# Patient Record
Sex: FEMALE | Race: BLACK OR AFRICAN AMERICAN | NOT HISPANIC OR LATINO | Employment: OTHER | URBAN - METROPOLITAN AREA
[De-identification: names, ages, dates, MRNs, and addresses within clinical notes are randomized per-mention and may not be internally consistent; named-entity substitution may affect disease eponyms.]

---

## 2023-04-05 ENCOUNTER — PATIENT OUTREACH (OUTPATIENT)
Dept: CARE COORDINATION | Facility: CLINIC | Age: 63
End: 2023-04-05
Payer: COMMERCIAL

## 2023-04-05 DIAGNOSIS — K46.0 INCARCERATED HERNIA: ICD-10-CM

## 2023-04-05 RX ORDER — OXYCODONE HYDROCHLORIDE AND ACETAMINOPHEN 325; 5 MG/5ML; MG/5ML
5 SOLUTION ORAL EVERY 8 HOURS PRN
COMMUNITY
End: 2023-04-06 | Stop reason: SDUPTHER

## 2023-04-05 NOTE — PROGRESS NOTES
Discharge Facility:[facility]  Discharge Diagnosis:incarcerated hernia  Admission Date:03/31/23  Discharge Date:04/04/23    PCP appt:04/06/23  Engagement  Call Start Time: 0845 (4/5/2023  9:03 AM)    Medications  Medications reviewed with patient/caregiver?: Yes (4/5/2023  9:03 AM)  Is the patient having any side effects they believe may be caused by any medication additions or changes?: No (4/5/2023  9:03 AM)  Does the patient have all medications ordered at discharge?: Yes (4/5/2023  9:03 AM)  Is the patient taking all medications as directed (includes completed medication regime)?: Yes (4/5/2023  9:03 AM)  Care Management Interventions: Provided patient education (4/5/2023  9:03 AM)    Appointments  Does the patient have a primary care provider?: Yes (4/5/2023  9:03 AM)  Care Management Interventions: Advised patient to make appointment (4/5/2023  8:46 AM)  Care Management Interventions: Advised patient to keep appointment (4/5/2023  9:03 AM)    Self Management  Has home health visited the patient within 72 hours of discharge?: No (4/5/2023  9:03 AM)  Has all Durable Medical Equipment (DME) been delivered?: No (4/5/2023  9:03 AM)    Patient Teaching  Does the patient have access to their discharge instructions?: Yes (4/5/2023  9:03 AM)  Care Management Interventions: Reviewed instructions with patient (4/5/2023  9:03 AM)  What is the patient's perception of their health status since discharge?: Improving (4/5/2023  9:03 AM)  Is the patient/caregiver able to teach back the hierarchy of who to call/visit for symptoms/problems? PCP, Specialist, Home Health nurse, Urgent Care, ED, 911: Yes (4/5/2023  9:03 AM)    Wrap Up  Call End Time: 0850 (4/5/2023  9:03 AM)

## 2023-04-06 ENCOUNTER — OFFICE VISIT (OUTPATIENT)
Dept: PRIMARY CARE | Facility: CLINIC | Age: 63
End: 2023-04-06
Payer: COMMERCIAL

## 2023-04-06 ENCOUNTER — TELEPHONE (OUTPATIENT)
Dept: PRIMARY CARE | Facility: CLINIC | Age: 63
End: 2023-04-06

## 2023-04-06 VITALS
RESPIRATION RATE: 17 BRPM | WEIGHT: 170.6 LBS | TEMPERATURE: 97.1 F | HEIGHT: 61 IN | DIASTOLIC BLOOD PRESSURE: 73 MMHG | SYSTOLIC BLOOD PRESSURE: 126 MMHG | OXYGEN SATURATION: 97 % | BODY MASS INDEX: 32.21 KG/M2 | HEART RATE: 75 BPM

## 2023-04-06 DIAGNOSIS — R30.0 DYSURIA: ICD-10-CM

## 2023-04-06 DIAGNOSIS — K42.0 INCARCERATED UMBILICAL HERNIA: ICD-10-CM

## 2023-04-06 DIAGNOSIS — Z09 HOSPITAL DISCHARGE FOLLOW-UP: Primary | ICD-10-CM

## 2023-04-06 PROBLEM — M71.22 BAKER'S CYST, LEFT: Status: RESOLVED | Noted: 2023-04-06 | Resolved: 2023-04-06

## 2023-04-06 PROBLEM — N18.30 STAGE 3 CHRONIC KIDNEY DISEASE (MULTI): Status: ACTIVE | Noted: 2022-11-21

## 2023-04-06 PROBLEM — H52.10 MYOPIA WITH ASTIGMATISM AND PRESBYOPIA: Status: RESOLVED | Noted: 2023-04-06 | Resolved: 2023-04-06

## 2023-04-06 PROBLEM — L52 ERYTHEMA NODOSUM: Status: ACTIVE | Noted: 2023-04-06

## 2023-04-06 PROBLEM — D68.61 ANTIPHOSPHOLIPID ANTIBODY SYNDROME (MULTI): Status: ACTIVE | Noted: 2023-04-06

## 2023-04-06 PROBLEM — G25.81 RESTLESS LEGS SYNDROME: Status: ACTIVE | Noted: 2023-04-06

## 2023-04-06 PROBLEM — L21.9 SEBORRHEIC DERMATITIS OF SCALP: Status: RESOLVED | Noted: 2023-04-06 | Resolved: 2023-04-06

## 2023-04-06 PROBLEM — G56.21 ULNAR NEUROPATHY AT ELBOW OF RIGHT UPPER EXTREMITY: Status: RESOLVED | Noted: 2023-04-06 | Resolved: 2023-04-06

## 2023-04-06 PROBLEM — I87.009 POST-THROMBOTIC SYNDROME: Status: ACTIVE | Noted: 2023-04-06

## 2023-04-06 PROBLEM — G57.01 PIRIFORMIS SYNDROME OF RIGHT SIDE: Status: RESOLVED | Noted: 2023-04-06 | Resolved: 2023-04-06

## 2023-04-06 PROBLEM — G31.84 AMNESTIC MCI (MILD COGNITIVE IMPAIRMENT WITH MEMORY LOSS): Status: ACTIVE | Noted: 2023-04-06

## 2023-04-06 PROBLEM — M25.562 BILATERAL KNEE PAIN: Status: ACTIVE | Noted: 2023-04-06

## 2023-04-06 PROBLEM — M18.12 PRIMARY OSTEOARTHRITIS OF FIRST CARPOMETACARPAL JOINT OF LEFT HAND: Status: RESOLVED | Noted: 2023-04-06 | Resolved: 2023-04-06

## 2023-04-06 PROBLEM — E66.9 OBESITY: Status: ACTIVE | Noted: 2023-04-06

## 2023-04-06 PROBLEM — D31.02 NEVUS OF LEFT CONJUNCTIVA: Status: RESOLVED | Noted: 2023-04-06 | Resolved: 2023-04-06

## 2023-04-06 PROBLEM — M25.561 BILATERAL KNEE PAIN: Status: ACTIVE | Noted: 2023-04-06

## 2023-04-06 PROBLEM — M43.10 SPONDYLOLISTHESIS, ACQUIRED: Status: RESOLVED | Noted: 2023-04-06 | Resolved: 2023-04-06

## 2023-04-06 PROBLEM — N30.80 CYSTITIS CYSTICA: Status: ACTIVE | Noted: 2023-04-06

## 2023-04-06 PROBLEM — M54.16 LUMBAR RADICULOPATHY: Status: ACTIVE | Noted: 2023-04-06

## 2023-04-06 PROBLEM — I87.2 PERIPHERAL VENOUS INSUFFICIENCY: Status: ACTIVE | Noted: 2021-04-07

## 2023-04-06 PROBLEM — J30.2 SEASONAL ALLERGIC RHINITIS: Status: ACTIVE | Noted: 2023-04-06

## 2023-04-06 PROBLEM — G43.009 MIGRAINE WITHOUT AURA AND WITHOUT STATUS MIGRAINOSUS, NOT INTRACTABLE: Status: ACTIVE | Noted: 2023-04-06

## 2023-04-06 PROBLEM — M19.90 INFLAMMATORY ARTHRITIS: Status: ACTIVE | Noted: 2023-04-06

## 2023-04-06 PROBLEM — Z86.718 HISTORY OF DVT (DEEP VEIN THROMBOSIS): Status: ACTIVE | Noted: 2023-04-06

## 2023-04-06 PROBLEM — E11.9 TYPE 2 DIABETES MELLITUS (MULTI): Status: RESOLVED | Noted: 2022-11-21 | Resolved: 2023-04-06

## 2023-04-06 PROBLEM — H18.519 FUCHS' ENDOTHELIAL DYSTROPHY: Status: ACTIVE | Noted: 2023-04-06

## 2023-04-06 PROBLEM — F43.23 ADJUSTMENT DISORDER WITH MIXED ANXIETY AND DEPRESSED MOOD: Status: ACTIVE | Noted: 2023-04-06

## 2023-04-06 PROBLEM — I73.00 RAYNAUD'S PHENOMENON WITHOUT GANGRENE: Status: ACTIVE | Noted: 2023-04-06

## 2023-04-06 PROBLEM — K11.7 XEROSTOMIA: Status: ACTIVE | Noted: 2023-04-06

## 2023-04-06 PROBLEM — I50.9 CHF (CONGESTIVE HEART FAILURE) (MULTI): Status: ACTIVE | Noted: 2023-04-06

## 2023-04-06 PROBLEM — R94.2 DIFFUSION CAPACITY OF LUNG (DL), DECREASED: Status: ACTIVE | Noted: 2023-04-06

## 2023-04-06 PROBLEM — I88.9 CERVICAL LYMPHADENITIS: Status: RESOLVED | Noted: 2023-04-06 | Resolved: 2023-04-06

## 2023-04-06 PROBLEM — H25.13 CATARACT, NUCLEAR SCLEROTIC, BOTH EYES: Status: RESOLVED | Noted: 2023-04-06 | Resolved: 2023-04-06

## 2023-04-06 PROBLEM — L85.3 DRY SKIN DERMATITIS: Status: ACTIVE | Noted: 2023-04-06

## 2023-04-06 PROBLEM — M15.9 OSTEOARTHRITIS, MULTIPLE SITES: Status: ACTIVE | Noted: 2023-04-06

## 2023-04-06 PROBLEM — G56.00 CARPAL TUNNEL SYNDROME: Status: RESOLVED | Noted: 2023-04-06 | Resolved: 2023-04-06

## 2023-04-06 PROBLEM — K86.2 PANCREAS CYST (HHS-HCC): Status: ACTIVE | Noted: 2023-04-06

## 2023-04-06 PROBLEM — M47.817 LUMBOSACRAL SPONDYLOSIS: Status: RESOLVED | Noted: 2023-04-06 | Resolved: 2023-04-06

## 2023-04-06 PROBLEM — G89.29 CHRONIC PAIN OF LEFT ANKLE: Status: RESOLVED | Noted: 2023-04-06 | Resolved: 2023-04-06

## 2023-04-06 PROBLEM — G80.9 CEREBRAL PALSY (MULTI): Status: ACTIVE | Noted: 2022-11-21

## 2023-04-06 PROBLEM — J45.909 ASTHMA (HHS-HCC): Status: ACTIVE | Noted: 2023-04-06

## 2023-04-06 PROBLEM — M54.50 CHRONIC MIDLINE LOW BACK PAIN WITHOUT SCIATICA: Status: ACTIVE | Noted: 2023-04-06

## 2023-04-06 PROBLEM — E78.5 HLD (HYPERLIPIDEMIA): Status: ACTIVE | Noted: 2023-04-06

## 2023-04-06 PROBLEM — N32.81 OVERACTIVE BLADDER: Status: ACTIVE | Noted: 2023-04-06

## 2023-04-06 PROBLEM — N18.30 CHRONIC KIDNEY DISEASE (CKD), STAGE III (MODERATE) (MULTI): Status: ACTIVE | Noted: 2023-04-06

## 2023-04-06 PROBLEM — H52.4 MYOPIA WITH ASTIGMATISM AND PRESBYOPIA: Status: RESOLVED | Noted: 2023-04-06 | Resolved: 2023-04-06

## 2023-04-06 PROBLEM — R73.03 PREDIABETES: Status: ACTIVE | Noted: 2023-04-06

## 2023-04-06 PROBLEM — I10 HYPERTENSION: Status: ACTIVE | Noted: 2023-04-06

## 2023-04-06 PROBLEM — D86.9 SARCOIDOSIS: Status: ACTIVE | Noted: 2023-04-06

## 2023-04-06 PROBLEM — M25.572 CHRONIC PAIN OF LEFT ANKLE: Status: RESOLVED | Noted: 2023-04-06 | Resolved: 2023-04-06

## 2023-04-06 PROBLEM — R31.29 MICROSCOPIC HEMATURIA: Status: ACTIVE | Noted: 2023-04-06

## 2023-04-06 PROBLEM — N95.1 HOT FLASHES, MENOPAUSAL: Status: RESOLVED | Noted: 2023-04-06 | Resolved: 2023-04-06

## 2023-04-06 PROBLEM — H52.209 MYOPIA WITH ASTIGMATISM AND PRESBYOPIA: Status: RESOLVED | Noted: 2023-04-06 | Resolved: 2023-04-06

## 2023-04-06 PROBLEM — E55.9 VITAMIN D DEFICIENCY: Status: ACTIVE | Noted: 2023-04-06

## 2023-04-06 PROBLEM — M77.11 LATERAL EPICONDYLITIS OF RIGHT ELBOW: Status: RESOLVED | Noted: 2023-04-06 | Resolved: 2023-04-06

## 2023-04-06 PROBLEM — G89.29 CHRONIC MIDLINE LOW BACK PAIN WITHOUT SCIATICA: Status: ACTIVE | Noted: 2023-04-06

## 2023-04-06 PROBLEM — G62.9 SMALL FIBER NEUROPATHY: Status: ACTIVE | Noted: 2023-04-06

## 2023-04-06 PROBLEM — D12.6 TUBULAR ADENOMA OF COLON: Status: ACTIVE | Noted: 2023-04-06

## 2023-04-06 PROBLEM — G47.30 SLEEP APNEA: Status: ACTIVE | Noted: 2023-04-06

## 2023-04-06 LAB
POC APPEARANCE, URINE: CLEAR
POC BILIRUBIN, URINE: NEGATIVE
POC BLOOD, URINE: NEGATIVE
POC COLOR, URINE: YELLOW
POC GLUCOSE, URINE: NEGATIVE MG/DL
POC KETONES, URINE: NEGATIVE MG/DL
POC LEUKOCYTES, URINE: ABNORMAL
POC NITRITE,URINE: NEGATIVE
POC PH, URINE: 5 PH
POC PROTEIN, URINE: NEGATIVE MG/DL
POC SPECIFIC GRAVITY, URINE: 1.01
POC UROBILINOGEN, URINE: 0.2 EU/DL

## 2023-04-06 PROCEDURE — 99213 OFFICE O/P EST LOW 20 MIN: CPT | Performed by: FAMILY MEDICINE

## 2023-04-06 PROCEDURE — 81003 URINALYSIS AUTO W/O SCOPE: CPT | Performed by: FAMILY MEDICINE

## 2023-04-06 PROCEDURE — 3074F SYST BP LT 130 MM HG: CPT | Performed by: FAMILY MEDICINE

## 2023-04-06 PROCEDURE — 3078F DIAST BP <80 MM HG: CPT | Performed by: FAMILY MEDICINE

## 2023-04-06 PROCEDURE — 87086 URINE CULTURE/COLONY COUNT: CPT

## 2023-04-06 RX ORDER — WARFARIN SODIUM 5 MG/1
TABLET ORAL
COMMUNITY
Start: 2020-08-21 | End: 2024-06-04 | Stop reason: WASHOUT

## 2023-04-06 RX ORDER — ALBUTEROL SULFATE 90 UG/1
2 AEROSOL, METERED RESPIRATORY (INHALATION) EVERY 6 HOURS PRN
COMMUNITY
End: 2023-08-31 | Stop reason: SDUPTHER

## 2023-04-06 RX ORDER — FAMOTIDINE 20 MG/1
20 TABLET, FILM COATED ORAL 2 TIMES DAILY
COMMUNITY
End: 2024-04-04 | Stop reason: SDUPTHER

## 2023-04-06 RX ORDER — CYCLOBENZAPRINE HCL 5 MG
1 TABLET ORAL NIGHTLY PRN
COMMUNITY
Start: 2013-03-12 | End: 2023-05-11 | Stop reason: SDUPTHER

## 2023-04-06 RX ORDER — SILDENAFIL CITRATE 20 MG/1
1 TABLET ORAL 2 TIMES DAILY
COMMUNITY
Start: 2022-04-19

## 2023-04-06 RX ORDER — PANTOPRAZOLE SODIUM 40 MG/1
40 TABLET, DELAYED RELEASE ORAL DAILY
COMMUNITY

## 2023-04-06 RX ORDER — CARBIDOPA AND LEVODOPA 25; 100 MG/1; MG/1
1.5 TABLET ORAL DAILY
COMMUNITY
Start: 2022-02-17 | End: 2023-10-06

## 2023-04-06 RX ORDER — VALACYCLOVIR HYDROCHLORIDE 500 MG/1
500 TABLET, FILM COATED ORAL 2 TIMES DAILY
COMMUNITY
End: 2023-06-28 | Stop reason: SDUPTHER

## 2023-04-06 RX ORDER — TRAMADOL HYDROCHLORIDE 50 MG/1
1 TABLET ORAL 2 TIMES DAILY
COMMUNITY
Start: 2023-03-21 | End: 2024-05-16 | Stop reason: ALTCHOICE

## 2023-04-06 RX ORDER — AMLODIPINE BESYLATE 10 MG/1
10 TABLET ORAL DAILY
COMMUNITY
End: 2023-06-28 | Stop reason: SDUPTHER

## 2023-04-06 RX ORDER — ALBUTEROL SULFATE 0.83 MG/ML
2.5 SOLUTION RESPIRATORY (INHALATION) EVERY 6 HOURS PRN
COMMUNITY

## 2023-04-06 RX ORDER — COLLOIDAL OATMEAL 1 %
CREAM (GRAM) TOPICAL 4 TIMES DAILY
COMMUNITY
Start: 2022-02-02 | End: 2024-06-04 | Stop reason: WASHOUT

## 2023-04-06 RX ORDER — NALOXONE HYDROCHLORIDE 4 MG/.1ML
4 SPRAY NASAL AS NEEDED
COMMUNITY
End: 2023-06-28 | Stop reason: ALTCHOICE

## 2023-04-06 RX ORDER — OXYCODONE AND ACETAMINOPHEN 5; 325 MG/1; MG/1
TABLET ORAL
COMMUNITY
Start: 2023-04-04 | End: 2023-06-28 | Stop reason: ALTCHOICE

## 2023-04-06 RX ORDER — FUROSEMIDE 20 MG/1
TABLET ORAL
COMMUNITY
End: 2023-05-11 | Stop reason: SDUPTHER

## 2023-04-06 RX ORDER — ERGOCALCIFEROL 1.25 MG/1
50000 CAPSULE ORAL
COMMUNITY
End: 2023-08-06 | Stop reason: DRUGHIGH

## 2023-04-06 RX ORDER — FLUTICASONE PROPIONATE 50 MCG
SPRAY, SUSPENSION (ML) NASAL
COMMUNITY

## 2023-04-06 RX ORDER — ATORVASTATIN CALCIUM 40 MG/1
40 TABLET, FILM COATED ORAL DAILY
COMMUNITY
End: 2023-06-28 | Stop reason: DRUGHIGH

## 2023-04-06 RX ORDER — POLYETHYLENE GLYCOL 3350 17 G/17G
17 POWDER, FOR SOLUTION ORAL DAILY PRN
COMMUNITY
Start: 2022-05-10 | End: 2024-02-01 | Stop reason: ALTCHOICE

## 2023-04-06 RX ORDER — NITROGLYCERIN 0.4 MG/1
0.4 TABLET SUBLINGUAL EVERY 5 MIN PRN
COMMUNITY
End: 2023-12-26 | Stop reason: SDUPTHER

## 2023-04-06 RX ORDER — TOPIRAMATE 25 MG/1
100 TABLET ORAL NIGHTLY
COMMUNITY
End: 2023-12-01

## 2023-04-06 ASSESSMENT — ENCOUNTER SYMPTOMS
CHILLS: 0
DIARRHEA: 0
FATIGUE: 0
NAUSEA: 0
DIAPHORESIS: 0
VOMITING: 0
DEPRESSION: 0
HEMATURIA: 0
LOSS OF SENSATION IN FEET: 1
FEVER: 0
DIZZINESS: 0
APPETITE CHANGE: 0
COUGH: 0
LIGHT-HEADEDNESS: 0
WHEEZING: 0
UNEXPECTED WEIGHT CHANGE: 0
OCCASIONAL FEELINGS OF UNSTEADINESS: 1
FREQUENCY: 1
DYSURIA: 1

## 2023-04-06 ASSESSMENT — PATIENT HEALTH QUESTIONNAIRE - PHQ9
2. FEELING DOWN, DEPRESSED OR HOPELESS: NOT AT ALL
1. LITTLE INTEREST OR PLEASURE IN DOING THINGS: NOT AT ALL
SUM OF ALL RESPONSES TO PHQ9 QUESTIONS 1 AND 2: 0

## 2023-04-06 ASSESSMENT — COLUMBIA-SUICIDE SEVERITY RATING SCALE - C-SSRS
1. IN THE PAST MONTH, HAVE YOU WISHED YOU WERE DEAD OR WISHED YOU COULD GO TO SLEEP AND NOT WAKE UP?: NO
2. HAVE YOU ACTUALLY HAD ANY THOUGHTS OF KILLING YOURSELF?: NO
6. HAVE YOU EVER DONE ANYTHING, STARTED TO DO ANYTHING, OR PREPARED TO DO ANYTHING TO END YOUR LIFE?: NO

## 2023-04-06 NOTE — ASSESSMENT & PLAN NOTE
Had surgery for incarcerated umbilical hernia on 4/1/23. Home with daughter. Has follow up with surgeon next week. Feeling well except burning urethra. UA ordered.

## 2023-04-06 NOTE — TELEPHONE ENCOUNTER
Patient called in and stated that she was seen today and was told to follow up with her Surgeon. The patient was told to follow up with you on an antibiotic after results from her urine culture.

## 2023-04-07 ENCOUNTER — PATIENT OUTREACH (OUTPATIENT)
Dept: CARE COORDINATION | Facility: CLINIC | Age: 63
End: 2023-04-07
Payer: COMMERCIAL

## 2023-04-07 LAB — URINE CULTURE: NORMAL

## 2023-04-07 NOTE — PROGRESS NOTES
Call regarding apt with PCP on 04/07/23after hospitalization  At time of outreach call the patient feels as if their condition has (improved since last visit.  Patient verbalizes understanding of new orders including labs, therapy, HHC, and DME.   Patient verbalizes understanding of medications including changes made during PCP  Patient verbalizes understanding of referrals needed to specialist.  Patient verbalized understanding plan of care   Any further questions or concerns at this time for PCP? No  Does patient appear to have CCM needs and will need referral to nurse after call back? No

## 2023-04-10 ENCOUNTER — TELEPHONE (OUTPATIENT)
Dept: PRIMARY CARE | Facility: CLINIC | Age: 63
End: 2023-04-10
Payer: COMMERCIAL

## 2023-04-11 NOTE — TELEPHONE ENCOUNTER
Called patient and notified her that her urine culture was negative. Patient understood with no questions or concerns.

## 2023-05-09 ENCOUNTER — PATIENT OUTREACH (OUTPATIENT)
Dept: CARE COORDINATION | Facility: CLINIC | Age: 63
End: 2023-05-09
Payer: COMMERCIAL

## 2023-05-09 NOTE — PROGRESS NOTES
Discharge Facility:[facility]  Discharge Diagnosis:Cellulitis facial  Admission Date:05/06/23  Discharge Date: 05/08/23    PCP Appointment Date:05/17/23  Specialist Appointment Date:   Hospital Encounter and Summary: Linked   See discharge assessment below for further details  Engagement  Call Start Time: 1008 (5/9/2023 10:08 AM)    Medications  Medications reviewed with patient/caregiver?: Yes (only new meds) (5/9/2023 10:08 AM)  Is the patient having any side effects they believe may be caused by any medication additions or changes?: No (5/9/2023 10:08 AM)  Does the patient have all medications ordered at discharge?: Yes (5/9/2023 10:08 AM)  Is the patient taking all medications as directed (includes completed medication regime)?: Yes (5/9/2023 10:08 AM)    Appointments  Does the patient have a primary care provider?: Yes (5/9/2023 10:08 AM)    Self Management  Has home health visited the patient within 72 hours of discharge?: Not applicable (5/9/2023 10:08 AM)  Has all Durable Medical Equipment (DME) been delivered?: No (5/9/2023 10:08 AM)    Patient Teaching  Does the patient have access to their discharge instructions?: Yes (5/9/2023 10:08 AM)  Care Management Interventions: Reviewed instructions with patient (5/9/2023 10:08 AM)  What is the patient's perception of their health status since discharge?: Improving (5/9/2023 10:08 AM)  Is the patient/caregiver able to teach back the hierarchy of who to call/visit for symptoms/problems? PCP, Specialist, Home Health nurse, Urgent Care, ED, 911: Yes (5/9/2023 10:08 AM)    Wrap Up  Call End Time: 1012 (5/9/2023 10:08 AM)

## 2023-05-11 ENCOUNTER — TELEPHONE (OUTPATIENT)
Dept: PRIMARY CARE | Facility: CLINIC | Age: 63
End: 2023-05-11
Payer: COMMERCIAL

## 2023-05-11 DIAGNOSIS — I50.9 CONGESTIVE HEART FAILURE, UNSPECIFIED HF CHRONICITY, UNSPECIFIED HEART FAILURE TYPE (MULTI): ICD-10-CM

## 2023-05-11 DIAGNOSIS — G25.81 RESTLESS LEGS SYNDROME: ICD-10-CM

## 2023-05-11 RX ORDER — FUROSEMIDE 20 MG/1
TABLET ORAL
Qty: 30 TABLET | Refills: 0 | Status: SHIPPED | OUTPATIENT
Start: 2023-05-11 | End: 2023-06-28 | Stop reason: SDUPTHER

## 2023-05-11 RX ORDER — CYCLOBENZAPRINE HCL 5 MG
5 TABLET ORAL NIGHTLY PRN
Qty: 30 TABLET | Refills: 0 | Status: SHIPPED | OUTPATIENT
Start: 2023-05-11 | End: 2024-02-01 | Stop reason: ALTCHOICE

## 2023-05-11 NOTE — TELEPHONE ENCOUNTER
Rx Refill Request Telephone Encounter    Name:  Marni Lugo  :  [id number]  Medication Name:  lasix  20mg  Route : oral  Frequency : daily  Quantity : 90    Specific Pharmacy location:  [facility]  Date of last appointment:    Date of next appointment:  May 17  Best number to reach patient:  [phone]      Rx Refill Request Telephone Encounter    Name:  Marni Lugo  :  [id number]  Medication Name:  cyclobenzaprine  5mg  oral  daily at bedtime  30    Specific Pharmacy location:    Date of last appointment:    Date of next appointment:    Best number to reach patient:

## 2023-05-12 ENCOUNTER — PATIENT OUTREACH (OUTPATIENT)
Dept: CARE COORDINATION | Facility: CLINIC | Age: 63
End: 2023-05-12
Payer: COMMERCIAL

## 2023-05-17 ENCOUNTER — OFFICE VISIT (OUTPATIENT)
Dept: PRIMARY CARE | Facility: CLINIC | Age: 63
End: 2023-05-17
Payer: COMMERCIAL

## 2023-05-17 VITALS
TEMPERATURE: 97.3 F | DIASTOLIC BLOOD PRESSURE: 72 MMHG | HEIGHT: 61 IN | SYSTOLIC BLOOD PRESSURE: 116 MMHG | WEIGHT: 184.2 LBS | RESPIRATION RATE: 16 BRPM | OXYGEN SATURATION: 97 % | BODY MASS INDEX: 34.78 KG/M2 | HEART RATE: 70 BPM

## 2023-05-17 DIAGNOSIS — D86.9 SARCOIDOSIS: ICD-10-CM

## 2023-05-17 DIAGNOSIS — L03.211 FACIAL CELLULITIS: Primary | ICD-10-CM

## 2023-05-17 DIAGNOSIS — Z09 HOSPITAL DISCHARGE FOLLOW-UP: ICD-10-CM

## 2023-05-17 DIAGNOSIS — K42.0 INCARCERATED UMBILICAL HERNIA: ICD-10-CM

## 2023-05-17 PROBLEM — F43.23 ADJUSTMENT DISORDER WITH MIXED ANXIETY AND DEPRESSED MOOD: Status: RESOLVED | Noted: 2023-04-06 | Resolved: 2023-05-17

## 2023-05-17 PROCEDURE — 3078F DIAST BP <80 MM HG: CPT | Performed by: FAMILY MEDICINE

## 2023-05-17 PROCEDURE — 3074F SYST BP LT 130 MM HG: CPT | Performed by: FAMILY MEDICINE

## 2023-05-17 PROCEDURE — 99214 OFFICE O/P EST MOD 30 MIN: CPT | Performed by: FAMILY MEDICINE

## 2023-05-17 RX ORDER — AMOXICILLIN 500 MG/1
500 TABLET, FILM COATED ORAL 3 TIMES DAILY
COMMUNITY
Start: 2023-05-08 | End: 2023-06-28 | Stop reason: ALTCHOICE

## 2023-05-17 ASSESSMENT — ENCOUNTER SYMPTOMS
OCCASIONAL FEELINGS OF UNSTEADINESS: 1
NUMBNESS: 0
LIGHT-HEADEDNESS: 0
SPEECH DIFFICULTY: 0
APPETITE CHANGE: 0
FEVER: 0
HEADACHES: 0
FATIGUE: 0
DEPRESSION: 0
TROUBLE SWALLOWING: 0
LOSS OF SENSATION IN FEET: 0
FACIAL ASYMMETRY: 0

## 2023-05-17 ASSESSMENT — COLUMBIA-SUICIDE SEVERITY RATING SCALE - C-SSRS
1. IN THE PAST MONTH, HAVE YOU WISHED YOU WERE DEAD OR WISHED YOU COULD GO TO SLEEP AND NOT WAKE UP?: NO
6. HAVE YOU EVER DONE ANYTHING, STARTED TO DO ANYTHING, OR PREPARED TO DO ANYTHING TO END YOUR LIFE?: NO
2. HAVE YOU ACTUALLY HAD ANY THOUGHTS OF KILLING YOURSELF?: NO

## 2023-05-17 ASSESSMENT — VISUAL ACUITY
OS_CC: 20/20
OD_CC: 20/20

## 2023-05-17 ASSESSMENT — PATIENT HEALTH QUESTIONNAIRE - PHQ9
SUM OF ALL RESPONSES TO PHQ9 QUESTIONS 1 AND 2: 0
1. LITTLE INTEREST OR PLEASURE IN DOING THINGS: NOT AT ALL
2. FEELING DOWN, DEPRESSED OR HOPELESS: NOT AT ALL

## 2023-05-17 NOTE — PATIENT INSTRUCTIONS
Changed chronic conditaion follow up to one month from now. Have labs done prior.     If you get any recurrence of xymptoms that sent you to hospital, go to ER>     Finish tests ordered by Dr. Jimenez.

## 2023-05-17 NOTE — ASSESSMENT & PLAN NOTE
PFTs and CXR ordered by pulmonary. She will go ahead and schedule. Will follow up with me in 4-6 weeks and will address as her pulmonologist is leaving town. Reviewed Dr. Jimenez's note and orders.

## 2023-05-17 NOTE — ASSESSMENT & PLAN NOTE
Hospitalized with facila cellulits from [facility] A strep infection. 5/6 to 5/8. Was sent home with Augmentin and will finish today. No side effects. She is feeling well now. No sore throat or facial pain.

## 2023-05-17 NOTE — ASSESSMENT & PLAN NOTE
Surgery Dr. Tawanda Prajapati 5/2/23. She is doing well and has no issues with wound or abdominal pain.

## 2023-05-17 NOTE — PROGRESS NOTES
Subjective   Patient ID: Marni Lugo is a 62 y.o. female who presents for Follow-up (FOLLOW UP FROM HOSPITAL VISIT).    Hospitalized 5/6-5/8 due to cellulitis face. Blood cutlures were negative. She was positive for Grp A atrep. On Augmentin through 5/15/23. Had ENT referral and imaging. Right ear was swollen and painful. She is doing much better. No pain left. Has 2 antibiotic pills left. She is tolerating them fine.     Saw GI Gi 4/25/2023 about pancreas cyst and has MRI pancreas ordered. Has not scheduled it yet.     Was sent for sleep study. They sent her to Doctor in [place] who is leaving. He ordered lot of tests. She never got her sleep study because hospitalized.  That was the pulmonary doctor and saw him for sarcoid. She has rescheduled sleep study. He ordered PFTs and chest xray.     Eye surgery was postponed due to illness. Is following up.              Current Outpatient Medications:     albuterol 2.5 mg /3 mL (0.083 %) nebulizer solution, Take 3 mL (2.5 mg) by nebulization every 6 hours if needed., Disp: , Rfl:     albuterol 90 mcg/actuation inhaler, Inhale 2 puffs every 6 hours if needed., Disp: , Rfl:     amLODIPine (Norvasc) 10 mg tablet, Take 1 tablet (10 mg) by mouth once daily., Disp: , Rfl:     amoxicillin (Amoxil) 500 mg tablet, Take 1 tablet (500 mg) by mouth 3 times a day., Disp: , Rfl:     atorvastatin (Lipitor) 40 mg tablet, Take 1 tablet (40 mg) by mouth once daily., Disp: , Rfl:     carbidopa-levodopa (Sinemet)  mg tablet, Take 1.5 tablets by mouth once daily., Disp: , Rfl:     colloidal oatmeaL (Eucerin Eczema Relief) 1 % cream, 4 times a day., Disp: , Rfl:     cyclobenzaprine (Flexeril) 5 mg tablet, Take 1 tablet (5 mg) by mouth as needed at bedtime for muscle spasms., Disp: 30 tablet, Rfl: 0    ergocalciferol (Vitamin D-2) 1.25 MG (77353 UT) capsule, Take 1 capsule (50,000 Units) by mouth 1 (one) time per week., Disp: , Rfl:     famotidine (Pepcid) 20 mg tablet, Take 1 tablet  (20 mg) by mouth 2 times a day., Disp: , Rfl:     fluticasone (Flonase) 50 mcg/actuation nasal spray, SHAKE LIQUID AND USE 1 TO 2 SPRAYS IN EACH NOSTRIL DAILY AS NEEDED FOR SYMPTOMS OR ALLERGY, Disp: , Rfl:     furosemide (Lasix) 20 mg tablet, TAKE 1 TABLET BY MOUTH EVERY DAY AS NEEDED FOR LEG SWELLING, Disp: 30 tablet, Rfl: 0    Jantoven 5 mg tablet, Take by mouth., Disp: , Rfl:     naloxone (Narcan) 4 mg/0.1 mL nasal spray, Administer 1 spray (4 mg) into affected nostril(s) if needed for respiratory depression., Disp: , Rfl:     nitroglycerin (Nitrostat) 0.4 mg SL tablet, Place 1 tablet (0.4 mg) under the tongue every 5 minutes if needed., Disp: , Rfl:     oxyCODONE-acetaminophen (Percocet) 5-325 mg tablet, , Disp: , Rfl:     pantoprazole (ProtoNix) 40 mg EC tablet, Take 1 tablet (40 mg) by mouth once daily., Disp: , Rfl:     polyethylene glycol (Glycolax) 17 gram packet, Take 17 g by mouth once daily as needed (constipation)., Disp: , Rfl:     sildenafil (Revatio) 20 mg tablet, Take 1 tablet (20 mg) by mouth 2 times a day., Disp: , Rfl:     sodium chloride (Ayr) 0.65 % nasal drops, Administer 1 drop into each nostril 4 times a day as needed., Disp: , Rfl:     topiramate (Topamax) 25 mg tablet, Take 4 tablets (100 mg) by mouth once daily at bedtime., Disp: , Rfl:     traMADol (Ultram) 50 mg tablet, Take 1 tablet (50 mg) by mouth 2 times a day., Disp: , Rfl:     valACYclovir (Valtrex) 500 mg tablet, Take 1 tablet (500 mg) by mouth 2 times a day., Disp: , Rfl:     Patient Active Problem List   Diagnosis    Amnestic MCI (mild cognitive impairment with memory loss)    Antiphospholipid antibody syndrome (CMS/HCC)    Asthma    Sleep apnea    Bilateral knee pain    CHF (congestive heart failure) (CMS/HCC)    Stage 3 chronic kidney disease (CMS/HCC)    Chronic midline low back pain without sciatica    Cystitis cystica    Diffusion capacity of lung (dl), decreased    Dry skin dermatitis    Erythema nodosum    Barryhs'  "endothelial dystrophy    History of DVT (deep vein thrombosis)    HLD (hyperlipidemia)    Hypertension    Inflammatory arthritis    Lumbar radiculopathy    Microscopic hematuria    Migraine without aura and without status migrainosus, not intractable    Obesity    Osteoarthritis, multiple sites    Overactive bladder    Pancreas cyst    Post-thrombotic syndrome    Prediabetes    Raynaud's phenomenon without gangrene    Restless legs syndrome    Sarcoidosis    Seasonal allergic rhinitis    Small fiber neuropathy    Tubular adenoma of colon    Vitamin D deficiency    Xerostomia    Incarcerated umbilical hernia    Peripheral venous insufficiency    Facial cellulitis         Review of Systems   Constitutional:  Negative for appetite change, fatigue and fever.   HENT:  Negative for trouble swallowing.    Neurological:  Negative for facial asymmetry, speech difficulty, light-headedness, numbness and headaches.       Objective   /72 (BP Location: Left arm, Patient Position: Sitting, BP Cuff Size: Large adult)   Pulse 70   Temp 36.3 °C (97.3 °F)   Resp 16   Ht 1.549 m (5' 1\")   Wt 83.6 kg (184 lb 3.2 oz)   SpO2 97%   BMI 34.80 kg/m²     Physical Exam  Vitals reviewed.   Constitutional:       Appearance: Normal appearance.   Pulmonary:      Effort: Pulmonary effort is normal.   Musculoskeletal:      Cervical back: Normal range of motion and neck supple.   Neurological:      Mental Status: She is alert.   Psychiatric:         Mood and Affect: Mood normal.         Behavior: Behavior normal.         Assessment/Plan   Problem List Items Addressed This Visit          Digestive    Incarcerated umbilical hernia     Surgery Dr. Tawanda Prajapati 5/2/23. She is doing well and has no issues with wound or abdominal pain.             Infectious/Inflammatory    Sarcoidosis     PFTs and CXR ordered by pulmonary. She will go ahead and schedule. Will follow up with me in 4-6 weeks and will address as her pulmonologist is leaving " [place]. Reviewed Dr. Jimenez's note and orders.          Facial cellulitis - Primary     Hospitalized with facila cellulits from [facility] A strep infection. 5/6 to 5/8. Was sent home with Augmentin and will finish today. No side effects. She is feeling well now. No sore throat or facial pain.               Assessment, plans, tests, and follow up discussed with patient and patient verbalized understanding. Marni was given an opportunity to ask questions and  any concerns were addressed including but not limited to follow up, labs and need to finish pulonary workup. .

## 2023-05-19 ENCOUNTER — PATIENT OUTREACH (OUTPATIENT)
Dept: CARE COORDINATION | Facility: CLINIC | Age: 63
End: 2023-05-19
Payer: COMMERCIAL

## 2023-05-19 NOTE — PROGRESS NOTES
Unable to reach patient for call back after patient's follow up appointment with PCP.   FALGUNIM with call back number for patient to call if needed   If no voicemail available call attempts x 2 were made to contact the patient to assist with any questions or concerns patient may have.

## 2023-06-07 LAB
ALANINE AMINOTRANSFERASE (SGPT) (U/L) IN SER/PLAS: 20 U/L (ref 7–45)
ALBUMIN (G/DL) IN SER/PLAS: 4 G/DL (ref 3.4–5)
ALKALINE PHOSPHATASE (U/L) IN SER/PLAS: 133 U/L (ref 33–136)
ANION GAP IN SER/PLAS: 10 MMOL/L (ref 10–20)
ASPARTATE AMINOTRANSFERASE (SGOT) (U/L) IN SER/PLAS: 19 U/L (ref 9–39)
BILIRUBIN TOTAL (MG/DL) IN SER/PLAS: 0.6 MG/DL (ref 0–1.2)
CALCIDIOL (25 OH VITAMIN D3) (NG/ML) IN SER/PLAS: 27 NG/ML
CALCIUM (MG/DL) IN SER/PLAS: 9.2 MG/DL (ref 8.6–10.3)
CARBON DIOXIDE, TOTAL (MMOL/L) IN SER/PLAS: 25 MMOL/L (ref 21–32)
CHLORIDE (MMOL/L) IN SER/PLAS: 112 MMOL/L (ref 98–107)
CHOLESTEROL (MG/DL) IN SER/PLAS: 223 MG/DL (ref 0–199)
CHOLESTEROL IN HDL (MG/DL) IN SER/PLAS: 46.6 MG/DL
CHOLESTEROL/HDL RATIO: 4.8
CREATININE (MG/DL) IN SER/PLAS: 0.91 MG/DL (ref 0.5–1.05)
ESTIMATED AVERAGE GLUCOSE FOR HBA1C: 140 MG/DL
GFR FEMALE: 72 ML/MIN/1.73M2
GLUCOSE (MG/DL) IN SER/PLAS: 127 MG/DL (ref 74–99)
HEMOGLOBIN A1C/HEMOGLOBIN TOTAL IN BLOOD: 6.5 %
LDL: 138 MG/DL (ref 0–99)
POTASSIUM (MMOL/L) IN SER/PLAS: 3.8 MMOL/L (ref 3.5–5.3)
PROTEIN TOTAL: 7.2 G/DL (ref 6.4–8.2)
SODIUM (MMOL/L) IN SER/PLAS: 143 MMOL/L (ref 136–145)
TRIGLYCERIDE (MG/DL) IN SER/PLAS: 194 MG/DL (ref 0–149)
UREA NITROGEN (MG/DL) IN SER/PLAS: 11 MG/DL (ref 6–23)
VLDL: 39 MG/DL (ref 0–40)

## 2023-06-28 ENCOUNTER — OFFICE VISIT (OUTPATIENT)
Dept: PRIMARY CARE | Facility: CLINIC | Age: 63
End: 2023-06-28
Payer: COMMERCIAL

## 2023-06-28 VITALS
BODY MASS INDEX: 35.61 KG/M2 | OXYGEN SATURATION: 94 % | DIASTOLIC BLOOD PRESSURE: 81 MMHG | HEIGHT: 61 IN | SYSTOLIC BLOOD PRESSURE: 120 MMHG | TEMPERATURE: 97 F | HEART RATE: 63 BPM | RESPIRATION RATE: 17 BRPM | WEIGHT: 188.6 LBS

## 2023-06-28 DIAGNOSIS — B00.1 RECURRENT COLD SORES: ICD-10-CM

## 2023-06-28 DIAGNOSIS — E11.9 TYPE 2 DIABETES MELLITUS WITHOUT COMPLICATION, WITHOUT LONG-TERM CURRENT USE OF INSULIN (MULTI): ICD-10-CM

## 2023-06-28 DIAGNOSIS — E66.09 CLASS 1 OBESITY DUE TO EXCESS CALORIES WITH SERIOUS COMORBIDITY AND BODY MASS INDEX (BMI) OF 34.0 TO 34.9 IN ADULT: ICD-10-CM

## 2023-06-28 DIAGNOSIS — E55.9 VITAMIN D DEFICIENCY: ICD-10-CM

## 2023-06-28 DIAGNOSIS — R94.2 DIFFUSION CAPACITY OF LUNG (DL), DECREASED: ICD-10-CM

## 2023-06-28 DIAGNOSIS — I10 HYPERTENSION, UNSPECIFIED TYPE: Primary | ICD-10-CM

## 2023-06-28 DIAGNOSIS — G47.30 SLEEP APNEA, UNSPECIFIED TYPE: ICD-10-CM

## 2023-06-28 DIAGNOSIS — I50.9 CONGESTIVE HEART FAILURE, UNSPECIFIED HF CHRONICITY, UNSPECIFIED HEART FAILURE TYPE (MULTI): ICD-10-CM

## 2023-06-28 DIAGNOSIS — N18.31 STAGE 3A CHRONIC KIDNEY DISEASE (MULTI): ICD-10-CM

## 2023-06-28 DIAGNOSIS — J45.909 MILD ASTHMA, UNSPECIFIED WHETHER COMPLICATED, UNSPECIFIED WHETHER PERSISTENT (HHS-HCC): ICD-10-CM

## 2023-06-28 DIAGNOSIS — R07.89 OTHER CHEST PAIN: ICD-10-CM

## 2023-06-28 DIAGNOSIS — D86.9 SARCOIDOSIS: ICD-10-CM

## 2023-06-28 DIAGNOSIS — E78.2 MIXED HYPERLIPIDEMIA: ICD-10-CM

## 2023-06-28 PROBLEM — L03.211 FACIAL CELLULITIS: Status: RESOLVED | Noted: 2023-05-17 | Resolved: 2023-06-28

## 2023-06-28 PROCEDURE — 99215 OFFICE O/P EST HI 40 MIN: CPT | Performed by: FAMILY MEDICINE

## 2023-06-28 PROCEDURE — 3044F HG A1C LEVEL LT 7.0%: CPT | Performed by: FAMILY MEDICINE

## 2023-06-28 PROCEDURE — 3008F BODY MASS INDEX DOCD: CPT | Performed by: FAMILY MEDICINE

## 2023-06-28 PROCEDURE — 3079F DIAST BP 80-89 MM HG: CPT | Performed by: FAMILY MEDICINE

## 2023-06-28 PROCEDURE — 3074F SYST BP LT 130 MM HG: CPT | Performed by: FAMILY MEDICINE

## 2023-06-28 RX ORDER — ATORVASTATIN CALCIUM 10 MG/1
10 TABLET, FILM COATED ORAL DAILY
COMMUNITY
Start: 2023-05-18 | End: 2023-06-28 | Stop reason: DRUGHIGH

## 2023-06-28 RX ORDER — VALACYCLOVIR HYDROCHLORIDE 500 MG/1
500 TABLET, FILM COATED ORAL DAILY
Qty: 90 TABLET | Refills: 3 | Status: SHIPPED | OUTPATIENT
Start: 2023-06-28 | End: 2024-05-16 | Stop reason: SDUPTHER

## 2023-06-28 RX ORDER — AMLODIPINE BESYLATE 10 MG/1
10 TABLET ORAL DAILY
Qty: 90 TABLET | Refills: 3 | Status: SHIPPED | OUTPATIENT
Start: 2023-06-28 | End: 2024-05-16 | Stop reason: SDUPTHER

## 2023-06-28 RX ORDER — FUROSEMIDE 20 MG/1
TABLET ORAL
Qty: 90 TABLET | Refills: 3 | Status: SHIPPED | OUTPATIENT
Start: 2023-06-28 | End: 2024-05-16 | Stop reason: SDUPTHER

## 2023-06-28 RX ORDER — ATORVASTATIN CALCIUM 40 MG/1
40 TABLET, FILM COATED ORAL DAILY
Qty: 90 TABLET | Refills: 3 | Status: SHIPPED | OUTPATIENT
Start: 2023-06-28 | End: 2024-02-14 | Stop reason: ALTCHOICE

## 2023-06-28 ASSESSMENT — PATIENT HEALTH QUESTIONNAIRE - PHQ9
1. LITTLE INTEREST OR PLEASURE IN DOING THINGS: NOT AT ALL
2. FEELING DOWN, DEPRESSED OR HOPELESS: NOT AT ALL
SUM OF ALL RESPONSES TO PHQ9 QUESTIONS 1 AND 2: 0

## 2023-06-28 ASSESSMENT — COLUMBIA-SUICIDE SEVERITY RATING SCALE - C-SSRS
6. HAVE YOU EVER DONE ANYTHING, STARTED TO DO ANYTHING, OR PREPARED TO DO ANYTHING TO END YOUR LIFE?: NO
1. IN THE PAST MONTH, HAVE YOU WISHED YOU WERE DEAD OR WISHED YOU COULD GO TO SLEEP AND NOT WAKE UP?: NO
2. HAVE YOU ACTUALLY HAD ANY THOUGHTS OF KILLING YOURSELF?: NO

## 2023-06-28 ASSESSMENT — ENCOUNTER SYMPTOMS
OCCASIONAL FEELINGS OF UNSTEADINESS: 0
UNEXPECTED WEIGHT CHANGE: 0
COUGH: 0
SHORTNESS OF BREATH: 1
DEPRESSION: 0
PALPITATIONS: 1
LOSS OF SENSATION IN FEET: 0
CONFUSION: 0
HEADACHES: 0

## 2023-06-28 NOTE — ASSESSMENT & PLAN NOTE
This is 2nd A1C over 6.4 in last year. Does not tolerate metformin. Start Empaglaflozine 10 mg as also helpful with her heart issues.

## 2023-06-28 NOTE — ASSESSMENT & PLAN NOTE
Sleep study done by Dr. Jimenez March 2023, needs to follow up with pulmonology re results. Referred to new pulmonolgist as he is gone.

## 2023-06-28 NOTE — ASSESSMENT & PLAN NOTE
Atorvastatin was lowered to 10 mg for some reason neither the patient or I understand, suspect was issue in changeover to Epic. LDL and TG up. Resume 40 mg and recheck in 3 months.

## 2023-06-28 NOTE — ASSESSMENT & PLAN NOTE
Remotely had stress. Was given NTG prn by someone but never seen cardiology. She has persistent stable chest pain along with palpitations. Refer cardiology.

## 2023-07-20 ENCOUNTER — APPOINTMENT (OUTPATIENT)
Dept: LAB | Facility: LAB | Age: 63
End: 2023-07-20
Payer: COMMERCIAL

## 2023-07-20 LAB
ALANINE AMINOTRANSFERASE (SGPT) (U/L) IN SER/PLAS: 12 U/L (ref 7–45)
ALBUMIN (G/DL) IN SER/PLAS: 4.1 G/DL (ref 3.4–5)
ALBUMIN (MG/L) IN URINE: 8.4 MG/L
ALBUMIN/CREATININE (UG/MG) IN URINE: 10.6 UG/MG CRT (ref 0–30)
ALKALINE PHOSPHATASE (U/L) IN SER/PLAS: 133 U/L (ref 33–136)
ANION GAP IN SER/PLAS: 11 MMOL/L (ref 10–20)
ASPARTATE AMINOTRANSFERASE (SGOT) (U/L) IN SER/PLAS: 18 U/L (ref 9–39)
BILIRUBIN TOTAL (MG/DL) IN SER/PLAS: 0.8 MG/DL (ref 0–1.2)
CALCIUM (MG/DL) IN SER/PLAS: 9.3 MG/DL (ref 8.6–10.3)
CARBON DIOXIDE, TOTAL (MMOL/L) IN SER/PLAS: 25 MMOL/L (ref 21–32)
CHLORIDE (MMOL/L) IN SER/PLAS: 114 MMOL/L (ref 98–107)
CHOLESTEROL (MG/DL) IN SER/PLAS: 242 MG/DL (ref 0–199)
CHOLESTEROL IN HDL (MG/DL) IN SER/PLAS: 47.9 MG/DL
CHOLESTEROL/HDL RATIO: 5.1
CREATININE (MG/DL) IN SER/PLAS: 1.07 MG/DL (ref 0.5–1.05)
CREATININE (MG/DL) IN URINE: 79.1 MG/DL (ref 20–320)
ESTIMATED AVERAGE GLUCOSE FOR HBA1C: 143 MG/DL
GFR FEMALE: 59 ML/MIN/1.73M2
GLUCOSE (MG/DL) IN SER/PLAS: 107 MG/DL (ref 74–99)
HEMOGLOBIN A1C/HEMOGLOBIN TOTAL IN BLOOD: 6.6 %
LDL: 156 MG/DL (ref 0–99)
POTASSIUM (MMOL/L) IN SER/PLAS: 4 MMOL/L (ref 3.5–5.3)
PROTEIN TOTAL: 6.8 G/DL (ref 6.4–8.2)
SODIUM (MMOL/L) IN SER/PLAS: 146 MMOL/L (ref 136–145)
THYROXINE (T4) FREE (NG/DL) IN SER/PLAS: 0.9 NG/DL (ref 0.61–1.12)
TRIGLYCERIDE (MG/DL) IN SER/PLAS: 190 MG/DL (ref 0–149)
UREA NITROGEN (MG/DL) IN SER/PLAS: 15 MG/DL (ref 6–23)
VLDL: 38 MG/DL (ref 0–40)

## 2023-07-22 LAB — VITAMIN D 1,25-DIHYDROXY: 79.4 PG/ML (ref 19.9–79.3)

## 2023-07-25 NOTE — PROGRESS NOTES
Unable to reach patient for 90 day  follow up call.  Graduated patient  LVM with call back number for patient to call if needed   If no voicemail available call attempts x 2 were made to contact the patient to assist with any questions or concerns patient may have.

## 2023-07-27 ENCOUNTER — APPOINTMENT (OUTPATIENT)
Dept: PRIMARY CARE | Facility: CLINIC | Age: 63
End: 2023-07-27
Payer: COMMERCIAL

## 2023-08-06 ENCOUNTER — TELEPHONE (OUTPATIENT)
Dept: PRIMARY CARE | Facility: CLINIC | Age: 63
End: 2023-08-06
Payer: COMMERCIAL

## 2023-08-07 NOTE — TELEPHONE ENCOUNTER
VitaminD level is too high now at 79.4. She is on 41367 units weekly. Stop that and go back down to taking 1000 units daily.   Cholesterol still a little high but improving.

## 2023-08-31 ENCOUNTER — TELEPHONE (OUTPATIENT)
Dept: PRIMARY CARE | Facility: CLINIC | Age: 63
End: 2023-08-31
Payer: COMMERCIAL

## 2023-08-31 DIAGNOSIS — J45.909 MILD ASTHMA, UNSPECIFIED WHETHER COMPLICATED, UNSPECIFIED WHETHER PERSISTENT (HHS-HCC): ICD-10-CM

## 2023-08-31 RX ORDER — ALBUTEROL SULFATE 90 UG/1
2 AEROSOL, METERED RESPIRATORY (INHALATION) EVERY 6 HOURS PRN
Qty: 18 G | Refills: 2 | Status: SHIPPED | OUTPATIENT
Start: 2023-08-31

## 2023-08-31 NOTE — TELEPHONE ENCOUNTER
Rx Refill Request Telephone Encounter    Name:  Marni Lugo  :  [id number]  Medication Name:      albuterol 90 mcg/actuation inhaler            Specific Pharmacy location:   [id number] [place] [id number] AT [facility] [address]         Date of last appointment:  23  Date of next appointment: 23   Best number to reach patient: [phone]

## 2023-09-25 DIAGNOSIS — I26.99 PULMONARY EMBOLISM AND INFARCTION (MULTI): Primary | ICD-10-CM

## 2023-09-25 LAB
INR IN PPP BY COAGULATION ASSAY EXTERNAL: 2.6
PROTHROMBIN TIME (PT) IN PPP BY COAGULATION ASSAY EXTERNAL: NORMAL SECONDS

## 2023-09-27 ENCOUNTER — APPOINTMENT (OUTPATIENT)
Dept: PRIMARY CARE | Facility: CLINIC | Age: 63
End: 2023-09-27
Payer: COMMERCIAL

## 2023-10-02 ENCOUNTER — ANTICOAGULATION - WARFARIN VISIT (OUTPATIENT)
Dept: CARDIOLOGY | Facility: CLINIC | Age: 63
End: 2023-10-02
Payer: COMMERCIAL

## 2023-10-02 DIAGNOSIS — I26.99 PULMONARY EMBOLISM AND INFARCTION (MULTI): Primary | ICD-10-CM

## 2023-10-02 LAB
INR IN PPP BY COAGULATION ASSAY EXTERNAL: 1.2
PROTHROMBIN TIME (PT) IN PPP BY COAGULATION ASSAY EXTERNAL: NORMAL SECONDS

## 2023-10-02 NOTE — PROGRESS NOTES
Patient identification verified with 2 identifiers.    Location: [facility] Patient Self-Testing Program [phone]    Referring Physician: Adamaris Nguyen  Enrollment/ Re-enrollment date: 2024   INR Goal: 2.0-3.0  INR monitoring is per [facility] protocol.  Anticoagulation Medication: warfarin  Indication: PE    Subjective   Bleeding signs/symptoms: No    Bruising: No   Major bleeding event: No  Thrombosis signs/symptoms: No  Thromboembolic event: No  Missed doses: No  Extra doses: No  Medication changes: Yes  pt started doxycycine BID two days ago for ten days.  Dietary changes: No  Change in health: No  Change in activity: No  Alcohol: No  Other concerns: No    Upcoming Surgeries:  Does the Patient Have any upcoming surgeries that require interruption in anticoagulation therapy? no  Does the patient require bridging? no      Anticoagulation Summary  As of 10/2/2023      INR goal:  2.0-3.0   TTR:  --   INR used for dosin.20 (10/2/2023)   Weekly warfarin total:  35 mg               Assessment/Plan   Subtherapeutic     1. New dose:  one time dose of 7.5 mg Mon 10/2 only     2. Next INR:  three days      Education provided to patient during the visit:  Patient instructed to call in interim with questions, concerns and changes.

## 2023-10-02 NOTE — PROGRESS NOTES
Subjective   Patient ID: Marni Lugo is a 62 y.o. female who presents for Hypertension, Diabetes, and Sleep Study.    Here for follow up on chronic conditions, last seen for these 6 months.     Has had multiple admissions - for cellulitis pinna, incarcerated hernia. She has seen GI Dr. Mcdonnell for this as well. Had been on Keflex for drainage from navel and infection. She is done now. She is very upset with her surgeon because she did not feel listened to.. She feels better now.     Chronic systolic heart failure - coumadin appointment, does not see cardiology. Last echo 2020. She did not have heart failure that she is aware of.   DM2 - A1C has gone up to 6.5, no other above 6.4. Diet has not been good due to multiple hospital stays and stress-eating. She is caring for grandson. Metformin bothers stomach and cannot tolerate it.     Former smoker - had LDCT per Dr. Jimenez in March 2023, She had PFTS and sleep study ordered by him. He left and she has not been reschduled for pulmonary. Needs new referral.                Current Outpatient Medications:     albuterol 2.5 mg /3 mL (0.083 %) nebulizer solution, Take 3 mL (2.5 mg) by nebulization every 6 hours if needed., Disp: , Rfl:     albuterol 90 mcg/actuation inhaler, Inhale 2 puffs every 6 hours if needed., Disp: , Rfl:     carbidopa-levodopa (Sinemet)  mg tablet, Take 1.5 tablets by mouth once daily., Disp: , Rfl:     colloidal oatmeaL (Eucerin Eczema Relief) 1 % cream, 4 times a day., Disp: , Rfl:     cyclobenzaprine (Flexeril) 5 mg tablet, Take 1 tablet (5 mg) by mouth as needed at bedtime for muscle spasms., Disp: 30 tablet, Rfl: 0    ergocalciferol (Vitamin D-2) 1.25 MG (70755 UT) capsule, Take 1 capsule (50,000 Units) by mouth 1 (one) time per week., Disp: , Rfl:     famotidine (Pepcid) 20 mg tablet, Take 1 tablet (20 mg) by mouth 2 times a day., Disp: , Rfl:     fluticasone (Flonase) 50 mcg/actuation nasal spray, SHAKE LIQUID AND USE 1 TO 2 SPRAYS IN  pt returning call to OB/GYN Call Center Triage RN     Call Connected to: Call Center triage queue  and routed message to provider's clinical pool.   EACH NOSTRIL DAILY AS NEEDED FOR SYMPTOMS OR ALLERGY, Disp: , Rfl:     Jantoven 5 mg tablet, Take by mouth., Disp: , Rfl:     nitroglycerin (Nitrostat) 0.4 mg SL tablet, Place 1 tablet (0.4 mg) under the tongue every 5 minutes if needed., Disp: , Rfl:     pantoprazole (ProtoNix) 40 mg EC tablet, Take 1 tablet (40 mg) by mouth once daily., Disp: , Rfl:     polyethylene glycol (Glycolax) 17 gram packet, Take 17 g by mouth once daily as needed (constipation)., Disp: , Rfl:     sildenafil (Revatio) 20 mg tablet, Take 1 tablet (20 mg) by mouth 2 times a day., Disp: , Rfl:     sodium chloride (Ayr) 0.65 % nasal drops, Administer 1 drop into each nostril 4 times a day as needed., Disp: , Rfl:     topiramate (Topamax) 25 mg tablet, Take 4 tablets (100 mg) by mouth once daily at bedtime., Disp: , Rfl:     traMADol (Ultram) 50 mg tablet, Take 1 tablet (50 mg) by mouth 2 times a day., Disp: , Rfl:     amLODIPine (Norvasc) 10 mg tablet, Take 1 tablet (10 mg) by mouth once daily., Disp: 90 tablet, Rfl: 3    atorvastatin (Lipitor) 40 mg tablet, Take 1 tablet (40 mg) by mouth once daily., Disp: 90 tablet, Rfl: 3    empagliflozin (Jardiance) 10 mg, Take 1 tablet (10 mg) by mouth once daily., Disp: 90 tablet, Rfl: 1    furosemide (Lasix) 20 mg tablet, TAKE 1 TABLET BY MOUTH EVERY DAY AS NEEDED FOR LEG SWELLING, Disp: 90 tablet, Rfl: 3    valACYclovir (Valtrex) 500 mg tablet, Take 1 tablet (500 mg) by mouth once daily., Disp: 90 tablet, Rfl: 3    Patient Active Problem List   Diagnosis    Amnestic MCI (mild cognitive impairment with memory loss)    Antiphospholipid antibody syndrome (CMS/HCC)    Asthma    Sleep apnea    Bilateral knee pain    CHF (congestive heart failure) (CMS/[facility])    Stage 3 chronic kidney disease (CMS/HCC)    Chronic midline low back pain without sciatica    Cystitis cystica    Diffusion capacity of lung (dl), decreased    Dry skin dermatitis    Erythema nodosum    Fuchs' endothelial dystrophy    History of DVT  "(deep vein thrombosis)    HLD (hyperlipidemia)    Hypertension    Inflammatory arthritis    Lumbar radiculopathy    Microscopic hematuria    Migraine without aura and without status migrainosus, not intractable    Obesity    Osteoarthritis, multiple sites    Overactive bladder    Pancreas cyst    Post-thrombotic syndrome    Type 2 diabetes mellitus without complication, without long-term current use of insulin (CMS/HCC)    Raynaud's phenomenon without gangrene    Restless legs syndrome    Sarcoidosis    Seasonal allergic rhinitis    Small fiber neuropathy    Tubular adenoma of colon    Vitamin D deficiency    Xerostomia    Incarcerated umbilical hernia    Peripheral venous insufficiency    Other chest pain         Review of Systems   Constitutional:  Negative for unexpected weight change.   Respiratory:  Positive for shortness of breath. Negative for cough.         Shortness of breath is at her baseline. Comes with activity going up and down stairs.    Cardiovascular:  Positive for chest pain and palpitations. Negative for leg swelling.   Skin:  Negative for rash.   Neurological:  Negative for headaches.   Psychiatric/Behavioral:  Negative for confusion.        Objective   /81 (BP Location: Right arm, Patient Position: Sitting, BP Cuff Size: Large adult)   Pulse 63   Temp 36.1 °C (97 °F)   Resp 17   Ht 1.549 m (5' 1\")   Wt 85.5 kg (188 lb 9.6 oz)   SpO2 94%   BMI 35.64 kg/m²     Physical Exam  Vitals reviewed.   Constitutional:       General: She is not in acute distress.     Appearance: She is not ill-appearing.   Neck:      Vascular: No carotid bruit.   Cardiovascular:      Rate and Rhythm: Normal rate and regular rhythm.      Pulses: Normal pulses.      Heart sounds: No murmur heard.  Pulmonary:      Effort: Pulmonary effort is normal.      Breath sounds: Normal breath sounds.   Musculoskeletal:      Left lower leg: No edema.   Skin:     Findings: No rash.   Neurological:      Mental Status: She is " alert.   Psychiatric:         Mood and Affect: Mood normal.         Assessment/Plan   Problem List Items Addressed This Visit          Cardiac and Vasculature    CHF (congestive heart failure) (CMS/[facility])     Last echo 3 yrs ago, has no cardiologist. Refer for eval and follow up         Relevant Medications    furosemide (Lasix) 20 mg tablet    amLODIPine (Norvasc) 10 mg tablet    Other Relevant Orders    Referral to Cardiology    HLD (hyperlipidemia)     Atorvastatin was lowered to 10 mg for some reason neither the patient or I understand, suspect was issue in changeover to Epic. LDL and TG up. Resume 40 mg and recheck in 3 months.          Relevant Medications    atorvastatin (Lipitor) 40 mg tablet    Other Relevant Orders    Referral to Cardiology    Hypertension - Primary     BP at goal. Continue amlodipine.          Relevant Medications    amLODIPine (Norvasc) 10 mg tablet    Other Relevant Orders    Referral to Cardiology    Other chest pain     Remotely had stress. Was given NTG prn by someone but never seen cardiology. She has persistent stable chest pain along with palpitations. Refer cardiology.          Relevant Orders    Referral to Cardiology       Endocrine/Metabolic    Obesity    Type 2 diabetes mellitus without complication, without long-term current use of insulin (CMS/[facility])     This is 2nd A1C over 6.4 in last year. Does not tolerate metformin. Start Empaglaflozine 10 mg as also helpful with her heart issues.          Relevant Medications    empagliflozin (Jardiance) 10 mg    Vitamin D deficiency     Level down but has missed doses. Resume and recheck in 3 months.             Genitourinary and Reproductive    Stage 3 chronic kidney disease (CMS/[facility])     GFR normal. Repeat labs in 3 months due to med changes.             Multi-system (Lupus, Sarcoid)    Sarcoidosis     Refer back to Pulmonology         Relevant Orders    Referral to Pulmonology       Pulmonary and Pneumonias    Asthma     Dr. Jimenez did  pFTs before he left. No pulmonary visit scheduled. Referred back         Relevant Orders    Referral to Pulmonology    Diffusion capacity of lung (dl), decreased     Was seeing Dr. Jimenez before he left. Referred to pulmonary for follow up.          Relevant Orders    Referral to Pulmonology       Sleep    Sleep apnea     Sleep study done by Dr. Jimenez March 2023, needs to follow up with pulmonology re results. Referred to new pulmonolgist as he is gone.          Relevant Orders    Referral to Pulmonology     Other Visit Diagnoses       Recurrent cold sores        Relevant Medications    valACYclovir (Valtrex) 500 mg tablet          Time 69 minutes - all spent in direct patient care, reviewed Allscripts with patient, reviewed notes from specialists and testing done in interim.     Assessment, plans, tests, and follow up discussed with patient and patient verbalized understanding. Marni was given an opportunity to ask questions and  any concerns were addressed including but not limited to referrals, medications, follow up..

## 2023-10-04 ENCOUNTER — APPOINTMENT (OUTPATIENT)
Dept: PRIMARY CARE | Facility: CLINIC | Age: 63
End: 2023-10-04
Payer: COMMERCIAL

## 2023-10-04 PROBLEM — L01.00 IMPETIGO, UNSPECIFIED: Status: RESOLVED | Noted: 2021-04-02 | Resolved: 2023-10-04

## 2023-10-04 PROBLEM — L73.9 FOLLICULAR DISORDER, UNSPECIFIED: Status: RESOLVED | Noted: 2021-04-02 | Resolved: 2023-10-04

## 2023-10-04 PROBLEM — L82.0 INFLAMED SEBORRHEIC KERATOSIS: Status: RESOLVED | Noted: 2021-04-02 | Resolved: 2023-10-04

## 2023-10-04 PROBLEM — L66.2 FOLLICULITIS DECALVANS: Status: ACTIVE | Noted: 2021-04-02

## 2023-10-04 RX ORDER — VIBEGRON 75 MG/1
1 TABLET, FILM COATED ORAL DAILY
COMMUNITY
Start: 2023-09-15 | End: 2024-01-05 | Stop reason: SDUPTHER

## 2023-10-04 RX ORDER — OXYBUTYNIN CHLORIDE 10 MG/1
10 TABLET, EXTENDED RELEASE ORAL DAILY
COMMUNITY
Start: 2023-08-18 | End: 2024-02-14 | Stop reason: ALTCHOICE

## 2023-10-04 RX ORDER — DOXYCYCLINE HYCLATE 100 MG
100 TABLET ORAL 2 TIMES DAILY
COMMUNITY
Start: 2023-09-30 | End: 2024-02-01 | Stop reason: ALTCHOICE

## 2023-10-05 ENCOUNTER — ANTICOAGULATION - WARFARIN VISIT (OUTPATIENT)
Dept: CARDIOLOGY | Facility: CLINIC | Age: 63
End: 2023-10-05
Payer: COMMERCIAL

## 2023-10-05 DIAGNOSIS — I26.99 PULMONARY EMBOLISM AND INFARCTION (MULTI): Primary | ICD-10-CM

## 2023-10-06 DIAGNOSIS — G25.81 RLS (RESTLESS LEGS SYNDROME): Primary | ICD-10-CM

## 2023-10-06 RX ORDER — CARBIDOPA AND LEVODOPA 25; 100 MG/1; MG/1
1.5 TABLET ORAL DAILY
Qty: 135 TABLET | Refills: 2 | Status: SHIPPED | OUTPATIENT
Start: 2023-10-06

## 2023-10-09 ENCOUNTER — TELEPHONE (OUTPATIENT)
Dept: CARDIOLOGY | Facility: CLINIC | Age: 63
End: 2023-10-09
Payer: COMMERCIAL

## 2023-10-09 LAB
INR IN PPP BY COAGULATION ASSAY EXTERNAL: 1.9
PROTHROMBIN TIME (PT) IN PPP BY COAGULATION ASSAY EXTERNAL: NORMAL SECONDS

## 2023-10-09 NOTE — PROGRESS NOTES
Patient identification verified with 2 identifiers.    Location: [facility] Patient Self-Testing Program [phone]    Referring Physician: Dr. Adamaris Nguyen  Enrollment/ Re-enrollment date: 2024   INR Goal: 2.0-3.0  INR monitoring is per [facility] protocol.  Anticoagulation Medication: warfarin  Indication: PE    Subjective  Received faxed INR self test result and phoned patient.   Bleeding signs/symptoms: No    Bruising: No   Major bleeding event: No  Thrombosis signs/symptoms: No  Thromboembolic event: No  Missed doses: No  Extra doses: No  Medication changes: No Patient continues on Doxycycline  Dietary changes: No  Change in health: No  Change in activity: No  Alcohol: No  Other concerns: No    Upcoming Surgeries:  Does the Patient Have any upcoming surgeries that require interruption in anticoagulation therapy? no  Does the patient require bridging? no      Anticoagulation Summary  As of 10/5/2023      INR goal:  2.0-3.0   TTR:  --   INR used for dosin.90 (10/9/2023)   Weekly warfarin total:  35 mg               Assessment/Plan   Subtherapeutic     1. New dose:  Patient to take additional one time dose of 0.5 tablet today, weekly dose maintained.  Reviewed warfarin dose schedule with patient and patient read back dose instructions correctly.      2. Next INR: 1 week      Education provided to patient during the visit:  Patient instructed to call in interim with questions, concerns and changes.   Patient educated on interactions between medications and warfarin.   Patient educated on dietary consistency in vitamin k consumption.   Patient educated on affects of alcohol consumption while taking warfarin.   Patient educated on signs of bleeding/clotting.   Patient educated on compliance with dosing, follow up appointments, and prescribed plan of care.

## 2023-10-16 ENCOUNTER — ANTICOAGULATION - WARFARIN VISIT (OUTPATIENT)
Dept: CARDIOLOGY | Facility: CLINIC | Age: 63
End: 2023-10-16
Payer: COMMERCIAL

## 2023-10-16 DIAGNOSIS — I26.99 PULMONARY EMBOLISM AND INFARCTION (MULTI): ICD-10-CM

## 2023-10-16 LAB
INR IN PPP BY COAGULATION ASSAY EXTERNAL: 1.6 (ref 2–3)
PROTHROMBIN TIME (PT) IN PPP BY COAGULATION ASSAY EXTERNAL: ABNORMAL SECONDS

## 2023-10-16 NOTE — PROGRESS NOTES
Patient identification verified with 2 identifiers.    Location: [facility] Patient Self-Testing Program [phone]    Referring Physician: Dr. Adamaris Nguyen  Enrollment/ Re-enrollment date: 2024   INR Goal: 2.0-3.0  INR monitoring is per [facility] protocol.  Anticoagulation Medication: warfarin  Indication: PE    Subjective  Received INR self test result from pt via voicemail and phoned patient.   Bleeding signs/symptoms: No    Bruising: No   Major bleeding event: No  Thrombosis signs/symptoms: No  Thromboembolic event: No  Missed doses: No  Extra doses: No  Medication changes: No   Dietary changes: No  Change in health: No  Change in activity: No  Alcohol: No  Other concerns: No    Upcoming Surgeries:  Does the Patient Have any upcoming surgeries that require interruption in anticoagulation therapy? no  Does the patient require bridging? no      Anticoagulation Summary  As of 10/16/2023      INR goal:  2.0-3.0   TTR:  0.0 % (1.6 wk)   INR used for dosin.60 (10/16/2023)   Weekly warfarin total:  40 mg               Assessment/Plan   Subtherapeutic     1. New dose:  Total weekly dose increased from 37.5 mg last week to 40 mg for the coming week.     2. Next INR: 1 week      Education provided to patient during the visit:  Patient instructed to call in interim with questions, concerns and changes.   Patient educated on interactions between medications and warfarin.   Patient educated on dietary consistency in vitamin k consumption.   Patient educated on affects of alcohol consumption while taking warfarin.   Patient educated on signs of bleeding/clotting.   Patient educated on compliance with dosing, follow up appointments, and prescribed plan of care.

## 2023-10-23 ENCOUNTER — ANTICOAGULATION - WARFARIN VISIT (OUTPATIENT)
Dept: CARDIOLOGY | Facility: CLINIC | Age: 63
End: 2023-10-23
Payer: COMMERCIAL

## 2023-10-23 DIAGNOSIS — I26.99 PULMONARY EMBOLISM AND INFARCTION (MULTI): Primary | ICD-10-CM

## 2023-10-24 ENCOUNTER — ANTICOAGULATION - WARFARIN VISIT (OUTPATIENT)
Dept: CARDIOLOGY | Facility: CLINIC | Age: 63
End: 2023-10-24
Payer: COMMERCIAL

## 2023-10-24 DIAGNOSIS — I26.99 PULMONARY EMBOLISM AND INFARCTION (MULTI): Primary | ICD-10-CM

## 2023-10-24 LAB
INR IN PPP BY COAGULATION ASSAY EXTERNAL: 2.2
PROTHROMBIN TIME (PT) IN PPP BY COAGULATION ASSAY EXTERNAL: NORMAL SECONDS

## 2023-10-24 NOTE — PROGRESS NOTES
Patient identification verified with 2 identifiers.    Location: [facility] Patient Self-Testing Program [phone]    Referring Physician: Dr. Adamaris Nguyen  Enrollment/ Re-enrollment date: 24  INR Goal: 2.0-3.0  INR monitoring is per [facility] protocol.  Anticoagulation Medication: Jantoven  Indication: Pulmonary Embolism (PE)    Subjective   Bleeding signs/symptoms: No  Bruising: No   Major bleeding event: No  Thrombosis signs/symptoms: No  Thromboembolic event: No  Missed doses: No  Extra doses: No  Medication changes: No  Dietary changes: No  Change in health: No  Change in activity: No  Alcohol: No  Other concerns: No    Upcoming Surgeries:  Does the Patient Have any upcoming surgeries that require interruption in anticoagulation therapy? no  Does the patient require bridging? no      Anticoagulation Summary  As of 10/24/2023      INR goal:  2.0-3.0   TTR:  14.0 % (2.7 wk)   INR used for dosin.20 (10/24/2023)   Weekly warfarin total:  40 mg               Assessment/Plan   Therapeutic     1. New dose: no change    2. Next INR: 1 week      Education provided to patient during the visit:  Patient instructed to call in interim with questions, concerns and changes.   Patient educated on interactions between medications and warfarin.   Patient educated on dietary consistency in vitamin k consumption.   Patient educated on affects of alcohol consumption while taking warfarin.   Patient educated on signs of bleeding/clotting.   Patient educated on compliance with dosing, follow up appointments, and prescribed plan of care.

## 2023-10-31 ENCOUNTER — ANTICOAGULATION - WARFARIN VISIT (OUTPATIENT)
Dept: CARDIOLOGY | Facility: CLINIC | Age: 63
End: 2023-10-31
Payer: COMMERCIAL

## 2023-10-31 DIAGNOSIS — I26.99 PULMONARY EMBOLISM AND INFARCTION (MULTI): ICD-10-CM

## 2023-10-31 LAB
INR IN PPP BY COAGULATION ASSAY EXTERNAL: 2.3
PROTHROMBIN TIME (PT) IN PPP BY COAGULATION ASSAY EXTERNAL: NORMAL SECONDS

## 2023-10-31 NOTE — PROGRESS NOTES
Patient identification verified with 2 identifiers.    Location: [facility] Patient Self-Testing Program [phone]    Referring Physician: Dr. Adamaris Nguyen  Enrollment/ Re-enrollment date: 24  INR Goal: 2.0-3.0  INR monitoring is per [facility] protocol.  Anticoagulation Medication: Jantoven  Indication: Pulmonary Embolism (PE)    Subjective   Bleeding signs/symptoms: No  Bruising: No   Major bleeding event: No  Thrombosis signs/symptoms: No  Thromboembolic event: No  Missed doses: No  Extra doses: No  Medication changes: No  Dietary changes: No  Change in health: No  Change in activity: No  Alcohol: No  Other concerns: No    Upcoming Surgeries:  Does the Patient Have any upcoming surgeries that require interruption in anticoagulation therapy? no  Does the patient require bridging? no      Anticoagulation Summary  As of 10/31/2023      INR goal:  2.0-3.0   TTR:  37.2 % (3.7 wk)   INR used for dosin.30 (10/31/2023)   Weekly warfarin total:  40 mg               Assessment/Plan   Therapeutic     1. New dose: no change    2. Next INR: 2 weeks      Education provided to patient during the visit:  Patient instructed to call in interim with questions, concerns and changes.   Patient educated on interactions between medications and warfarin.   Patient educated on dietary consistency in vitamin k consumption.   Patient educated on affects of alcohol consumption while taking warfarin.   Patient educated on signs of bleeding/clotting.   Patient educated on compliance with dosing, follow up appointments, and prescribed plan of care.

## 2023-11-14 ENCOUNTER — ANTICOAGULATION - WARFARIN VISIT (OUTPATIENT)
Dept: CARDIOLOGY | Facility: CLINIC | Age: 63
End: 2023-11-14
Payer: COMMERCIAL

## 2023-11-14 DIAGNOSIS — I26.99 PULMONARY EMBOLISM AND INFARCTION (MULTI): ICD-10-CM

## 2023-11-15 ENCOUNTER — ANTICOAGULATION - WARFARIN VISIT (OUTPATIENT)
Dept: CARDIOLOGY | Facility: CLINIC | Age: 63
End: 2023-11-15
Payer: COMMERCIAL

## 2023-11-15 DIAGNOSIS — I26.99 PULMONARY EMBOLISM AND INFARCTION (MULTI): Primary | ICD-10-CM

## 2023-11-15 LAB
INR IN PPP BY COAGULATION ASSAY EXTERNAL: 3.6
INR IN PPP BY COAGULATION ASSAY EXTERNAL: 3.6
PROTHROMBIN TIME (PT) IN PPP BY COAGULATION ASSAY EXTERNAL: NORMAL SECONDS
PROTHROMBIN TIME (PT) IN PPP BY COAGULATION ASSAY EXTERNAL: NORMAL SECONDS

## 2023-11-15 NOTE — PROGRESS NOTES
Patient identification verified with 2 identifiers.    Location: [facility] Patient Self-Testing Program [phone]    Referring Physician: Dr. Adamaris Nguyen  Enrollment/ Re-enrollment date: 7/21/24  INR Goal: 2.0-3.0  INR monitoring is per [facility] protocol.  Anticoagulation Medication: Jantoven  Indication: Pulmonary Embolism (PE)    Subjective   Bleeding signs/symptoms: No    Bruising: No   Major bleeding event: No  Thrombosis signs/symptoms: No  Thromboembolic event: No  Missed doses: No  Extra doses: No  Medication changes: Yes  Pt started Doxycycline on 11/12/23 and will finish on 11/18/23.  Dietary changes: No  Change in health: No  Change in activity: No  Alcohol: No  Other concerns: No    Upcoming Surgeries:  Does the Patient Have any upcoming surgeries that require interruption in anticoagulation therapy? no  Does the patient require bridging? no      Anticoagulation Summary  As of 11/15/2023      INR goal:  2.0-3.0   TTR:  43.3 % (1.4 mo)   INR used for dosing:  3.60 (11/15/2023)   Weekly warfarin total:  40 mg               Assessment/Plan   Supratherapeutic     1. New dose:  Will hold today's dose and patient will retest in 5 days.     2. Next INR:  5 days      Education provided to patient during the visit:  Patient instructed to call in interim with questions, concerns and changes.

## 2023-11-15 NOTE — PROGRESS NOTES
Patient identification verified with 2 identifiers.    Location: [facility] Patient Self-Testing Program [phone]    Referring Physician: Dr. Adamaris Nguyen  Enrollment/ Re-enrollment date: 2024   INR Goal: 2.0-3.0  INR monitoring is per [facility] protocol.  Anticoagulation Medication: warfarin  Indication: PE    Subjective   Bleeding signs/symptoms: No  { ACOAG bleedin}  Bruising: No   Major bleeding event: No  Thrombosis signs/symptoms: No  Thromboembolic event: No  Missed doses: No  Extra doses: No  Medication changes: Yes  Patient started Doxycycline on 23 and will finish on 23.  Dietary changes: No  Change in health: No  Change in activity: No  Alcohol: No  Other concerns: No    Upcoming Surgeries:  Does the Patient Have any upcoming surgeries that require interruption in anticoagulation therapy? no  Does the patient require bridging? no      Anticoagulation Summary  As of 10/23/2023      INR goal:  2.0-3.0   TTR:  0.0 % (1.6 wk)   INR used for dosing:  3.60 (11/15/2023)   Weekly warfarin total:  40 mg               Assessment/Plan   Supratherapeutic     1. New dose: Will hold today's dose, maintain weekly dose and patient will retest on 23    2. Next INR:  5 days      Education provided to patient during the visit:  Patient instructed to call in interim with questions, concerns and changes.

## 2023-11-22 ENCOUNTER — ANTICOAGULATION - WARFARIN VISIT (OUTPATIENT)
Dept: CARDIOLOGY | Facility: CLINIC | Age: 63
End: 2023-11-22
Payer: COMMERCIAL

## 2023-11-22 DIAGNOSIS — I26.99 PULMONARY EMBOLISM AND INFARCTION (MULTI): Primary | ICD-10-CM

## 2023-11-22 NOTE — PROGRESS NOTES
Patient identification verified with 2 identifiers.    Location: [facility] Patient Self-Testing Program [phone]    Referring Physician: Dr. Adamaris Nguyen  Enrollment/ Re-enrollment date: 24  INR Goal: 2.0-3.0  INR monitoring is per [facility] protocol.  Anticoagulation Medication: Jantoven  Indication: Pulmonary Embolism (PE)    Subjective   Bleeding signs/symptoms: No    Bruising: No   Major bleeding event: No  Thrombosis signs/symptoms: No  Thromboembolic event: No  Missed doses: Yes  missed one dose  Extra doses: No  Medication changes: No  Dietary changes: No  Change in health: No  Change in activity: No  Alcohol: No  Other concerns: No    Upcoming Surgeries:  Does the Patient Have any upcoming surgeries that require interruption in anticoagulation therapy? no  Does the patient require bridging? no      Anticoagulation Summary  As of 2023      INR goal:  2.0-3.0   TTR:  43.3 % (1.4 mo)   INR used for dosin.50 (2023)   Weekly warfarin total:  42.5 mg               Assessment/Plan   Supratherapeutic     1. New dose: Increase TWD of warfarin  2. Next INR: 1 week    Education provided to patient during the visit:  Patient instructed to call in interim with questions, concerns and changes.    Called and spoke to patient who stated she will test her INR in the next hour.

## 2023-11-28 LAB
INR IN PPP BY COAGULATION ASSAY EXTERNAL: 1.5
PROTHROMBIN TIME (PT) IN PPP BY COAGULATION ASSAY EXTERNAL: NORMAL SECONDS

## 2023-12-01 DIAGNOSIS — G43.009 MIGRAINE WITHOUT AURA AND WITHOUT STATUS MIGRAINOSUS, NOT INTRACTABLE: Primary | ICD-10-CM

## 2023-12-01 RX ORDER — TOPIRAMATE 25 MG/1
100 TABLET ORAL DAILY
Qty: 120 TABLET | Refills: 0 | Status: SHIPPED | OUTPATIENT
Start: 2023-12-01 | End: 2024-01-04

## 2023-12-05 ENCOUNTER — ANTICOAGULATION - WARFARIN VISIT (OUTPATIENT)
Dept: CARDIOLOGY | Facility: CLINIC | Age: 63
End: 2023-12-05
Payer: COMMERCIAL

## 2023-12-05 DIAGNOSIS — I26.99 PULMONARY EMBOLISM AND INFARCTION (MULTI): Primary | ICD-10-CM

## 2023-12-06 LAB
INR IN PPP BY COAGULATION ASSAY EXTERNAL: 2.8 (ref 2–3)
PROTHROMBIN TIME (PT) IN PPP BY COAGULATION ASSAY EXTERNAL: NORMAL SECONDS

## 2023-12-06 NOTE — PROGRESS NOTES
Patient identification verified with 2 identifiers.    Location: [facility] Patient Self-Testing Program [phone]    Referring Physician: Dr. Adamaris Nguyen  Enrollment/ Re-enrollment date: 24  INR Goal: 2.0-3.0  INR monitoring is per [facility] protocol.  Anticoagulation Medication: Jantoven  Indication: Pulmonary Embolism (PE)    Subjective   Bleeding signs/symptoms: No    Bruising: No   Major bleeding event: No  Thrombosis signs/symptoms: No  Thromboembolic event: No  Missed doses: No  Extra doses: No  Medication changes: No  Dietary changes: No  Change in health: No  Change in activity: No  Alcohol: No  Other concerns: No    Upcoming Surgeries:  Does the Patient Have any upcoming surgeries that require interruption in anticoagulation therapy? no  Does the patient require bridging? no      Anticoagulation Summary  As of 2023      INR goal:  2.0-3.0   TTR:  46.5 % (2.1 mo)   INR used for dosin.80 (2023)   Weekly warfarin total:  42.5 mg               Assessment/Plan   Therapeutic     1. New dose: Total weekly dose not changed.  However pt believes she has only been taking 7.5mg doses for the past week or so.  Pt states she had consistently been taking 5mg daily until about a week ago when we increased it.  Dosing schedule was rearranged to even out weekly dosing but pt did not write it down and does not think she can remember the days.  She did state she will take the 7.5mg 3 days a week just insure if it will be those days. Discussed importance of consistency in dosing.  2. Next INR: 1 week    Education provided to patient during the visit:  Patient instructed to call in interim with questions, concerns and changes.   Patient educated on interactions between medications and warfarin.   Patient educated on dietary consistency in vitamin k consumption.   Patient educated on compliance with dosing, follow up appointments, and prescribed plan of care.

## 2023-12-13 ENCOUNTER — ANTICOAGULATION - WARFARIN VISIT (OUTPATIENT)
Dept: CARDIOLOGY | Facility: CLINIC | Age: 63
End: 2023-12-13
Payer: COMMERCIAL

## 2023-12-13 DIAGNOSIS — I26.99 PULMONARY EMBOLISM AND INFARCTION (MULTI): ICD-10-CM

## 2023-12-13 LAB
INR IN PPP BY COAGULATION ASSAY EXTERNAL: 2.9
PROTHROMBIN TIME (PT) IN PPP BY COAGULATION ASSAY EXTERNAL: NORMAL SECONDS

## 2023-12-13 NOTE — PROGRESS NOTES
Patient identification verified with 2 identifiers.    Location: [facility] Patient Self-Testing Program [phone]    Referring Physician: Dr. Adamaris Nguyen  Enrollment/ Re-enrollment date: 24  INR Goal: 2.0-3.0  INR monitoring is per [facility] protocol.  Anticoagulation Medication: Jantoven  Indication: Pulmonary Embolism (PE)    Subjective   Bleeding signs/symptoms: No    Bruising: No   Major bleeding event: No  Thrombosis signs/symptoms: No  Thromboembolic event: No  Missed doses: No  Extra doses: No  Medication changes: No  Dietary changes: No  Change in health: No  Change in activity: No  Alcohol: No  Other concerns: No    Upcoming Surgeries:  Does the Patient Have any upcoming surgeries that require interruption in anticoagulation therapy? no  Does the patient require bridging? no      Anticoagulation Summary  As of 2023      INR goal:  2.0-3.0   TTR:  52.0 % (2.3 mo)   INR used for dosin.90 (2023)   Weekly warfarin total:  42.5 mg               Assessment/Plan   Therapeutic       2. Next INR: 2 weeks    Education provided to patient during the visit:  Patient instructed to call in interim with questions, concerns and changes.   Patient educated on interactions between medications and warfarin.   Patient educated on dietary consistency in vitamin k consumption.   Patient educated on compliance with dosing, follow up appointments, and prescribed plan of care.

## 2023-12-26 DIAGNOSIS — R07.9 CHEST PAIN, UNSPECIFIED TYPE: Primary | ICD-10-CM

## 2023-12-26 RX ORDER — NITROGLYCERIN 0.4 MG/1
0.4 TABLET SUBLINGUAL EVERY 5 MIN PRN
Qty: 25 TABLET | Refills: 2 | Status: SHIPPED | OUTPATIENT
Start: 2023-12-26

## 2023-12-27 ENCOUNTER — ANTICOAGULATION - WARFARIN VISIT (OUTPATIENT)
Dept: CARDIOLOGY | Facility: CLINIC | Age: 63
End: 2023-12-27
Payer: COMMERCIAL

## 2023-12-27 DIAGNOSIS — I26.99 PULMONARY EMBOLISM AND INFARCTION (MULTI): ICD-10-CM

## 2023-12-28 ENCOUNTER — TELEPHONE (OUTPATIENT)
Dept: PRIMARY CARE | Facility: CLINIC | Age: 63
End: 2023-12-28
Payer: COMMERCIAL

## 2023-12-28 DIAGNOSIS — E11.9 TYPE 2 DIABETES MELLITUS WITHOUT COMPLICATION, WITHOUT LONG-TERM CURRENT USE OF INSULIN (MULTI): ICD-10-CM

## 2023-12-28 LAB
INR IN PPP BY COAGULATION ASSAY EXTERNAL: 1.6
PROTHROMBIN TIME (PT) IN PPP BY COAGULATION ASSAY EXTERNAL: NORMAL SECONDS

## 2023-12-28 NOTE — PROGRESS NOTES
Patient identification verified with 2 identifiers.    Location: [facility] Patient Self-Testing Program [phone]    Referring Physician: Adamaris Nguyen MD   Enrollment/ Re-enrollment date: 2024   INR Goal: 2.0-3.0  INR monitoring is per [facility] protocol.  Anticoagulation Medication: warfarin  Indication: Pulmonary Embolism (PE)    Subjective   Bleeding signs/symptoms: No    Bruising: No   Major bleeding event: No  Thrombosis signs/symptoms: No  Thromboembolic event: No  Missed doses: Yes  missed last night's 5 mg dose  Extra doses: No  Medication changes: No  Dietary changes: No  Change in health: No  Change in activity: No  Alcohol: No  Other concerns: No    Upcoming Surgeries:  Does the Patient Have any upcoming surgeries that require interruption in anticoagulation therapy? no  Does the patient require bridging? no      Anticoagulation Summary  As of 2023      INR goal:  2.0-3.0   TTR:  55.0 % (2.8 mo)   INR used for dosin.60 (2023)   Weekly warfarin total:  42.5 mg               Assessment/Plan   Subtherapeutic     1. New dose:  7.5 mg tonight only and then resume dose      2. Next INR: 1 week      Education provided to patient during the visit:  Patient instructed to call in interim with questions, concerns and changes.

## 2023-12-28 NOTE — TELEPHONE ENCOUNTER
Rx Refill Request Telephone Encounter    Name:  Marni Lugo  :  [id number]  Medication Name:      empagliflozin (Jardiance) 10 mg         Specific Pharmacy location:  [facility] #[id number] [place] [id number] AT [facility] [address]   Date of last appointment: 23   Date of next appointment:    Best number to reach patient: [phone]

## 2023-12-29 ENCOUNTER — TELEPHONE (OUTPATIENT)
Dept: PRIMARY CARE | Facility: CLINIC | Age: 63
End: 2023-12-29

## 2023-12-29 NOTE — TELEPHONE ENCOUNTER
Patient calling again regarding this refill.  Waiting on approval to pend due to 2 appointments cancelled by patient. One appointment cancelled by provider.    {Please Advise    This request was a telephone message.

## 2023-12-31 DIAGNOSIS — G43.009 MIGRAINE WITHOUT AURA AND WITHOUT STATUS MIGRAINOSUS, NOT INTRACTABLE: ICD-10-CM

## 2024-01-02 ENCOUNTER — TELEPHONE (OUTPATIENT)
Dept: NEUROLOGY | Facility: CLINIC | Age: 64
End: 2024-01-02
Payer: COMMERCIAL

## 2024-01-03 ENCOUNTER — ANTICOAGULATION - WARFARIN VISIT (OUTPATIENT)
Dept: CARDIOLOGY | Facility: CLINIC | Age: 64
End: 2024-01-03
Payer: COMMERCIAL

## 2024-01-03 DIAGNOSIS — I26.99 PULMONARY EMBOLISM AND INFARCTION (MULTI): ICD-10-CM

## 2024-01-03 LAB
INR IN PPP BY COAGULATION ASSAY EXTERNAL: 1
PROTHROMBIN TIME (PT) IN PPP BY COAGULATION ASSAY EXTERNAL: NORMAL SECONDS

## 2024-01-03 NOTE — PROGRESS NOTES
Patient identification verified with 2 identifiers.    Location: [facility] Patient Self-Testing Program [phone]    Referring Physician: DR. LARISSA BEARD  Enrollment/ Re-enrollment date: 2024   INR Goal: 2.0-3.0  INR monitoring is per [facility] protocol.  Anticoagulation Medication: warfarin  Indication: Pulmonary Embolism (PE)    Subjective   Bleeding signs/symptoms: No    Bruising: No   Major bleeding event: No  Thrombosis signs/symptoms: No  Thromboembolic event: No  Missed doses: Yes  PT REPORTS MISSING WARFARIN 23 THROUGH 23. SHE WAS NOT HOME AND DIDN'T HAVE HER WARFARIN WITH HER  Extra doses: No  Medication changes: No  Dietary changes: No  Change in health: No  Change in activity: No  Alcohol: No  Other concerns: No    Upcoming Surgeries:  Does the Patient Have any upcoming surgeries that require interruption in anticoagulation therapy? no  Does the patient require bridging? no      Anticoagulation Summary  As of 1/3/2024      INR goal:  2.0-3.0   TTR:  51.4 % (3 mo)   INR used for dosin.00 (1/3/2024)   Weekly warfarin total:  42.5 mg               Assessment/Plan   Subtherapeutic     1. New dose: PER PT HX SHE HAS MISSED WARFARIN DOSES IN THE PAST. PT WILL TAKE 7.5MG TODAY AND TOMORROW 24    2. Next INR:  2024      Education provided to patient during the visit:  Patient instructed to call in interim with questions, concerns and changes.   Patient educated on interactions between medications and warfarin.   Patient educated on dietary consistency in vitamin k consumption.   Patient educated on affects of alcohol consumption while taking warfarin.   Patient educated on signs of bleeding/clotting.   Patient educated on compliance with dosing, follow up appointments, and prescribed plan of care.

## 2024-01-04 ENCOUNTER — TELEMEDICINE (OUTPATIENT)
Dept: NEUROLOGY | Facility: CLINIC | Age: 64
End: 2024-01-04
Payer: COMMERCIAL

## 2024-01-04 ENCOUNTER — TELEPHONE (OUTPATIENT)
Dept: NEUROLOGY | Facility: CLINIC | Age: 64
End: 2024-01-04

## 2024-01-04 DIAGNOSIS — G25.81 RESTLESS LEGS SYNDROME: ICD-10-CM

## 2024-01-04 DIAGNOSIS — G43.009 MIGRAINE WITHOUT AURA AND WITHOUT STATUS MIGRAINOSUS, NOT INTRACTABLE: Primary | ICD-10-CM

## 2024-01-04 DIAGNOSIS — G56.03 BILATERAL CARPAL TUNNEL SYNDROME: ICD-10-CM

## 2024-01-04 PROCEDURE — 99213 OFFICE O/P EST LOW 20 MIN: CPT | Performed by: PSYCHIATRY & NEUROLOGY

## 2024-01-04 RX ORDER — TOPIRAMATE 25 MG/1
100 TABLET ORAL DAILY
Qty: 360 TABLET | Refills: 3 | Status: SHIPPED | OUTPATIENT
Start: 2024-01-04 | End: 2025-01-03

## 2024-01-04 NOTE — TELEPHONE ENCOUNTER
----- Message from Gunner Salgado MD sent at 1/4/2024  3:41 PM EST -----  8-month follow-up, should be in office

## 2024-01-04 NOTE — PROGRESS NOTES
Subjective     Marni Lugo is a 63 y.o. year old female seen in follow-up for migraine without aura, RLS, carpal tunnel syndrome.    HPI    This follow-up visit was conducted as an audio and visual telehealth clinical encounter. The patient was informed about the telehealth clinical encounter including benefits to avoiding travel, limitations of the assessment, and billing for the service.  In-office care may be recommended if needed.  Telehealth sessions are not being recorded and personal health information is protected.  All questions were answered and verbal consent from the patient (or guardian) was obtained to proceed.    63-year-old right-handed -American woman with past medical history significant for diabetes, chronic kidney disease, sarcoidosis, recurrent DVT on chronic warfarin anticoagulation and protein S deficiency, bilateral carpal tunnel release.     I have followed her since 2003, primarily for episodic migraine headaches. She has also had complaints consistent with small fiber neuropathy, restless legs syndrome and chronic low back pain. EMG of both upper extremities in February 2018 showed mild bilateral residual median neuropathy at the wrist and mild right ulnar neuropathy across the elbow with suspected dual localization to cubital tunnel and groove.     I evaluated her most recently on 9/6/2022 in the office.  As detailed in my ambulatory EMR note from that date she reported good control of her headaches with topiramate 100 mg nightly.  We discussed her bilateral median neuropathy at the wrist and I recommended she begin night splinting.  She indicated improvement in RLS symptoms with Sinemet 1.5 tablets of 25/100 mg immediate release strength nightly.    She is evaluated today by A/V visit.    She continues on topiramate 100 mg nightly which she is tolerating well, and reports that her headaches are under good control.  She notes a significant breakthrough headache only about once a  month.    Restless legs symptoms are also relatively well-controlled, on Sinemet same dose as above.    She has been having a lot of left knee pain recently.  This flared up about a week ago.  She is trying to be more physically active playing with her grandkids.  She occasionally notes that she drags the foot when walking but cannot say which side.  She denies falls and is not using assistive devices on a regular basis.    She denies vertigo.    She has baseline visual complaints OD with history of a retinal tear, but has not yet decided to go for ophthalmologic surgery.  She mainly complains of glare with night driving.    She is not wearing night splints but is somewhat more bothered by her hands lately, indicating some swelling in the fingers at times and some occasional discomfort.    Interval medical history is notable for hospitalization from 5/6-5/8/2023 for cellulitis involving the right pinna.  She was treated with antibiotics and it resolved.    Review of Systems    As per the history of present illness    Patient Active Problem List   Diagnosis    Amnestic MCI (mild cognitive impairment with memory loss)    Antiphospholipid antibody syndrome (CMS/HCC)    Asthma    Sleep apnea    Bilateral knee pain    CHF (congestive heart failure) (CMS/HCC)    Stage 3 chronic kidney disease (CMS/HCC)    Chronic midline low back pain without sciatica    Cystitis cystica    Diffusion capacity of lung (dl), decreased    Dry skin dermatitis    Erythema nodosum    Fuchs' endothelial dystrophy    History of DVT (deep vein thrombosis)    HLD (hyperlipidemia)    Hypertension    Inflammatory arthritis    Lumbar radiculopathy    Microscopic hematuria    Migraine without aura and without status migrainosus, not intractable    Obesity    Osteoarthritis, multiple sites    Overactive bladder    Pancreas cyst    Post-thrombotic syndrome    Type 2 diabetes mellitus without complication, without long-term current use of insulin  (CMS/HCC)    Raynaud's phenomenon without gangrene    Restless legs syndrome    Sarcoidosis    Seasonal allergic rhinitis    Small fiber neuropathy    Tubular adenoma of colon    Vitamin D deficiency    Xerostomia    Incarcerated umbilical hernia    Peripheral venous insufficiency    Other chest pain    Pulmonary embolism and infarction (CMS/HCC)    Folliculitis decalvans     Past Medical History:   Diagnosis Date    Abnormal finding of blood chemistry, unspecified 04/14/2014    Abnormal blood chemistry    Acute bronchospasm 04/18/2016    Bronchospasm    Acute candidiasis of vulva and vagina 07/21/2013    Vaginitis due to Candida albicans    Acute embolism and thrombosis of unspecified vein 02/18/2014    Venous thrombosis    Acute vaginitis 02/11/2016    Bacterial vaginosis    Adjustment disorder with mixed anxiety and depressed mood 04/06/2023    Age-related nuclear cataract, bilateral 10/18/2022    Nuclear sclerosis of both eyes    Anesthesia of skin 03/16/2018    Numbness and tingling in both hands    Bitten or stung by nonvenomous insect and other nonvenomous arthropods, initial encounter 09/01/2016    Bug bite, initial encounter    Carpal tunnel syndrome 04/06/2023    Cervical lymphadenitis 04/06/2023    Chronic pain of left ankle 04/06/2023    Diverticulitis of large intestine without perforation or abscess without bleeding 07/21/2013    Diverticulitis of colon    Dry eye syndrome of unspecified lacrimal gland 10/18/2022    Dry eye syndrome    Encounter for follow-up examination after completed treatment for conditions other than malignant neoplasm 11/18/2015    Hospital discharge follow-up    Encounter for immunization 02/20/2016    Immunization due    Encounter for other preprocedural examination 10/09/2015    Pre-operative clearance    Encounter for screening for infections with a predominantly sexual mode of transmission 03/16/2018    Screening for STD (sexually transmitted disease)    Encounter for  screening for infections with a predominantly sexual mode of transmission 02/28/2018    Screening for STD (sexually transmitted disease)    Encounter for screening for infections with a predominantly sexual mode of transmission 02/28/2018    Screening for STD (sexually transmitted disease)    Encounter for screening for malignant neoplasm of colon 11/23/2020    Encounter for screening colonoscopy    Excessive and frequent menstruation with irregular cycle 07/21/2013    Metrorrhagia    Facial cellulitis 05/17/2023    Follicular disorder, unspecified 04/02/2021    Hemorrhage of anus and rectum 05/09/2014    Rectal hemorrhage    Hot flashes, menopausal 04/06/2023    Hyperlipidemia, unspecified 07/21/2013    Hyperlipidemia    Impetigo, unspecified 04/02/2021    Incarcerated umbilical hernia 04/06/2023    Surgery 4/1/23. Patient doing well. Dr. Tawanda Munoz.     Inflamed seborrheic keratosis 04/02/2021    Lateral epicondylitis, unspecified elbow 03/16/2018    Tennis elbow    Lateral epicondylitis, unspecified elbow 02/28/2018    Tennis elbow    Lateral epicondylitis, unspecified elbow 02/28/2018    Tennis elbow    Left sided colitis without complications (CMS/[facility]) 07/21/2013    Ulcerative colitis, left sided    Lumbosacral spondylosis 04/06/2023    Myopia, bilateral 10/18/2022    Myopia of both eyes with astigmatism and presbyopia    Nevus of left conjunctiva 04/06/2023    Obstructive sleep apnea (adult) (pediatric) 07/21/2013    Obstructive sleep apnea    Other chest pain 05/08/2019    Chest tightness    Other conditions influencing health status 12/28/2020    Colonoscopy planned    Other conditions influencing health status 05/08/2019    History of cough    Other enthesopathies, not elsewhere classified 05/06/2014    Tendinitis of right shoulder    Other hypersomnia 11/08/2017    Excessive daytime sleepiness    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    Protein S deficiency    Other specified abnormal  findings of blood chemistry 10/07/2016    Elevated serum creatinine    Other symptoms and signs involving the genitourinary system 11/23/2020    Abnormal urogenital discharge    Other symptoms and signs involving the nervous system 04/13/2017    Suspected sleep apnea    Personal history of diseases of the blood and blood-forming organs and certain disorders involving the immune mechanism 10/18/2022    History of sarcoidosis    Personal history of diseases of the skin and subcutaneous tissue     History of dermatitis    Personal history of other diseases of the circulatory system 07/10/2020    History of congestive heart failure    Personal history of other diseases of the female genital tract 10/26/2020    History of abnormal uterine bleeding    Personal history of other diseases of the musculoskeletal system and connective tissue 01/16/2018    History of lateral epicondylitis    Personal history of other diseases of the respiratory system 07/21/2013    History of acute bronchitis    Personal history of other diseases of the respiratory system 04/14/2014    Personal history of sinusitis    Personal history of other diseases of urinary system 02/02/2022    History of hematuria    Personal history of other diseases of urinary system 11/01/2021    History of hematuria    Personal history of other infectious and parasitic diseases 04/14/2014    History of trichomoniasis    Personal history of other infectious and parasitic diseases     History of herpes simplex infection    Personal history of other specified conditions 07/21/2020    History of chest pain    Personal history of other specified conditions 01/18/2017    History of dysuria    Personal history of other specified conditions 11/23/2020    History of diarrhea    Personal history of other specified conditions 07/14/2022    History of itching    Personal history of other specified conditions 10/14/2020    History of dyspnea    Personal history of other specified  conditions 06/18/2016    History of excessive sweating    Personal history of other specified conditions 07/26/2016    History of diarrhea    Personal history of other specified conditions 06/18/2016    History of nausea    Personal history of peptic ulcer disease     History of peptic ulcer    Personal history of pulmonary embolism 08/21/2020    History of pulmonary embolism    Personal history of urinary (tract) infections 11/05/2015    History of urinary tract infection    Piriformis syndrome of right side 04/06/2023    Postnasal drip 04/14/2014    Post-nasal drainage    Primary osteoarthritis of first carpometacarpal joint of left hand 04/06/2023    Rash and other nonspecific skin eruption 07/14/2022    Rash    Right upper quadrant abdominal tenderness 05/22/2017    Abdominal tenderness, right upper quadrant    Spondylolisthesis, acquired 04/06/2023    Type 2 diabetes mellitus (CMS/[facility]) 11/21/2022    Formatting of this note might be different from the original. Comment on above: Added by Problem List Migration; 2013-7-21; Moved to [facility] Nov 23 2013 4:10PM;    Type 2 diabetes mellitus without complication, without long-term current use of insulin (CMS/[facility]) 04/06/2023    Ulcerative blepharitis unspecified eye, unspecified eyelid 09/21/2018    Ulcerative blepharitis    Ulnar neuropathy at elbow of right upper extremity 04/06/2023    Unspecified exophthalmos 07/21/2013    Exophthalmos    Unspecified exophthalmos 10/18/2022    Ocular proptosis    Urinary tract infection, site not specified 04/14/2014    Acute UTI    Vascular disorder of intestine, unspecified (CMS/HCC) 07/21/2013    Ischemic colitis     Past Surgical History:   Procedure Laterality Date    CT ABDOMEN PELVIS ANGIOGRAM W AND/OR WO IV CONTRAST  11/12/2015    CT ABDOMEN PELVIS ANGIOGRAM W AND/OR WO IV CONTRAST 11/12/2015 [facility] CL[facility]ICAL [facility]GAC[facility]    CT ABDOMEN PELVIS ANGIOGRAM W AND/OR WO IV CONTRAST  5/8/2022    CT ABDOMEN PELVIS ANGIOGRAM W AND/OR WO  [facility]    CT ABDOMEN PELVIS ANGIOGRAM W AND/OR WO IV CONTRAST  2023    CT ABDOMEN PELVIS ANGIOGRAM W AND/OR WO IV CONTRAST U CT    HYSTEROSCOPY  2014    Hysteroscopy With Endometrial Ablation    OTHER SURGICAL HISTORY  2014    Left Hemicolectomy    TONSILLECTOMY  2014    Tonsillectomy    TOTAL VAGINAL HYSTERECTOMY  2014    Vaginal Hysterectomy    TUBAL LIGATION  2014    Tubal Ligation     Social History     Tobacco Use    Smoking status: Former     Packs/day: 0.25     Years: 4.00     Additional pack years: 0.00     Total pack years: 1.00     Types: Cigarettes     Start date:      Quit date:      Years since quittin.0    Smokeless tobacco: Not on file   Substance Use Topics    Alcohol use: Yes     Comment: wine every now and then     family history includes Cirrhosis in her sister; Heart disease in her mother; Kidney failure in her father.    Current Outpatient Medications:     albuterol 2.5 mg /3 mL (0.083 %) nebulizer solution, Take 3 mL (2.5 mg) by nebulization every 6 hours if needed., Disp: , Rfl:     albuterol 90 mcg/actuation inhaler, Inhale 2 puffs every 6 hours if needed for shortness of breath or wheezing., Disp: 18 g, Rfl: 2    amLODIPine (Norvasc) 10 mg tablet, Take 1 tablet (10 mg) by mouth once daily., Disp: 90 tablet, Rfl: 3    atorvastatin (Lipitor) 40 mg tablet, Take 1 tablet (40 mg) by mouth once daily., Disp: 90 tablet, Rfl: 3    carbidopa-levodopa (Sinemet)  mg tablet, TAKE 1 AND 1/2 TABLETS BY MOUTH DAILY, Disp: 135 tablet, Rfl: 2    colloidal oatmeaL (Eucerin Eczema Relief) 1 % cream, 4 times a day., Disp: , Rfl:     cyclobenzaprine (Flexeril) 5 mg tablet, Take 1 tablet (5 mg) by mouth as needed at bedtime for muscle spasms., Disp: 30 tablet, Rfl: 0    doxycycline (Vibra-Tabs) 100 mg tablet, Take 1 tablet (100 mg) by mouth 2 times a day., Disp: , Rfl:     empagliflozin (Jardiance) 10 mg, Take 1 tablet (10  mg) by mouth once daily., Disp: 30 tablet, Rfl: 0    famotidine (Pepcid) 20 mg tablet, Take 1 tablet (20 mg) by mouth 2 times a day., Disp: , Rfl:     fluticasone (Flonase) 50 mcg/actuation nasal spray, SHAKE LIQUID AND USE 1 TO 2 SPRAYS IN EACH NOSTRIL DAILY AS NEEDED FOR SYMPTOMS OR ALLERGY, Disp: , Rfl:     furosemide (Lasix) 20 mg tablet, TAKE 1 TABLET BY MOUTH EVERY DAY AS NEEDED FOR LEG SWELLING, Disp: 90 tablet, Rfl: 3    Gemtesa 75 mg tablet, Take 1 tablet (75 mg) by mouth once daily., Disp: , Rfl:     Jantoven 5 mg tablet, Take by mouth., Disp: , Rfl:     nitroglycerin (Nitrostat) 0.4 mg SL tablet, Place 1 tablet (0.4 mg) under the tongue every 5 minutes if needed for chest pain., Disp: 25 tablet, Rfl: 2    oxybutynin XL (Ditropan-XL) 10 mg 24 hr tablet, Take 1 tablet (10 mg) by mouth once daily., Disp: , Rfl:     pantoprazole (ProtoNix) 40 mg EC tablet, Take 1 tablet (40 mg) by mouth once daily., Disp: , Rfl:     polyethylene glycol (Glycolax) 17 gram packet, Take 17 g by mouth once daily as needed (constipation)., Disp: , Rfl:     sildenafil (Revatio) 20 mg tablet, Take 1 tablet (20 mg) by mouth 2 times a day., Disp: , Rfl:     sodium chloride (Ayr) 0.65 % nasal drops, Administer 1 drop into each nostril 4 times a day as needed., Disp: , Rfl:     topiramate (Topamax) 25 mg tablet, Take 4 tablets (100 mg) by mouth once daily., Disp: 360 tablet, Rfl: 3    traMADol (Ultram) 50 mg tablet, Take 1 tablet (50 mg) by mouth 2 times a day., Disp: , Rfl:     valACYclovir (Valtrex) 500 mg tablet, Take 1 tablet (500 mg) by mouth once daily., Disp: 90 tablet, Rfl: 3  Allergies   Allergen Reactions    Adhesive Unknown    Latex Rash, Hives, Itching and Swelling       Objective   Neurological Exam  Physical Exam    Mental status: Clear sensorium without fluctuation.  Appropriate in conversation.  Fluent unremarkable speech without paraphasic errors.    Cranial nerves: Extraocular movements were intact and conjugate  without nystagmus.  No ptosis.  Symmetrically intact facial motor function.  Grossly intact hearing.  No dysarthria or dysphonia.  Midline tongue protrusion.      Assessment/Plan     She is evaluated after a long interval and reports ongoing good headache control on topiramate 100 mg nightly.  She tolerates topiramate well at the present dose and I sent a refill order to her pharmacy.    RLS is well-controlled on Sinemet 1.5 tablets nightly and I recommended continuing the present dose.    I again encouraged her to begin night splinting the wrists for carpal tunnel syndrome.  She indicates an intention to do so.    I advised her to follow-up in the office in 8 months.

## 2024-01-05 DIAGNOSIS — N32.81 OVERACTIVE BLADDER: Primary | ICD-10-CM

## 2024-01-08 ENCOUNTER — ANTICOAGULATION - WARFARIN VISIT (OUTPATIENT)
Dept: CARDIOLOGY | Facility: CLINIC | Age: 64
End: 2024-01-08
Payer: COMMERCIAL

## 2024-01-08 DIAGNOSIS — I26.99 PULMONARY EMBOLISM AND INFARCTION (MULTI): ICD-10-CM

## 2024-01-08 RX ORDER — VIBEGRON 75 MG/1
1 TABLET, FILM COATED ORAL DAILY
Qty: 30 TABLET | Refills: 11 | Status: SHIPPED | OUTPATIENT
Start: 2024-01-08

## 2024-01-08 NOTE — PROGRESS NOTES
Patient identification verified with 2 identifiers.    Location: [facility] Patient Self-Testing Program [phone]    Referring Physician: LARISSA BEARD  Enrollment/ Re-enrollment date: 2024   INR Goal: 2.0-3.0  INR monitoring is per [facility] protocol.  Anticoagulation Medication: warfarin  Indication: Pulmonary Embolism (PE)    Subjective   Bleeding signs/symptoms: No    Bruising: No   Major bleeding event: No  Thrombosis signs/symptoms: No  Thromboembolic event: No  Missed doses: No  Extra doses: No  Medication changes: No  Dietary changes: No  Change in health: No  Change in activity: No  Alcohol: No  Other concerns: No    Upcoming Surgeries:  Does the Patient Have any upcoming surgeries that require interruption in anticoagulation therapy? no  Does the patient require bridging? no      Anticoagulation Summary  As of 2024      INR goal:  2.0-3.0   TTR:  49.8 % (3.2 mo)   INR used for dosin.30 (2024)   Weekly warfarin total:  42.5 mg               Assessment/Plan   Therapeutic     1. New dose: no change    2. Next INR: 1 week      Education provided to patient during the visit:  Patient instructed to call in interim with questions, concerns and changes.   Patient educated on interactions between medications and warfarin.   Patient educated on dietary consistency in vitamin k consumption.   Patient educated on affects of alcohol consumption while taking warfarin.   Patient educated on signs of bleeding/clotting.   Patient educated on compliance with dosing, follow up appointments, and prescribed plan of care.

## 2024-01-15 ENCOUNTER — ANTICOAGULATION - WARFARIN VISIT (OUTPATIENT)
Dept: CARDIOLOGY | Facility: CLINIC | Age: 64
End: 2024-01-15
Payer: COMMERCIAL

## 2024-01-15 DIAGNOSIS — I26.99 PULMONARY EMBOLISM AND INFARCTION (MULTI): Primary | ICD-10-CM

## 2024-01-15 LAB
INR IN PPP BY COAGULATION ASSAY EXTERNAL: 4.4 (ref 2–3)
PROTHROMBIN TIME (PT) IN PPP BY COAGULATION ASSAY EXTERNAL: ABNORMAL SECONDS

## 2024-01-15 NOTE — PROGRESS NOTES
Patient identification verified with 2 identifiers.    Location: [facility] Patient Self-Testing Program [phone]    Referring Physician: LARISSA BEARD  Enrollment/ Re-enrollment date: 2024   INR Goal: 2.0-3.0  INR monitoring is per [facility] protocol.  Anticoagulation Medication: warfarin  Indication: Pulmonary Embolism (PE)    Subjective   Bleeding signs/symptoms: No    Bruising: No   Major bleeding event: No  Thrombosis signs/symptoms: No  Thromboembolic event: No  Missed doses: No  Extra doses: No  Medication changes: Yes  Dietary changes: No  Change in health: Yes  Change in activity: No  Alcohol: No  Other concerns: No    Upcoming Surgeries:  Does the Patient Have any upcoming surgeries that require interruption in anticoagulation therapy? no  Does the patient require bridging? no      Anticoagulation Summary  As of 1/15/2024      INR goal:  2.0-3.0   TTR:  48.7 % (3.4 mo)   INR used for dosin.40 (1/15/2024)   Weekly warfarin total:  42.5 mg               Assessment/Plan   Supratherapeutic     1. New dose:  hold coumadin x 2 days 1/15 & .  Resume normal dosing schedule on .  Dosing schedule confirmed with pt.  Dr. Beard notified of plan via secure chat.  2. Next INR: 1 week      Education provided to patient during the visit:  Patient instructed to call in interim with questions, concerns and changes.   Patient educated on interactions between medications and warfarin.   Patient educated on dietary consistency in vitamin k consumption.   Patient educated on affects of alcohol consumption while taking warfarin.   Patient educated on signs of bleeding/clotting.   Patient educated on compliance with dosing, follow up appointments, and prescribed plan of care.

## 2024-01-22 ENCOUNTER — ANTICOAGULATION - WARFARIN VISIT (OUTPATIENT)
Dept: CARDIOLOGY | Facility: CLINIC | Age: 64
End: 2024-01-22
Payer: COMMERCIAL

## 2024-01-22 DIAGNOSIS — I26.99 PULMONARY EMBOLISM AND INFARCTION (MULTI): ICD-10-CM

## 2024-01-22 LAB
INR IN PPP BY COAGULATION ASSAY EXTERNAL: 2.4
PROTHROMBIN TIME (PT) IN PPP BY COAGULATION ASSAY EXTERNAL: NORMAL SECONDS

## 2024-01-22 NOTE — PROGRESS NOTES
Patient identification verified with 2 identifiers.    Location: [facility] Patient Self-Testing Program [phone]    Referring Physician: DR. LARISSA BEARD  Enrollment/ Re-enrollment date: 2024   INR Goal: 2.0-3.0  INR monitoring is per [facility] protocol.  Anticoagulation Medication: warfarin  Indication: Pulmonary Embolism (PE)    Subjective   Bleeding signs/symptoms: No    Bruising: No   Major bleeding event: No  Thrombosis signs/symptoms: No  Thromboembolic event: No  Missed doses: No  Extra doses: No  Medication changes: No  Dietary changes: No  Change in health: No  Change in activity: No  Alcohol: No  Other concerns: No    Upcoming Surgeries:  Does the Patient Have any upcoming surgeries that require interruption in anticoagulation therapy? no  Does the patient require bridging? no      Anticoagulation Summary  As of 2024      INR goal:  2.0-3.0   TTR:  47.5 % (3.6 mo)   INR used for dosin.40 (2024)   Weekly warfarin total:  42.5 mg               Assessment/Plan   Therapeutic     1. New dose: no change  CALLED AND SPOKE TO PT. CONFIRMED CURRENT DOSING PLAN. PT VERBALIZED BACK CORRECTLY  2. Next INR: 1 week      Education provided to patient during the visit:  Patient instructed to call in interim with questions, concerns and changes.   Patient educated on interactions between medications and warfarin.   Patient educated on dietary consistency in vitamin k consumption.   Patient educated on affects of alcohol consumption while taking warfarin.   Patient educated on signs of bleeding/clotting.   Patient educated on compliance with dosing, follow up appointments, and prescribed plan of care.

## 2024-01-29 ENCOUNTER — ANTICOAGULATION - WARFARIN VISIT (OUTPATIENT)
Dept: CARDIOLOGY | Facility: CLINIC | Age: 64
End: 2024-01-29
Payer: COMMERCIAL

## 2024-01-29 DIAGNOSIS — I26.99 PULMONARY EMBOLISM AND INFARCTION (MULTI): ICD-10-CM

## 2024-01-29 LAB
INR IN PPP BY COAGULATION ASSAY EXTERNAL: 5.3
PROTHROMBIN TIME (PT) IN PPP BY COAGULATION ASSAY EXTERNAL: NORMAL SECONDS

## 2024-01-29 NOTE — PROGRESS NOTES
Patient identification verified with 2 identifiers.    Location: [facility] Patient Self-Testing Program [phone]    Referring Physician: DR. LARISSA BEARD  Enrollment/ Re-enrollment date: 2024   INR Goal: 2.0-3.0  INR monitoring is per [facility] protocol.  Anticoagulation Medication: warfarin  Indication: Pulmonary Embolism (PE)    Subjective   Bleeding signs/symptoms: No    Bruising: No   Major bleeding event: No  Thrombosis signs/symptoms: No  Thromboembolic event: No  Missed doses: No  Extra doses: No  Medication changes: No  Dietary changes: No  Change in health: No  Change in activity: No  Alcohol: No  Other concerns: No    Upcoming Surgeries:  Does the Patient Have any upcoming surgeries that require interruption in anticoagulation therapy? no  Does the patient require bridging? no      Anticoagulation Summary  As of 2024      INR goal:  2.0-3.0   TTR:  45.9 % (3.9 mo)   INR used for dosin.30 (2024)   Weekly warfarin total:  42.5 mg               Assessment/Plan   Supratherapeutic     1. New dose:  CALLED AND SPOKE TO PT. NO IDENTIFIABLE REASON FOR ELEVATED INR TODAY. NO BLEEDING ISSUES. CONTACTED DR. LARISSA BEARD WHO AGREES WITH POC TO HOLD TODAY AND TOMORROWS DOSE AND RETEST 24. PT WILL EAT SOME VIT K TODAY AND GO TO ER IF ANY BLEEDING ISSUES SHOULD OCCUR.   PT VERBALIZED UNDERSTANDING OF INSTRUCTIONS.   2. Next INR:  2 DAYS      Education provided to patient during the visit:  Patient instructed to call in interim with questions, concerns and changes.   Patient educated on interactions between medications and warfarin.   Patient educated on dietary consistency in vitamin k consumption.   Patient educated on affects of alcohol consumption while taking warfarin.   Patient educated on signs of bleeding/clotting.   Patient educated on compliance with dosing, follow up appointments, and prescribed plan of care.

## 2024-01-31 ENCOUNTER — LAB (OUTPATIENT)
Dept: LAB | Facility: LAB | Age: 64
End: 2024-01-31
Payer: COMMERCIAL

## 2024-01-31 ENCOUNTER — APPOINTMENT (OUTPATIENT)
Dept: PRIMARY CARE | Facility: CLINIC | Age: 64
End: 2024-01-31
Payer: COMMERCIAL

## 2024-01-31 ENCOUNTER — ANTICOAGULATION - WARFARIN VISIT (OUTPATIENT)
Dept: ANTICOAGULATION | Facility: HOSPITAL | Age: 64
End: 2024-01-31

## 2024-01-31 DIAGNOSIS — E11.9 TYPE 2 DIABETES MELLITUS WITHOUT COMPLICATION, WITHOUT LONG-TERM CURRENT USE OF INSULIN (MULTI): ICD-10-CM

## 2024-01-31 DIAGNOSIS — E55.9 VITAMIN D DEFICIENCY: ICD-10-CM

## 2024-01-31 DIAGNOSIS — I10 HYPERTENSION, UNSPECIFIED TYPE: ICD-10-CM

## 2024-01-31 DIAGNOSIS — E66.09 CLASS 1 OBESITY DUE TO EXCESS CALORIES WITH SERIOUS COMORBIDITY AND BODY MASS INDEX (BMI) OF 34.0 TO 34.9 IN ADULT: ICD-10-CM

## 2024-01-31 DIAGNOSIS — N18.31 STAGE 3A CHRONIC KIDNEY DISEASE (MULTI): ICD-10-CM

## 2024-01-31 DIAGNOSIS — E78.2 MIXED HYPERLIPIDEMIA: ICD-10-CM

## 2024-01-31 DIAGNOSIS — I26.99 PULMONARY EMBOLISM AND INFARCTION (MULTI): ICD-10-CM

## 2024-01-31 DIAGNOSIS — I50.9 CONGESTIVE HEART FAILURE, UNSPECIFIED HF CHRONICITY, UNSPECIFIED HEART FAILURE TYPE (MULTI): ICD-10-CM

## 2024-01-31 LAB
ALBUMIN SERPL BCP-MCNC: 4.2 G/DL (ref 3.4–5)
ALP SERPL-CCNC: 138 U/L (ref 33–136)
ALT SERPL W P-5'-P-CCNC: 15 U/L (ref 7–45)
ANION GAP SERPL CALC-SCNC: 11 MMOL/L (ref 10–20)
AST SERPL W P-5'-P-CCNC: 18 U/L (ref 9–39)
BILIRUB SERPL-MCNC: 0.6 MG/DL (ref 0–1.2)
BUN SERPL-MCNC: 12 MG/DL (ref 6–23)
CALCIUM SERPL-MCNC: 9 MG/DL (ref 8.6–10.3)
CHLORIDE SERPL-SCNC: 113 MMOL/L (ref 98–107)
CHOLEST SERPL-MCNC: 205 MG/DL (ref 0–199)
CHOLESTEROL/HDL RATIO: 4.1
CO2 SERPL-SCNC: 23 MMOL/L (ref 21–32)
CREAT SERPL-MCNC: 1.06 MG/DL (ref 0.5–1.05)
CREAT UR-MCNC: 59.3 MG/DL (ref 20–320)
EGFRCR SERPLBLD CKD-EPI 2021: 60 ML/MIN/1.73M*2
GLUCOSE SERPL-MCNC: 116 MG/DL (ref 74–99)
HDLC SERPL-MCNC: 49.4 MG/DL
INR IN PPP BY COAGULATION ASSAY EXTERNAL: 2.7
LDLC SERPL CALC-MCNC: 118 MG/DL
MICROALBUMIN UR-MCNC: 100.7 MG/L
MICROALBUMIN/CREAT UR: 169.8 UG/MG CREAT
NON HDL CHOLESTEROL: 156 MG/DL (ref 0–149)
POTASSIUM SERPL-SCNC: 4 MMOL/L (ref 3.5–5.3)
PROT SERPL-MCNC: 6.8 G/DL (ref 6.4–8.2)
PROTHROMBIN TIME (PT) IN PPP BY COAGULATION ASSAY EXTERNAL: NORMAL SECONDS
SODIUM SERPL-SCNC: 143 MMOL/L (ref 136–145)
T4 FREE SERPL-MCNC: 0.67 NG/DL (ref 0.61–1.12)
TRIGL SERPL-MCNC: 188 MG/DL (ref 0–149)
TSH SERPL-ACNC: 1.95 MIU/L (ref 0.44–3.98)
VLDL: 38 MG/DL (ref 0–40)

## 2024-01-31 PROCEDURE — 36415 COLL VENOUS BLD VENIPUNCTURE: CPT

## 2024-01-31 PROCEDURE — 80053 COMPREHEN METABOLIC PANEL: CPT

## 2024-01-31 PROCEDURE — 82043 UR ALBUMIN QUANTITATIVE: CPT

## 2024-01-31 PROCEDURE — 80061 LIPID PANEL: CPT

## 2024-01-31 PROCEDURE — 84443 ASSAY THYROID STIM HORMONE: CPT

## 2024-01-31 PROCEDURE — 82652 VIT D 1 25-DIHYDROXY: CPT

## 2024-01-31 PROCEDURE — 82570 ASSAY OF URINE CREATININE: CPT

## 2024-01-31 PROCEDURE — 84439 ASSAY OF FREE THYROXINE: CPT

## 2024-01-31 PROCEDURE — 83036 HEMOGLOBIN GLYCOSYLATED A1C: CPT

## 2024-01-31 NOTE — PROGRESS NOTES
Patient identification verified with 2 identifiers.    Location: [facility] Patient Self-Testing Program [phone]    Referring Physician: DR. LARISSA BEARD  Enrollment/ Re-enrollment date: 2024   INR Goal: 2.0-3.0  INR monitoring is per [facility] protocol.  Anticoagulation Medication: warfarin  Indication: Pulmonary Embolism (PE)    Subjective   Bleeding signs/symptoms: No    Bruising: No   Major bleeding event: No  Thrombosis signs/symptoms: No  Thromboembolic event: No  Missed doses: No  Extra doses: No  Medication changes: No  Dietary changes: No  Change in health: No  Change in activity: No  Alcohol: No  Other concerns: No    Upcoming Surgeries:  Does the Patient Have any upcoming surgeries that require interruption in anticoagulation therapy? no  Does the patient require bridging? no      Anticoagulation Summary  As of 2024      INR goal:  2.0-3.0   TTR:  45.2 % (3.9 mo)   INR used for dosin.70 (2024)   Weekly warfarin total:  42.5 mg               Assessment/Plan    Therapeutic    1. New dose: maintain   2. Next INR:  1 week      Education provided to patient during the visit:  Patient instructed to call in interim with questions, concerns and changes.   Patient educated on interactions between medications and warfarin.   Patient educated on dietary consistency in vitamin k consumption.   Patient educated on affects of alcohol consumption while taking warfarin.   Patient educated on signs of bleeding/clotting.   Patient educated on compliance with dosing, follow up appointments, and prescribed plan of care.

## 2024-02-01 ENCOUNTER — APPOINTMENT (OUTPATIENT)
Dept: PRIMARY CARE | Facility: CLINIC | Age: 64
End: 2024-02-01
Payer: COMMERCIAL

## 2024-02-01 PROBLEM — M24.572 CONTRACTURE, LEFT ANKLE: Status: ACTIVE | Noted: 2024-02-01

## 2024-02-01 PROBLEM — K42.0 INCARCERATED UMBILICAL HERNIA: Status: RESOLVED | Noted: 2023-04-06 | Resolved: 2024-02-01

## 2024-02-01 PROBLEM — R26.2 DISABILITY OF WALKING: Status: ACTIVE | Noted: 2024-02-01

## 2024-02-01 LAB
EST. AVERAGE GLUCOSE BLD GHB EST-MCNC: 154 MG/DL
HBA1C MFR BLD: 7 %

## 2024-02-01 NOTE — PROGRESS NOTES
Subjective   Patient ID: Marni Lugo is a 63 y.o. female who presents for Medicare Wellness.    HPI     Patient Care Team:  Gunjan Salvador MD as PCP - General (Family Medicine)  Gunjan Salvador MD as PCP - [employer] PCP  Tawanda Munoz MD as Surgeon (General Surgery)  Sakshi Jimenez MD as Surgeon (Pulmonary Disease)  David Hwang MD as Consulting Physician (Rheumatology)  Gunner Salgado MD as Consulting Physician (Neurology)  Mariam Smith MD as Consulting Physician (Nephrology)  Richardson Escobedo MD as Surgeon ([facility])  Adamaris Nguyen MD as Consulting Physician (Cardiology)  Rikki Mcdonnell MD as Surgeon (Gastroenterology)  Rick Snider MD as Surgeon (Urology)  Mell Pacheco MD PhD as Anesthesiologist (Pain Medicine)  CEDRIC Lazar-CNP as Nurse Practitioner (Obstetrics and Gynecology)    Patient Active Problem List   Diagnosis    Amnestic MCI (mild cognitive impairment with memory loss)    Antiphospholipid antibody syndrome (CMS/HCC)    Asthma    Sleep apnea    Bilateral knee pain    CHF (congestive heart failure) (CMS/HCC)    Stage 3 chronic kidney disease (CMS/HCC)    Chronic midline low back pain without sciatica    Cystitis cystica    Diffusion capacity of lung (dl), decreased    Dry skin dermatitis    Erythema nodosum    Fuchs' endothelial dystrophy    History of DVT (deep vein thrombosis)    HLD (hyperlipidemia)    Hypertension    Inflammatory arthritis    Lumbar radiculopathy    Microscopic hematuria    Migraine without aura and without status migrainosus, not intractable    Obesity    Osteoarthritis, multiple sites    Overactive bladder    Pancreas cyst    Post-thrombotic syndrome    Type 2 diabetes mellitus without complication, without long-term current use of insulin (CMS/HCC)    Raynaud's phenomenon without gangrene    Restless legs syndrome    Sarcoidosis    Seasonal allergic rhinitis    Small fiber neuropathy    Tubular adenoma of  colon    Vitamin D deficiency    Xerostomia    Peripheral venous insufficiency    Other chest pain    Pulmonary embolism and infarction (CMS/HCC)    Folliculitis decalvans    Contracture, left ankle    Disability of walking         Current Outpatient Medications:     albuterol 2.5 mg /3 mL (0.083 %) nebulizer solution, Take 3 mL (2.5 mg) by nebulization every 6 hours if needed., Disp: , Rfl:     albuterol 90 mcg/actuation inhaler, Inhale 2 puffs every 6 hours if needed for shortness of breath or wheezing., Disp: 18 g, Rfl: 2    amLODIPine (Norvasc) 10 mg tablet, Take 1 tablet (10 mg) by mouth once daily., Disp: 90 tablet, Rfl: 3    atorvastatin (Lipitor) 40 mg tablet, Take 1 tablet (40 mg) by mouth once daily., Disp: 90 tablet, Rfl: 3    carbidopa-levodopa (Sinemet)  mg tablet, TAKE 1 AND 1/2 TABLETS BY MOUTH DAILY, Disp: 135 tablet, Rfl: 2    colloidal oatmeaL (Eucerin Eczema Relief) 1 % cream, 4 times a day., Disp: , Rfl:     empagliflozin (Jardiance) 10 mg, Take 1 tablet (10 mg) by mouth once daily., Disp: 30 tablet, Rfl: 0    famotidine (Pepcid) 20 mg tablet, Take 1 tablet (20 mg) by mouth 2 times a day., Disp: , Rfl:     fluticasone (Flonase) 50 mcg/actuation nasal spray, SHAKE LIQUID AND USE 1 TO 2 SPRAYS IN EACH NOSTRIL DAILY AS NEEDED FOR SYMPTOMS OR ALLERGY, Disp: , Rfl:     furosemide (Lasix) 20 mg tablet, TAKE 1 TABLET BY MOUTH EVERY DAY AS NEEDED FOR LEG SWELLING, Disp: 90 tablet, Rfl: 3    Gemtesa 75 mg tablet, Take 1 tablet (75 mg) by mouth once daily., Disp: 30 tablet, Rfl: 11    Jantoven 5 mg tablet, Take by mouth., Disp: , Rfl:     nitroglycerin (Nitrostat) 0.4 mg SL tablet, Place 1 tablet (0.4 mg) under the tongue every 5 minutes if needed for chest pain., Disp: 25 tablet, Rfl: 2    oxybutynin XL (Ditropan-XL) 10 mg 24 hr tablet, Take 1 tablet (10 mg) by mouth once daily., Disp: , Rfl:     pantoprazole (ProtoNix) 40 mg EC tablet, Take 1 tablet (40 mg) by mouth once daily., Disp: , Rfl:      sildenafil (Revatio) 20 mg tablet, Take 1 tablet (20 mg) by mouth 2 times a day., Disp: , Rfl:     sodium chloride (Ayr) 0.65 % nasal drops, Administer 1 drop into each nostril 4 times a day as needed., Disp: , Rfl:     topiramate (Topamax) 25 mg tablet, Take 4 tablets (100 mg) by mouth once daily., Disp: 360 tablet, Rfl: 3    traMADol (Ultram) 50 mg tablet, Take 1 tablet (50 mg) by mouth 2 times a day., Disp: , Rfl:     valACYclovir (Valtrex) 500 mg tablet, Take 1 tablet (500 mg) by mouth once daily., Disp: 90 tablet, Rfl: 3    Past Medical, Surgical, Family, Social, and Substance Histories Reviewed.     Medicare Wellness Questionnaires and Histories in Nursing Notes Reviewed.     Review of Systems    Objective   There were no vitals taken for this visit.    Physical Exam    Assessment/Plan   Problem List Items Addressed This Visit       Amnestic MCI (mild cognitive impairment with memory loss)    Antiphospholipid antibody syndrome (CMS/HCC)    Asthma    CHF (congestive heart failure) (CMS/HCC)    HLD (hyperlipidemia)    Hypertension    Type 2 diabetes mellitus without complication, without long-term current use of insulin (CMS/HCC)    Tubular adenoma of colon    Vitamin D deficiency    Peripheral venous insufficiency    Pulmonary embolism and infarction (CMS/HCC)     Other Visit Diagnoses       Well adult exam    -  Primary    Need for vaccination        Encounter for hepatitis C screening test for low risk patient        Screening for HIV (human immunodeficiency virus)        Screen for colon cancer                  Assessment, plans, tests, and follow up discussed with patient and patient verbalized understanding. Marni was given an opportunity to ask questions and  any concerns were addressed including but not limited to ***.

## 2024-02-02 LAB — 1,25(OH)2D3 SERPL-MCNC: 64.1 PG/ML (ref 19.9–79.3)

## 2024-02-07 ENCOUNTER — ANTICOAGULATION - WARFARIN VISIT (OUTPATIENT)
Dept: CARDIOLOGY | Facility: CLINIC | Age: 64
End: 2024-02-07
Payer: COMMERCIAL

## 2024-02-07 DIAGNOSIS — I26.99 PULMONARY EMBOLISM AND INFARCTION (MULTI): Primary | ICD-10-CM

## 2024-02-07 LAB
INR IN PPP BY COAGULATION ASSAY EXTERNAL: 3.7 (ref 2–3)
PROTHROMBIN TIME (PT) IN PPP BY COAGULATION ASSAY EXTERNAL: ABNORMAL SECONDS

## 2024-02-07 NOTE — PROGRESS NOTES
Patient identification verified with 2 identifiers.    Location: [facility] Patient Self-Testing Program [phone]    Referring Physician: DR. LARISSA BEARD  Enrollment/ Re-enrollment date: 7/21/2024   INR Goal: 2.0-3.0  INR monitoring is per [facility] protocol.  Anticoagulation Medication: warfarin  Indication: Pulmonary Embolism (PE)    Subjective   Bleeding signs/symptoms: Yes Pt states she has had several nosebleeds.    Bruising: No   Major bleeding event: No  Thrombosis signs/symptoms: No  Thromboembolic event: No  Missed doses: No  Extra doses: No  Medication changes: No  Dietary changes: No  Change in health: No  Change in activity: No  Alcohol: No  Other concerns: No    Upcoming Surgeries:  Does the Patient Have any upcoming surgeries that require interruption in anticoagulation therapy? no  Does the patient require bridging? no      Anticoagulation Summary  As of 2/7/2024      INR goal:  2.0-3.0   TTR:  44.4 % (4.2 mo)   INR used for dosing:  3.70 (2/7/2024)   Weekly warfarin total:  40 mg               Assessment/Plan   Supratherapeutic     1. New dose:  Hold today's dose and decrease by approximately 10%.   Dosing schedule reviewed with pt.  Pt confirms understanding.  2. Next INR: 1 week      Education provided to patient during the visit:  Patient instructed to call in interim with questions, concerns and changes.   Patient educated on interactions between medications and warfarin.   Patient educated on dietary consistency in vitamin k consumption.   Patient educated on affects of alcohol consumption while taking warfarin.   Patient educated on signs of bleeding/clotting.   Patient educated on compliance with dosing, follow up appointments, and prescribed plan of care.

## 2024-02-14 ENCOUNTER — OFFICE VISIT (OUTPATIENT)
Dept: PRIMARY CARE | Facility: CLINIC | Age: 64
End: 2024-02-14
Payer: COMMERCIAL

## 2024-02-14 ENCOUNTER — ANTICOAGULATION - WARFARIN VISIT (OUTPATIENT)
Dept: CARDIOLOGY | Facility: CLINIC | Age: 64
End: 2024-02-14

## 2024-02-14 VITALS
OXYGEN SATURATION: 99 % | HEART RATE: 67 BPM | DIASTOLIC BLOOD PRESSURE: 68 MMHG | SYSTOLIC BLOOD PRESSURE: 118 MMHG | BODY MASS INDEX: 35.45 KG/M2 | WEIGHT: 187.6 LBS | TEMPERATURE: 96.7 F

## 2024-02-14 DIAGNOSIS — E78.2 MIXED HYPERLIPIDEMIA: ICD-10-CM

## 2024-02-14 DIAGNOSIS — I26.99 PULMONARY EMBOLISM AND INFARCTION (MULTI): Primary | ICD-10-CM

## 2024-02-14 DIAGNOSIS — Z12.31 BREAST CANCER SCREENING BY MAMMOGRAM: ICD-10-CM

## 2024-02-14 DIAGNOSIS — D68.61 ANTIPHOSPHOLIPID ANTIBODY SYNDROME (MULTI): ICD-10-CM

## 2024-02-14 DIAGNOSIS — D86.9 SARCOIDOSIS: ICD-10-CM

## 2024-02-14 DIAGNOSIS — N18.31 STAGE 3A CHRONIC KIDNEY DISEASE (MULTI): ICD-10-CM

## 2024-02-14 DIAGNOSIS — I50.22 CHRONIC SYSTOLIC CONGESTIVE HEART FAILURE (MULTI): Primary | ICD-10-CM

## 2024-02-14 DIAGNOSIS — I10 HYPERTENSION, UNSPECIFIED TYPE: ICD-10-CM

## 2024-02-14 DIAGNOSIS — Z86.711 HISTORY OF PULMONARY EMBOLUS (PE): ICD-10-CM

## 2024-02-14 DIAGNOSIS — R80.9 TYPE 2 DIABETES MELLITUS WITH MICROALBUMINURIA, WITHOUT LONG-TERM CURRENT USE OF INSULIN (MULTI): ICD-10-CM

## 2024-02-14 DIAGNOSIS — E11.29 TYPE 2 DIABETES MELLITUS WITH MICROALBUMINURIA, WITHOUT LONG-TERM CURRENT USE OF INSULIN (MULTI): ICD-10-CM

## 2024-02-14 DIAGNOSIS — G47.30 SLEEP APNEA, UNSPECIFIED TYPE: ICD-10-CM

## 2024-02-14 LAB
INR IN PPP BY COAGULATION ASSAY EXTERNAL: 3
PROTHROMBIN TIME (PT) IN PPP BY COAGULATION ASSAY EXTERNAL: NORMAL SECONDS

## 2024-02-14 PROCEDURE — 3078F DIAST BP <80 MM HG: CPT | Performed by: FAMILY MEDICINE

## 2024-02-14 PROCEDURE — 3051F HG A1C>EQUAL 7.0%<8.0%: CPT | Performed by: FAMILY MEDICINE

## 2024-02-14 PROCEDURE — 3060F POS MICROALBUMINURIA REV: CPT | Performed by: FAMILY MEDICINE

## 2024-02-14 PROCEDURE — 3049F LDL-C 100-129 MG/DL: CPT | Performed by: FAMILY MEDICINE

## 2024-02-14 PROCEDURE — 3008F BODY MASS INDEX DOCD: CPT | Performed by: FAMILY MEDICINE

## 2024-02-14 PROCEDURE — 3074F SYST BP LT 130 MM HG: CPT | Performed by: FAMILY MEDICINE

## 2024-02-14 PROCEDURE — 99214 OFFICE O/P EST MOD 30 MIN: CPT | Performed by: FAMILY MEDICINE

## 2024-02-14 RX ORDER — ROSUVASTATIN CALCIUM 40 MG/1
40 TABLET, COATED ORAL DAILY
Qty: 90 TABLET | Refills: 1 | Status: SHIPPED | OUTPATIENT
Start: 2024-02-14 | End: 2024-05-16 | Stop reason: SDUPTHER

## 2024-02-14 ASSESSMENT — ENCOUNTER SYMPTOMS
SHORTNESS OF BREATH: 0
MYALGIAS: 0
UNEXPECTED WEIGHT CHANGE: 0
DIARRHEA: 0
HEADACHES: 0
POLYDIPSIA: 0
COUGH: 0
ARTHRALGIAS: 0
BRUISES/BLEEDS EASILY: 0
POLYPHAGIA: 0
TROUBLE SWALLOWING: 0
FATIGUE: 0
APPETITE CHANGE: 0
CONSTIPATION: 0
NAUSEA: 0
PALPITATIONS: 0
DIZZINESS: 0

## 2024-02-14 NOTE — ASSESSMENT & PLAN NOTE
On Warfarin per coumadin clinic. She did not understand Eliquis and is going to talk to them about using it instead.

## 2024-02-14 NOTE — PATIENT INSTRUCTIONS
Increase Empglaflozin( Jardiance ) to 25 mg daily.     GO back and see Dr. David Hwang about your fingers and the Sildenafil.     You have protein in your urine. This can be early sign.     Change from Atorvastatin to Rosuvastatin 40 mg daily.     Recheck labs in 3 months.     Go back and see Dr. Mariam Smith about your kidneys.     Talk to Coumadin doctor about switching from Warfarin to Eliquis.     Ordered Echo which is ultrasound of your heart.     Ordered Referral to new pulmonry doctor.     Mammogram due after March 23, order in chart.

## 2024-02-14 NOTE — PROGRESS NOTES
Subjective   Patient ID: Marni Lugo is a 63 y.o. female who presents for Follow-up.    Seen in June, was to follow up in 3 months.    Atorvastatin was increase to 40 mg last visit.     DM2 - Empaglaflozin 10 mg daily added last visit. She is taking it daily. No side A1C. She is willing to increase dose. Diet not good. Exercise limited. She has highly variable intake.     CHF - was referred to cardiology. Went per patient. On Furosemide. Still has swelling legs after blood clot. Wears jocelynn hose. On Warfarin after PE. She was worried on Eliquis she would not know if blood is thin enough.     HTN - on Amlodipine 10 mg daily.  Sarcoidosis - lung doctor none since Dr. Jimenez left. She has not seen another doctor.     Dr. Riaz Ahmad re Raynauds. Started her on Sildenafil 20. She filled it but did not take it because she heard it was viagra. She was afraid it would cause her to grow extra body parts. Has not seen him since 2022. She is willing to talk to him about it.     Hair is falling out a lot. Has had it for a while. 2 months. Started after big scare when someone told her daughter she had pancreatic cancer, which she doesn't, and was very stressful.            Current Outpatient Medications:     albuterol 2.5 mg /3 mL (0.083 %) nebulizer solution, Take 3 mL (2.5 mg) by nebulization every 6 hours if needed., Disp: , Rfl:     albuterol 90 mcg/actuation inhaler, Inhale 2 puffs every 6 hours if needed for shortness of breath or wheezing., Disp: 18 g, Rfl: 2    amLODIPine (Norvasc) 10 mg tablet, Take 1 tablet (10 mg) by mouth once daily., Disp: 90 tablet, Rfl: 3    carbidopa-levodopa (Sinemet)  mg tablet, TAKE 1 AND 1/2 TABLETS BY MOUTH DAILY, Disp: 135 tablet, Rfl: 2    famotidine (Pepcid) 20 mg tablet, Take 1 tablet (20 mg) by mouth 2 times a day., Disp: , Rfl:     fluticasone (Flonase) 50 mcg/actuation nasal spray, SHAKE LIQUID AND USE 1 TO 2 SPRAYS IN EACH NOSTRIL DAILY AS NEEDED FOR SYMPTOMS OR ALLERGY, Disp: ,  Rfl:     furosemide (Lasix) 20 mg tablet, TAKE 1 TABLET BY MOUTH EVERY DAY AS NEEDED FOR LEG SWELLING, Disp: 90 tablet, Rfl: 3    Gemtesa 75 mg tablet, Take 1 tablet (75 mg) by mouth once daily., Disp: 30 tablet, Rfl: 11    Jantoven 5 mg tablet, Take by mouth., Disp: , Rfl:     nitroglycerin (Nitrostat) 0.4 mg SL tablet, Place 1 tablet (0.4 mg) under the tongue every 5 minutes if needed for chest pain., Disp: 25 tablet, Rfl: 2    pantoprazole (ProtoNix) 40 mg EC tablet, Take 1 tablet (40 mg) by mouth once daily., Disp: , Rfl:     sodium chloride (Ayr) 0.65 % nasal drops, Administer 1 drop into each nostril 4 times a day as needed., Disp: , Rfl:     topiramate (Topamax) 25 mg tablet, Take 4 tablets (100 mg) by mouth once daily., Disp: 360 tablet, Rfl: 3    traMADol (Ultram) 50 mg tablet, Take 1 tablet (50 mg) by mouth 2 times a day., Disp: , Rfl:     valACYclovir (Valtrex) 500 mg tablet, Take 1 tablet (500 mg) by mouth once daily., Disp: 90 tablet, Rfl: 3    colloidal oatmeaL (Eucerin Eczema Relief) 1 % cream, 4 times a day., Disp: , Rfl:     empagliflozin (Jardiance) 25 mg, Take 1 tablet (25 mg) by mouth once daily., Disp: 90 tablet, Rfl: 3    rosuvastatin (Crestor) 40 mg tablet, Take 1 tablet (40 mg) by mouth once daily., Disp: 90 tablet, Rfl: 1    sildenafil (Revatio) 20 mg tablet, Take 1 tablet (20 mg) by mouth 2 times a day., Disp: , Rfl:     Patient Active Problem List   Diagnosis    Amnestic MCI (mild cognitive impairment with memory loss)    Antiphospholipid antibody syndrome (CMS/HCC)    Asthma    Sleep apnea    Bilateral knee pain    CHF (congestive heart failure) (CMS/HCC)    Stage 3 chronic kidney disease (CMS/HCC)    Chronic midline low back pain without sciatica    Cystitis cystica    Diffusion capacity of lung (dl), decreased    Dry skin dermatitis    Erythema nodosum    Fuchs' endothelial dystrophy    History of DVT (deep vein thrombosis)    HLD (hyperlipidemia)    Hypertension    Inflammatory  arthritis    Lumbar radiculopathy    Microscopic hematuria    Migraine without aura and without status migrainosus, not intractable    Obesity    Osteoarthritis, multiple sites    Overactive bladder    Pancreas cyst    Post-thrombotic syndrome    Type 2 diabetes mellitus with microalbuminuria, without long-term current use of insulin (CMS/HCC)    Raynaud's phenomenon without gangrene    Restless legs syndrome    Sarcoidosis    Seasonal allergic rhinitis    Small fiber neuropathy    Tubular adenoma of colon    Vitamin D deficiency    Xerostomia    Peripheral venous insufficiency    Other chest pain    History of pulmonary embolus (PE)    Folliculitis decalvans    Contracture, left ankle    Disability of walking         Review of Systems   Constitutional:  Negative for appetite change, fatigue and unexpected weight change.   HENT:  Negative for trouble swallowing.    Eyes:  Negative for visual disturbance.   Respiratory:  Negative for cough and shortness of breath.    Cardiovascular:  Negative for chest pain, palpitations and leg swelling.   Gastrointestinal:  Negative for constipation, diarrhea and nausea.   Endocrine: Negative for cold intolerance, heat intolerance, polydipsia, polyphagia and polyuria.   Musculoskeletal:  Negative for arthralgias and myalgias.   Skin:  Negative for rash.        Toenails discolored - following up with podiatry.    Neurological:  Negative for dizziness and headaches.   Hematological:  Does not bruise/bleed easily.       Objective   /68 (BP Location: Left arm, Patient Position: Sitting, BP Cuff Size: Large adult)   Pulse 67   Temp 35.9 °C (96.7 °F) (Temporal)   Wt 85.1 kg (187 lb 9.6 oz)   SpO2 99%   BMI 35.45 kg/m²     Physical Exam    Assessment/Plan   Problem List Items Addressed This Visit       Antiphospholipid antibody syndrome (CMS/HCC)     On Anticoagulation - managed by Cardiology.          Sleep apnea     Pulmonology was managing and not seen anyone since   oTny. Referral to pulmonology.          Relevant Orders    Referral to Pulmonology    CHF (congestive heart failure) (CMS/[facility]) - Primary     Not had echo in 7 yrs. Repeat done. Feeling well. Follow up in 3 months.          Relevant Medications    empagliflozin (Jardiance) 25 mg    Other Relevant Orders    Transthoracic Echo Complete    Stage 3 chronic kidney disease (CMS/[facility])     Reviewed labs with patient. Increased Alb/creat to 168.9. Discussed role of BP control, lipids and glucose control. Changed statin. BP at goal, is on Amlodipine. A1C up to 7. Recheck 3 months. She has nephrologist but has not scheduled with her. Will do so.          Relevant Orders    Comprehensive Metabolic Panel    Albumin , Urine Random    HLD (hyperlipidemia)     LDL above goal on Atorvastatin 40 mg daily.     Change to Rosuvastatin. Recheck 3 months.          Relevant Medications    rosuvastatin (Crestor) 40 mg tablet    Other Relevant Orders    Comprehensive Metabolic Panel    Lipid Panel    Hypertension     BP in control. Tolerating Amlodipine well.         Type 2 diabetes mellitus with microalbuminuria, without long-term current use of insulin (CMS/[facility])     A1C up to 7.0. She has CKD and microalbuminuria as well as CHF so will increase Jardiance. She was started on Empaglaflozin due to CHF. Up to date on eye visit. Checks feet. Going to see podiatry about nails.          Relevant Medications    empagliflozin (Jardiance) 25 mg    Other Relevant Orders    Comprehensive Metabolic Panel    Hemoglobin A1C    Albumin , Urine Random    Sarcoidosis     Dr. Jimenez left. Needs another pulmonologist. She was referred to Pulmonology.          Relevant Orders    Referral to Pulmonology    History of pulmonary embolus (PE)     On Warfarin per coumadin clinic. She did not understand Eliquis and is going to talk to them about using it instead.          Relevant Orders    Referral to Pulmonology     Other Visit Diagnoses       Breast cancer  screening by mammogram        Relevant Orders    BI mammo bilateral screening tomosynthesis              Assessment, plans, tests, and follow up discussed with patient and patient verbalized understanding. Marni was given an opportunity to ask questions and  any concerns were addressed including but not limited to medication, labs, orders, follow up.

## 2024-02-14 NOTE — ASSESSMENT & PLAN NOTE
Reviewed labs with patient. Increased Alb/creat to 168.9. Discussed role of BP control, lipids and glucose control. Changed statin. BP at goal, is on Amlodipine. A1C up to 7. Recheck 3 months. She has nephrologist but has not scheduled with her. Will do so.

## 2024-02-14 NOTE — PROGRESS NOTES
Patient identification verified with 2 identifiers.    Location: [facility] Patient Self-Testing Program [phone]    Referring Physician: DR LARISSA BEARD  Enrollment/ Re-enrollment date: 7/21/24   INR Goal: 2.0-3.0  INR monitoring is per [facility] protocol.  Anticoagulation Medication: warfarin  Indication: Pulmonary Embolism (PE)    Subjective   Bleeding signs/symptoms: No    Bruising: No   Major bleeding event: No  Thrombosis signs/symptoms: No  Thromboembolic event: No  Missed doses: No  Extra doses: No  Medication changes: No  Dietary changes: No  Change in health: No  Change in activity: No  Alcohol: No  Other concerns: No    Upcoming Procedures:  Does the Patient Have any upcoming procedures that require interruption in anticoagulation therapy? no  Does the patient require bridging? no      Anticoagulation Summary  As of 2/14/2024      INR goal:  2.0-3.0   TTR:  42.0 % (4.4 mo)   INR used for dosing:  3.00 (2/14/2024)   Weekly warfarin total:  40 mg               Assessment/Plan   Therapeutic     1. New dose: no change    2. Next INR: 1 week      Education provided to patient during the visit:  Patient instructed to call in interim with questions, concerns and changes.

## 2024-02-14 NOTE — ASSESSMENT & PLAN NOTE
A1C up to 7.0. She has CKD and microalbuminuria as well as CHF so will increase Jardiance. She was started on Empaglaflozin due to CHF. Up to date on eye visit. Checks feet. Going to see podiatry about nails.

## 2024-02-21 ENCOUNTER — ANTICOAGULATION - WARFARIN VISIT (OUTPATIENT)
Dept: CARDIOLOGY | Facility: CLINIC | Age: 64
End: 2024-02-21
Payer: COMMERCIAL

## 2024-02-21 DIAGNOSIS — Z86.711 HISTORY OF PULMONARY EMBOLUS (PE): ICD-10-CM

## 2024-02-21 NOTE — PROGRESS NOTES
Patient identification verified with 2 identifiers.    Location: [facility] Patient Self-Testing Program [phone]    Referring Physician: DR LARISSA BEARD  Enrollment/ Re-enrollment date: 7/21/24   INR Goal: 2.0-3.0  INR monitoring is per [facility] protocol.  Anticoagulation Medication: warfarin  Indication: Pulmonary Embolism (PE)    Subjective   Bleeding signs/symptoms: No    Bruising: No   Major bleeding event: No  Thrombosis signs/symptoms: No  Thromboembolic event: No  Missed doses: No  Extra doses: No  Medication changes: Yes  Dietary changes: No  Change in health: No  Change in activity: No  Alcohol: No  Other concerns: No    Upcoming Procedures:  Does the Patient Have any upcoming procedures that require interruption in anticoagulation therapy? no  Does the patient require bridging? no      Anticoagulation Summary  As of 2/21/2024      INR goal:  2.0-3.0   TTR:  39.9 % (4.6 mo)   INR used for dosing:  3.70 (2/21/2024)   Weekly warfarin total:  35 mg               Assessment/Plan   Supratherapeutic     1. New dose:  Will hold today's dose and decrease TWD by approximately 10% per protocol.  Pt states understanding.     2. Next INR: 1 week      Education provided to patient during the visit:  Patient instructed to call in interim with questions, concerns and changes.   Patient educated on interactions between medications and warfarin.   Patient educated on dietary consistency in vitamin k consumption.   Patient educated on signs of bleeding/clotting.   Patient educated on compliance with dosing, follow up appointments, and prescribed plan of care.

## 2024-02-28 ENCOUNTER — ANTICOAGULATION - WARFARIN VISIT (OUTPATIENT)
Dept: CARDIOLOGY | Facility: CLINIC | Age: 64
End: 2024-02-28
Payer: COMMERCIAL

## 2024-02-28 DIAGNOSIS — Z86.711 HISTORY OF PULMONARY EMBOLUS (PE): Primary | ICD-10-CM

## 2024-02-28 NOTE — PROGRESS NOTES
Patient identification verified with 2 identifiers.    Location: [facility] Patient Self-Testing Program [phone]     Referring Physician: DR LARISSA BEARD  Enrollment/ Re-enrollment date: 24   INR Goal: 2.0-3.0  INR monitoring is per [facility] protocol.  Anticoagulation Medication: warfarin  Indication: Pulmonary Embolism (PE)    Subjective   Bleeding signs/symptoms: No    Bruising: No   Major bleeding event: No  Thrombosis signs/symptoms: No  Thromboembolic event: No  Missed doses: Yes  pt states in addition to holding one dose last week for INR of 3.7, she missed Monday and Tuesday's doses.  Extra doses: No  Medication changes: No  Dietary changes: No  Change in health: No  Change in activity: No  Alcohol: No  Other concerns: No    Upcoming Procedures:  Does the Patient Have any upcoming procedures that require interruption in anticoagulation therapy? no  Does the patient require bridging? no      Anticoagulation Summary  As of 2024      INR goal:  2.0-3.0   TTR:  40.3 % (4.9 mo)   INR used for dosin.60 (2024)   Weekly warfarin total:  37.5 mg               Assessment/Plan   Subtherapeutic     1. New dose: increase weekly dose    2. Next INR: 1 week      Education provided to patient during the visit:  Patient instructed to call in interim with questions, concerns and changes.   Patient educated on interactions between medications and warfarin.   Patient educated on dietary consistency in vitamin k consumption.   Patient educated on compliance with dosing, follow up appointments, and prescribed plan of care.

## 2024-03-01 ENCOUNTER — HOSPITAL ENCOUNTER (OUTPATIENT)
Dept: CARDIOLOGY | Facility: CLINIC | Age: 64
Discharge: HOME | End: 2024-03-01
Payer: COMMERCIAL

## 2024-03-01 DIAGNOSIS — I50.22 CHRONIC SYSTOLIC CONGESTIVE HEART FAILURE (MULTI): Primary | ICD-10-CM

## 2024-03-01 DIAGNOSIS — I50.22 CHRONIC SYSTOLIC CONGESTIVE HEART FAILURE (MULTI): ICD-10-CM

## 2024-03-01 PROCEDURE — 93306 TTE W/DOPPLER COMPLETE: CPT

## 2024-03-01 PROCEDURE — 93306 TTE W/DOPPLER COMPLETE: CPT | Performed by: INTERNAL MEDICINE

## 2024-03-02 LAB
AORTIC VALVE MEAN GRADIENT: 4.1 MMHG
AORTIC VALVE PEAK VELOCITY: 1.36 M/S
AV PEAK GRADIENT: 7.4 MMHG
AVA (PEAK VEL): 2.16 CM2
AVA (VTI): 2.4 CM2
EJECTION FRACTION APICAL 4 CHAMBER: 60.6
EJECTION FRACTION: 69 %
LEFT ATRIUM VOLUME AREA LENGTH INDEX BSA: 21.4 ML/M2
LEFT VENTRICLE INTERNAL DIMENSION DIASTOLE: 4.21 CM (ref 3.5–6)
LEFT VENTRICULAR OUTFLOW TRACT DIAMETER: 1.92 CM
MITRAL VALVE E/A RATIO: 0.63
MITRAL VALVE E/E' RATIO: 8.35
RIGHT VENTRICLE FREE WALL PEAK S': 13.76 CM/S
TRICUSPID ANNULAR PLANE SYSTOLIC EXCURSION: 2.3 CM

## 2024-03-06 ENCOUNTER — ANTICOAGULATION - WARFARIN VISIT (OUTPATIENT)
Dept: CARDIOLOGY | Facility: CLINIC | Age: 64
End: 2024-03-06
Payer: COMMERCIAL

## 2024-03-06 DIAGNOSIS — Z86.711 HISTORY OF PULMONARY EMBOLUS (PE): Primary | ICD-10-CM

## 2024-03-06 LAB
INR IN PPP BY COAGULATION ASSAY EXTERNAL: 2
PROTHROMBIN TIME (PT) IN PPP BY COAGULATION ASSAY EXTERNAL: NORMAL SECONDS

## 2024-03-06 NOTE — PROGRESS NOTES
Patient identification verified with 2 identifiers.    Location: [facility] Patient Self-Testing Program [phone]     Referring Physician: DR LARISSA BEARD  Enrollment/ Re-enrollment date: 24   INR Goal: 2.0-3.0  INR monitoring is per [facility] protocol.  Anticoagulation Medication: warfarin  Indication: Pulmonary Embolism (PE)    Subjective   Bleeding signs/symptoms: No    Bruising: No   Major bleeding event: No  Thrombosis signs/symptoms: No  Thromboembolic event: No  Missed doses: No  Extra doses: No  Medication changes: No  Dietary changes: No  Change in health: No  Change in activity: No  Alcohol: No  Other concerns: No    Upcoming Procedures:  Does the Patient Have any upcoming procedures that require interruption in anticoagulation therapy? no  Does the patient require bridging? no      Anticoagulation Summary  As of 3/6/2024      INR goal:  2.0-3.0   TTR:  38.4 % (5.1 mo)   INR used for dosin.00 (3/6/2024)   Weekly warfarin total:  37.5 mg             Received faxed INR self-test results and called patient. Pt identification verified with 2 pt identifiers. Current dose schedule reviewed with patient, patient verbalized understanding. Pt instructed to call in interim with questions, concerns or changes.  RUDDY Canrey RN     Assessment/Plan   Therapeutic     1. New dose: no change    2. Next INR: 1 week      Education provided to patient during the visit:  Patient instructed to call in interim with questions, concerns and changes.   Patient educated on compliance with dosing, follow up appointments, and prescribed plan of care.

## 2024-03-13 ENCOUNTER — ANTICOAGULATION - WARFARIN VISIT (OUTPATIENT)
Dept: CARDIOLOGY | Facility: CLINIC | Age: 64
End: 2024-03-13
Payer: COMMERCIAL

## 2024-03-13 DIAGNOSIS — Z86.711 HISTORY OF PULMONARY EMBOLUS (PE): Primary | ICD-10-CM

## 2024-03-13 LAB
INR IN PPP BY COAGULATION ASSAY EXTERNAL: 3.7
PROTHROMBIN TIME (PT) IN PPP BY COAGULATION ASSAY EXTERNAL: NORMAL SECONDS

## 2024-03-13 NOTE — PROGRESS NOTES
Patient identification verified with 2 identifiers.    Location: [facility] Patient Self-Testing Program [phone]    Referring Physician: DR. BEARD  Enrollment/ Re-enrollment date: 7/21/24   INR Goal: 2.0-3.0  INR monitoring is per [facility] protocol.  Anticoagulation Medication: warfarin  Indication: Pulmonary Embolism (PE)    Subjective   Bleeding signs/symptoms: No    Bruising: No   Major bleeding event: No  Thrombosis signs/symptoms: No  Thromboembolic event: No  Missed doses: No  Extra doses: No  Medication changes: No  Dietary changes: Yes  PT STATES SHE HAS NOT EATEN  HER NORMAL AMOUNT OF GREEN VEGETABLES.  SHE PREFERS NOT TO EAT AS MANY GREEN VEGETABLES.  Change in health: No  Change in activity: No  Alcohol: No  Other concerns: No PT STATES SHE IS LOSING HER COVERAGE AND MAY NOT BE ABLE TO COME TO [facility].  SHE IS SEEKING ADVICE FROM DR. BEARD REGARDING ALTERNATE MED.     Upcoming Procedures:  Does the Patient Have any upcoming procedures that require interruption in anticoagulation therapy? no  Does the patient require bridging? no      Anticoagulation Summary  As of 3/13/2024      INR goal:  2.0-3.0   TTR:  39.3 % (5.3 mo)   INR used for dosing:  3.70 (3/13/2024)   Weekly warfarin total:  35 mg               Assessment/Plan   Supratherapeutic     1. New dose: I SPOKE TO PT CONFIRMING CURRENT WARFARIN DOSING.   PT WILL HOLD TODAY'S DOSE OF WARFARIN AND DECREASE WEEKLY DOSE SCHEDULE.  PT VERBALIZED UNDERSTANDING OF THESE DOSING INSTRUCTIONS.      2. Next INR: 1 week      Education provided to patient during the visit:  Patient instructed to call in interim with questions, concerns and changes.   Patient educated on dietary consistency in vitamin k consumption.

## 2024-03-18 ENCOUNTER — OFFICE VISIT (OUTPATIENT)
Dept: PULMONOLOGY | Facility: CLINIC | Age: 64
End: 2024-03-18
Payer: COMMERCIAL

## 2024-03-18 VITALS
DIASTOLIC BLOOD PRESSURE: 64 MMHG | TEMPERATURE: 96.3 F | SYSTOLIC BLOOD PRESSURE: 118 MMHG | HEART RATE: 64 BPM | RESPIRATION RATE: 18 BRPM | OXYGEN SATURATION: 99 % | WEIGHT: 181 LBS | HEIGHT: 61 IN | BODY MASS INDEX: 34.17 KG/M2

## 2024-03-18 DIAGNOSIS — J45.909 ASTHMA, UNSPECIFIED ASTHMA SEVERITY, UNSPECIFIED WHETHER COMPLICATED, UNSPECIFIED WHETHER PERSISTENT (HHS-HCC): ICD-10-CM

## 2024-03-18 DIAGNOSIS — D86.9 SARCOIDOSIS: Primary | ICD-10-CM

## 2024-03-18 PROCEDURE — 3078F DIAST BP <80 MM HG: CPT | Performed by: STUDENT IN AN ORGANIZED HEALTH CARE EDUCATION/TRAINING PROGRAM

## 2024-03-18 PROCEDURE — 1036F TOBACCO NON-USER: CPT | Performed by: STUDENT IN AN ORGANIZED HEALTH CARE EDUCATION/TRAINING PROGRAM

## 2024-03-18 PROCEDURE — 3049F LDL-C 100-129 MG/DL: CPT | Performed by: STUDENT IN AN ORGANIZED HEALTH CARE EDUCATION/TRAINING PROGRAM

## 2024-03-18 PROCEDURE — 3051F HG A1C>EQUAL 7.0%<8.0%: CPT | Performed by: STUDENT IN AN ORGANIZED HEALTH CARE EDUCATION/TRAINING PROGRAM

## 2024-03-18 PROCEDURE — 3060F POS MICROALBUMINURIA REV: CPT | Performed by: STUDENT IN AN ORGANIZED HEALTH CARE EDUCATION/TRAINING PROGRAM

## 2024-03-18 PROCEDURE — 99214 OFFICE O/P EST MOD 30 MIN: CPT | Performed by: STUDENT IN AN ORGANIZED HEALTH CARE EDUCATION/TRAINING PROGRAM

## 2024-03-18 PROCEDURE — 3074F SYST BP LT 130 MM HG: CPT | Performed by: STUDENT IN AN ORGANIZED HEALTH CARE EDUCATION/TRAINING PROGRAM

## 2024-03-18 PROCEDURE — 3008F BODY MASS INDEX DOCD: CPT | Performed by: STUDENT IN AN ORGANIZED HEALTH CARE EDUCATION/TRAINING PROGRAM

## 2024-03-18 PROCEDURE — 99204 OFFICE O/P NEW MOD 45 MIN: CPT | Performed by: STUDENT IN AN ORGANIZED HEALTH CARE EDUCATION/TRAINING PROGRAM

## 2024-03-18 ASSESSMENT — ASTHMA QUESTIONNAIRES
QUESTION_1 LAST FOUR WEEKS HOW MUCH OF THE TIME DID YOUR ASTHMA KEEP YOU FROM GETTING AS MUCH DONE AT WORK, SCHOOL OR AT HOME: NONE OF THE TIME
ACT_TOTALSCORE: 23
QUESTION_3 LAST FOUR WEEKS HOW OFTEN DID YOUR ASTHMA SYMPTOMS (WHEEZING, COUGHING, SHORTNESS OF BREATH, CHEST TIGHTNESS OR PAIN) WAKE YOU UP AT NIGHT OR EARLIER THAN USUAL IN THE MORNING: NOT AT ALL
QUESTION_4 LAST FOUR WEEKS HOW OFTEN HAVE YOU USED YOUR RESCUE INHALER OR NEBULIZER MEDICATION (SUCH AS ALBUTEROL): NOT AT ALL
QUESTION_2 LAST FOUR WEEKS HOW OFTEN HAVE YOU HAD SHORTNESS OF BREATH: 1 OR 2 TIMES PER WEEK
QUESTION_5 LAST FOUR WEEKS HOW WOULD YOU RATE YOUR ASTHMA CONTROL: WELL CONTROLLED

## 2024-03-18 ASSESSMENT — PAIN SCALES - GENERAL: PAINLEVEL: 0-NO PAIN

## 2024-03-18 NOTE — PATIENT INSTRUCTIONS
Thank you for visiting the Pulmonary Clinic today.   Your breathing medications: albuterol as needed   Tests: yearly blood work to check liver function, yearly ekg  Referrals: yearly eye exam  Return in one year or sooner if needed   If you have questions or concerns, call [phone] (option 4)

## 2024-03-18 NOTE — PROGRESS NOTES
Department of Medicine I Division of Pulmonary, Critical Care, and Sleep Medicine   [id number] [place] [address]  Phone: [phone]  Fax: [phone]    History of Present Illness   Marni Lugo is a 63 y.o. female presenting for asthma and sarcoidosis    Referred by:  Gunjan Salvador, *, for asthma. I have independently interviewed and examined the patient in the office and reviewed available records.     Previous patient of Dr. Jimenez --last saw in March 2023     Diagnosed with sarcoidosis  via biopsy many years ago --had been on prednisone after the diagnosis ( not sure which site was biopsy), was also on methotrexate but it was stopped due to elevated liver enzymes    Does get dyspnea --not every day  Does help with her daughter with the grandkids --activity can wear her out sometimes   Does get dyspnea going up the stairs  History of blood clots on blood thinner   Denies any cough   Denies wheezing   Denies any pneumonias in the last year   Is on sildenafil for Raynaud's   Does see an ophthalmologist--is dealing with cataracts and torn retina   Pulmonary medications: albuterol-rarely needs to use   Review of Systems  Review of Systems   Constitutional:  Negative for chills, fever and unexpected weight change.   Respiratory:          As per hpi   Cardiovascular:  Negative for chest pain, palpitations and leg swelling.   Gastrointestinal:  Negative for abdominal distention and abdominal pain.   Musculoskeletal:  Negative for arthralgias and gait problem.   Skin:  Negative for rash.     All other review of systems are negative and/or non-contributory.    Past Medical History   She has a past medical history of Abnormal finding of blood chemistry, unspecified (04/14/2014), Acute bronchospasm (04/18/2016), Acute candidiasis of vulva and vagina (07/21/2013), Acute embolism and thrombosis of unspecified vein (02/18/2014), Acute vaginitis (02/11/2016), Adjustment disorder with  mixed anxiety and depressed mood (04/06/2023), Age-related nuclear cataract, bilateral (10/18/2022), Anesthesia of skin (03/16/2018), Bitten or stung by nonvenomous insect and other nonvenomous arthropods, initial encounter (09/01/2016), Carpal tunnel syndrome (04/06/2023), Cervical lymphadenitis (04/06/2023), Chronic pain of left ankle (04/06/2023), Diverticulitis of large intestine without perforation or abscess without bleeding (07/21/2013), Dry eye syndrome of unspecified lacrimal gland (10/18/2022), Encounter for follow-up examination after completed treatment for conditions other than malignant neoplasm (11/18/2015), Encounter for immunization (02/20/2016), Encounter for other preprocedural examination (10/09/2015), Encounter for screening for infections with a predominantly sexual mode of transmission (03/16/2018), Encounter for screening for infections with a predominantly sexual mode of transmission (02/28/2018), Encounter for screening for infections with a predominantly sexual mode of transmission (02/28/2018), Encounter for screening for malignant neoplasm of colon (11/23/2020), Excessive and frequent menstruation with irregular cycle (07/21/2013), Facial cellulitis (05/17/2023), Follicular disorder, unspecified (04/02/2021), Hemorrhage of anus and rectum (05/09/2014), Hot flashes, menopausal (04/06/2023), Hyperlipidemia, unspecified (07/21/2013), Impetigo, unspecified (04/02/2021), Incarcerated umbilical hernia (04/06/2023), Inflamed seborrheic keratosis (04/02/2021), Lateral epicondylitis, unspecified elbow (03/16/2018), Lateral epicondylitis, unspecified elbow (02/28/2018), Lateral epicondylitis, unspecified elbow (02/28/2018), Left sided colitis without complications (CMS/HCC) (07/21/2013), Lumbosacral spondylosis (04/06/2023), Myopia, bilateral (10/18/2022), Nevus of left conjunctiva (04/06/2023), Obstructive sleep apnea (adult) (pediatric) (07/21/2013), Other chest pain (05/08/2019), Other  conditions influencing health status (12/28/2020), Other conditions influencing health status (05/08/2019), Other enthesopathies, not elsewhere classified (05/06/2014), Other hypersomnia (11/08/2017), 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), Other specified abnormal findings of blood chemistry (10/07/2016), Other symptoms and signs involving the genitourinary system (11/23/2020), Other symptoms and signs involving the nervous system (04/13/2017), Personal history of diseases of the blood and blood-forming organs and certain disorders involving the immune mechanism (10/18/2022), Personal history of diseases of the skin and subcutaneous tissue, Personal history of other diseases of the circulatory system (07/10/2020), Personal history of other diseases of the female genital tract (10/26/2020), Personal history of other diseases of the musculoskeletal system and connective tissue (01/16/2018), Personal history of other diseases of the respiratory system (07/21/2013), Personal history of other diseases of the respiratory system (04/14/2014), Personal history of other diseases of urinary system (02/02/2022), Personal history of other diseases of urinary system (11/01/2021), Personal history of other infectious and parasitic diseases (04/14/2014), Personal history of other infectious and parasitic diseases, Personal history of other specified conditions (07/21/2020), Personal history of other specified conditions (01/18/2017), Personal history of other specified conditions (11/23/2020), Personal history of other specified conditions (07/14/2022), Personal history of other specified conditions (10/14/2020), Personal history of other specified conditions (06/18/2016), Personal history of other specified conditions (07/26/2016), Personal history of other specified conditions (06/18/2016), Personal history of peptic ulcer disease, Personal history of pulmonary embolism (08/21/2020), Personal history of urinary  (tract) infections (11/05/2015), Piriformis syndrome of right side (04/06/2023), Postnasal drip (04/14/2014), Primary osteoarthritis of first carpometacarpal joint of left hand (04/06/2023), Rash and other nonspecific skin eruption (07/14/2022), Right upper quadrant abdominal tenderness (05/22/2017), Spondylolisthesis, acquired (04/06/2023), Type 2 diabetes mellitus (CMS/HCC) (11/21/2022), Type 2 diabetes mellitus without complication, without long-term current use of insulin (CMS/[facility]) (04/06/2023), Ulcerative blepharitis unspecified eye, unspecified eyelid (09/21/2018), Ulnar neuropathy at elbow of right upper extremity (04/06/2023), Unspecified exophthalmos (07/21/2013), Unspecified exophthalmos (10/18/2022), Urinary tract infection, site not specified (04/14/2014), and Vascular disorder of intestine, unspecified (CMS/HCC) (07/21/2013).      Immunizations     Immunization History   Administered Date(s) Administered    Moderna SARS-CoV-2 Vaccination 11/16/2021, 12/14/2021    Pneumococcal conjugate vaccine, 13-valent (PREVNAR 13) 02/19/2016    Pneumococcal polysaccharide vaccine, 23-valent, age 2 years and older (PNEUMOVAX 23) 10/17/2006    Tetanus toxoid, adsorbed 04/17/1998       Medications and Allergies     Current Outpatient Medications   Medication Instructions    albuterol 90 mcg/actuation inhaler 2 puffs, inhalation, Every 6 hours PRN    albuterol 2.5 mg, nebulization, Every 6 hours PRN    amLODIPine (NORVASC) 10 mg, oral, Daily    carbidopa-levodopa (Sinemet)  mg tablet 1.5 tablets, oral, Daily, Take 1 and 1/2 (one and one-half) tablets by mouth daily.    colloidal oatmeaL (Eucerin Eczema Relief) 1 % cream 4 times daily    empagliflozin (JARDIANCE) 25 mg, oral, Daily    famotidine (PEPCID) 20 mg, oral, 2 times daily    fluticasone (Flonase) 50 mcg/actuation nasal spray SHAKE LIQUID AND USE 1 TO 2 SPRAYS IN EACH NOSTRIL DAILY AS NEEDED FOR SYMPTOMS OR ALLERGY    furosemide (Lasix) 20 mg tablet TAKE 1  "TABLET BY MOUTH EVERY DAY AS NEEDED FOR LEG SWELLING    Gemtesa 75 mg, oral, Daily    Jantoven 5 mg tablet oral    nitroglycerin (NITROSTAT) 0.4 mg, sublingual, Every 5 min PRN    pantoprazole (PROTONIX) 40 mg, oral, Daily    rosuvastatin (CRESTOR) 40 mg, oral, Daily    sildenafil (Revatio) 20 mg tablet 1 tablet, oral, 2 times daily    sodium chloride (Ayr) 0.65 % nasal drops 1 drop, Each Nostril, 4 times daily PRN    topiramate (TOPAMAX) 100 mg, oral, Daily    traMADol (Ultram) 50 mg tablet 1 tablet, oral, 2 times daily    valACYclovir (VALTREX) 500 mg, oral, Daily      Adhesive and Latex    Social History   She reports that she quit smoking about 30 years ago. Her smoking use included cigarettes. She started smoking about 34 years ago. She has a 1.00 pack-year smoking history. She has never used smokeless tobacco. She reports current alcohol use. She reports that she does not use drugs.    Smoking History: quit smoking 1994   Exposure/Job History: denies, no pets currently     Family History     Family History   Problem Relation Name Age of Onset    Heart disease Mother      Kidney failure Father      Cirrhosis Sister         Cancer-- in aunt (unsure what type of cancer)     Surgical History   She has a past surgical history that includes Total vaginal hysterectomy (09/11/2014); Hysteroscopy (05/09/2014); Other surgical history (04/07/2014); Tonsillectomy (04/07/2014); Tubal ligation (04/07/2014); CT angio abdomen pelvis w and or wo IV IV contrast (11/12/2015); CT angio abdomen pelvis w and or wo IV IV contrast (5/8/2022); and CT angio abdomen pelvis w and or wo IV IV contrast (4/1/2023).    Physical Exam   /64 (BP Location: Right arm, Patient Position: Sitting)   Pulse 64   Temp 35.7 °C (96.3 °F) (Temporal)   Resp 18   Ht 1.549 m (5' 1\")   Wt 82.1 kg (181 lb)   SpO2 99%   BMI 34.20 kg/m²      Physical Exam  Vitals reviewed.   Constitutional:       General: She is awake.   Cardiovascular:      Rate " and Rhythm: Normal rate and regular rhythm.   Pulmonary:      Effort: Pulmonary effort is normal.      Breath sounds: Normal breath sounds.   Neurological:      Mental Status: She is alert and oriented to person, place, and time.   Psychiatric:         Attention and Perception: Attention and perception normal.         Behavior: Behavior normal.          Results   Pulmonary Function Tests:  2023   FEV1/FVC: 83 no BD response Fev1: 126% pred   T% pred   DLCO 70% pred       Chest Radiograph:  XR chest 2 view 2023    Narrative  Interpreted By:  OG PACHECO MD  MRN: [id number]  Patient Name: YUMIKO ADAMES    STUDY:  Chest dated  2023.    INDICATION:  Asthma  J45.909: Asthma    COMPARISON:  Chest dated 2022.    ACCESSION NUMBER(S):  [id number]    ORDERING CLINICIAN:  JEN DAN    TECHNIQUE:  PA and lateral radiograph of the chest.    FINDINGS:  The lungs are clear.  No pneumothorax or effusion is evident. The  cardiomediastinal silhouette is  not enlarged.Degenerative change is  seen of the spine and shoulders.    Impression  No acute cardiopulmonary process is evident.      Chest CT Scan:  2022  FINDINGS:  MEDIASTINUM AND LYMPH NODES: No enlarged intrathoracic or axillary  lymph nodes.  No pneumomediastinum.     VESSELS:  Normal caliber aorta without dissection. No significant  aortic atherosclerosis.  No pulmonary embolism through the proximal  segmental level beyond which always is limited.     HEART: Normal in size.  No coronary artery calcifications. No  significant pericardial effusion.     LUNG, AIRWAYS, AND PLEURA: Mild bibasilar atelectasis. No  consolidation, pulmonary edema, pleural effusion or pneumothorax.     OSSEOUS STRUCTURES: No acute osseous abnormality.     CHEST WALL SOFT TISSUES: No discernible abnormality.     UPPER ABDOMEN/OTHER: Please see separate report.     IMPRESSION:  No acute pulmonary embolism through the proximal segmental beyond  which evaluation is limited  "due to inadequate pulmonary artery  opacification..       ECHO:  No results found for this or any previous visit from the past 365 days.       Labs:  Lab Results   Component Value Date    WBC 5.3 05/08/2023    HGB 11.5 (L) 05/08/2023    HCT 34.5 (L) 05/08/2023    MCV 84 05/08/2023     05/08/2023       Lab Results   Component Value Date    CREATININE 1.06 (H) 01/31/2024    BUN 12 01/31/2024     01/31/2024    K 4.0 01/31/2024     (H) 01/31/2024    CO2 23 01/31/2024        Lab Results   Component Value Date    MIAN NEGATIVE 05/10/2022    RF 13 08/27/2019    SEDRATE 57 (H) 05/06/2023        IgE: No results found for: \"IGE\"   Respiratory Allergy Panel:  AEC:   Eosinophils Absolute (x10E9/L)   Date Value   05/06/2023 0.06     Eosinophils % (%)   Date Value   05/06/2023 0.5       Cultures:  No results found for: \"AFBCX\"   No results found for: \"RESPCULTCYFI\"    No results found for the last 90 days.  C     Assessment and Plan       Sarcoidosis --stable from a pulmonary standpoint   Asthma --no BD response on recent PFTs     Recommendations  -yearly lfts, kidney function (done with primary)   -yearly EKG (follows with cardiology)   -yearly eye exam (follows with ophthalmology)   -no need for escalation of therapy at this time   -continue albuterol prn           Follow-up:  One year or sooner if needed     Magalie Fernández MD  03/18/2024    "

## 2024-03-19 ENCOUNTER — APPOINTMENT (OUTPATIENT)
Dept: RADIOLOGY | Facility: HOSPITAL | Age: 64
End: 2024-03-19
Payer: COMMERCIAL

## 2024-03-19 ENCOUNTER — HOSPITAL ENCOUNTER (EMERGENCY)
Facility: HOSPITAL | Age: 64
Discharge: HOME | End: 2024-03-19
Attending: STUDENT IN AN ORGANIZED HEALTH CARE EDUCATION/TRAINING PROGRAM
Payer: COMMERCIAL

## 2024-03-19 VITALS
SYSTOLIC BLOOD PRESSURE: 132 MMHG | DIASTOLIC BLOOD PRESSURE: 74 MMHG | WEIGHT: 178 LBS | HEIGHT: 61 IN | HEART RATE: 79 BPM | OXYGEN SATURATION: 100 % | RESPIRATION RATE: 17 BRPM | BODY MASS INDEX: 33.61 KG/M2 | TEMPERATURE: 98.6 F

## 2024-03-19 DIAGNOSIS — M79.661 PAIN AND SWELLING OF LOWER LEG, RIGHT: Primary | ICD-10-CM

## 2024-03-19 DIAGNOSIS — M79.661 RIGHT CALF PAIN: ICD-10-CM

## 2024-03-19 DIAGNOSIS — M79.89 PAIN AND SWELLING OF LOWER LEG, RIGHT: Primary | ICD-10-CM

## 2024-03-19 DIAGNOSIS — I15.9 SECONDARY HYPERTENSION: ICD-10-CM

## 2024-03-19 PROCEDURE — 93971 EXTREMITY STUDY: CPT | Performed by: RADIOLOGY

## 2024-03-19 PROCEDURE — 99284 EMERGENCY DEPT VISIT MOD MDM: CPT | Mod: 25

## 2024-03-19 PROCEDURE — 93971 EXTREMITY STUDY: CPT

## 2024-03-19 ASSESSMENT — PAIN DESCRIPTION - ORIENTATION: ORIENTATION: RIGHT

## 2024-03-19 ASSESSMENT — ENCOUNTER SYMPTOMS
ARTHRALGIAS: 0
CHILLS: 0
ABDOMINAL DISTENTION: 0
UNEXPECTED WEIGHT CHANGE: 0
ABDOMINAL PAIN: 0
PALPITATIONS: 0
FEVER: 0

## 2024-03-19 ASSESSMENT — PAIN DESCRIPTION - LOCATION: LOCATION: LEG

## 2024-03-19 ASSESSMENT — PAIN DESCRIPTION - PAIN TYPE: TYPE: ACUTE PAIN

## 2024-03-19 ASSESSMENT — PAIN - FUNCTIONAL ASSESSMENT: PAIN_FUNCTIONAL_ASSESSMENT: 0-10

## 2024-03-19 ASSESSMENT — PAIN SCALES - GENERAL: PAINLEVEL_OUTOF10: 8

## 2024-03-19 NOTE — ED TRIAGE NOTES
The patient was seen and examined in triage.    History of Present Illness: The patient is a 63-year-old female past medical history of deep vein thrombosis and pulmonary embolism on warfarin.  Has not missed any doses has noticed more pain and discomfort in the right calf and swelling.  She denies any fevers chills chest pain shortness of breath she has no trauma or injury to report patient has been taking all of her medication prescribed she was concerned about a deep vein thrombosis.    Brief Physical Exam:   Exam is limited by the patient sitting in a chair in triage.   Heart: Regular rate and rhythm.   Lungs: Clear to auscultation bilaterally.   Abdomen: Soft, nondistended, normoactive bowel sounds, nontender   Extremities: 2+ peripheral pulses.  Trace edema in right lower extremity.  No edema in the left lower extremity    Plan: Appropriate labs and diagnostic imaging were ordered.      For the remainder of the patient's workup and ED course, please refer to the main ED provider note. We discussed need for diagnostic testing including laboratory studies and imaging.  We also discussed that patient may be asked to wait in the waiting room while these tests are pending.  They understand that if they choose to leave without having the testing completed or resulted that we cannot rule out acute life threatening illnesses and the risks involved could lead to worsening condition, permanent disability or even death.     Disclaimer: This note was dictated by speech recognition. Minor errors in transcription may be present. Please call if questions.

## 2024-03-19 NOTE — ED TRIAGE NOTES
Pt presents to ED for RLE pain x3 days with hx of DVT and PE and taking blood thinners. Denies CP/SOB, N/V/D. Pt reports minimal abd pain.

## 2024-03-20 ENCOUNTER — APPOINTMENT (OUTPATIENT)
Dept: PULMONOLOGY | Facility: CLINIC | Age: 64
End: 2024-03-20
Payer: COMMERCIAL

## 2024-03-20 ENCOUNTER — ANTICOAGULATION - WARFARIN VISIT (OUTPATIENT)
Dept: CARDIOLOGY | Facility: CLINIC | Age: 64
End: 2024-03-20
Payer: COMMERCIAL

## 2024-03-20 DIAGNOSIS — Z86.711 HISTORY OF PULMONARY EMBOLUS (PE): Primary | ICD-10-CM

## 2024-03-20 LAB
INR IN PPP BY COAGULATION ASSAY EXTERNAL: 3.4
PROTHROMBIN TIME (PT) IN PPP BY COAGULATION ASSAY EXTERNAL: NORMAL SECONDS

## 2024-03-20 NOTE — ED PROVIDER NOTES
HPI   Chief Complaint   Patient presents with    Leg Pain       63-year-old female past medical history of deep vein thrombosis on warfarin presented to the emergency department for right calf discomfort.  Patient reports no fevers chills chest pain shortness of breath abdominal pain or any other muscle aches and pains.  Patient denies any numbness weakness tingling.  She has no knee pain to report.  She was concerned about having a deep vein thrombosis and noticed some mild swelling to the right leg as well.  Patient denies any other symptoms at this time and has been taking all her medication as prescribed.                          No data recorded                   Patient History   Past Medical History:   Diagnosis Date    Abnormal finding of blood chemistry, unspecified 04/14/2014    Abnormal blood chemistry    Acute bronchospasm 04/18/2016    Bronchospasm    Acute candidiasis of vulva and vagina 07/21/2013    Vaginitis due to Candida albicans    Acute embolism and thrombosis of unspecified vein 02/18/2014    Venous thrombosis    Acute vaginitis 02/11/2016    Bacterial vaginosis    Adjustment disorder with mixed anxiety and depressed mood 04/06/2023    Age-related nuclear cataract, bilateral 10/18/2022    Nuclear sclerosis of both eyes    Anesthesia of skin 03/16/2018    Numbness and tingling in both hands    Bitten or stung by nonvenomous insect and other nonvenomous arthropods, initial encounter 09/01/2016    Bug bite, initial encounter    Carpal tunnel syndrome 04/06/2023    Cervical lymphadenitis 04/06/2023    Chronic pain of left ankle 04/06/2023    Diverticulitis of large intestine without perforation or abscess without bleeding 07/21/2013    Diverticulitis of colon    Dry eye syndrome of unspecified lacrimal gland 10/18/2022    Dry eye syndrome    Encounter for follow-up examination after completed treatment for conditions other than malignant neoplasm 11/18/2015    Hospital discharge follow-up     Encounter for immunization 02/20/2016    Immunization due    Encounter for other preprocedural examination 10/09/2015    Pre-operative clearance    Encounter for screening for infections with a predominantly sexual mode of transmission 03/16/2018    Screening for STD (sexually transmitted disease)    Encounter for screening for infections with a predominantly sexual mode of transmission 02/28/2018    Screening for STD (sexually transmitted disease)    Encounter for screening for infections with a predominantly sexual mode of transmission 02/28/2018    Screening for STD (sexually transmitted disease)    Encounter for screening for malignant neoplasm of colon 11/23/2020    Encounter for screening colonoscopy    Excessive and frequent menstruation with irregular cycle 07/21/2013    Metrorrhagia    Facial cellulitis 05/17/2023    Follicular disorder, unspecified 04/02/2021    Hemorrhage of anus and rectum 05/09/2014    Rectal hemorrhage    Hot flashes, menopausal 04/06/2023    Hyperlipidemia, unspecified 07/21/2013    Hyperlipidemia    Impetigo, unspecified 04/02/2021    Incarcerated umbilical hernia 04/06/2023    Surgery 4/1/23. Patient doing well. Dr. Tawanda Munoz.     Inflamed seborrheic keratosis 04/02/2021    Lateral epicondylitis, unspecified elbow 03/16/2018    Tennis elbow    Lateral epicondylitis, unspecified elbow 02/28/2018    Tennis elbow    Lateral epicondylitis, unspecified elbow 02/28/2018    Tennis elbow    Left sided colitis without complications (CMS/[facility]) 07/21/2013    Ulcerative colitis, left sided    Lumbosacral spondylosis 04/06/2023    Myopia, bilateral 10/18/2022    Myopia of both eyes with astigmatism and presbyopia    Nevus of left conjunctiva 04/06/2023    Obstructive sleep apnea (adult) (pediatric) 07/21/2013    Obstructive sleep apnea    Other chest pain 05/08/2019    Chest tightness    Other conditions influencing health status 12/28/2020    Colonoscopy planned    Other conditions  influencing health status 05/08/2019    History of cough    Other enthesopathies, not elsewhere classified 05/06/2014    Tendinitis of right shoulder    Other hypersomnia 11/08/2017    Excessive daytime sleepiness    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    Protein S deficiency    Other specified abnormal findings of blood chemistry 10/07/2016    Elevated serum creatinine    Other symptoms and signs involving the genitourinary system 11/23/2020    Abnormal urogenital discharge    Other symptoms and signs involving the nervous system 04/13/2017    Suspected sleep apnea    Personal history of diseases of the blood and blood-forming organs and certain disorders involving the immune mechanism 10/18/2022    History of sarcoidosis    Personal history of diseases of the skin and subcutaneous tissue     History of dermatitis    Personal history of other diseases of the circulatory system 07/10/2020    History of congestive heart failure    Personal history of other diseases of the female genital tract 10/26/2020    History of abnormal uterine bleeding    Personal history of other diseases of the musculoskeletal system and connective tissue 01/16/2018    History of lateral epicondylitis    Personal history of other diseases of the respiratory system 07/21/2013    History of acute bronchitis    Personal history of other diseases of the respiratory system 04/14/2014    Personal history of sinusitis    Personal history of other diseases of urinary system 02/02/2022    History of hematuria    Personal history of other diseases of urinary system 11/01/2021    History of hematuria    Personal history of other infectious and parasitic diseases 04/14/2014    History of trichomoniasis    Personal history of other infectious and parasitic diseases     History of herpes simplex infection    Personal history of other specified conditions 07/21/2020    History of chest pain    Personal history of other specified conditions  01/18/2017    History of dysuria    Personal history of other specified conditions 11/23/2020    History of diarrhea    Personal history of other specified conditions 07/14/2022    History of itching    Personal history of other specified conditions 10/14/2020    History of dyspnea    Personal history of other specified conditions 06/18/2016    History of excessive sweating    Personal history of other specified conditions 07/26/2016    History of diarrhea    Personal history of other specified conditions 06/18/2016    History of nausea    Personal history of peptic ulcer disease     History of peptic ulcer    Personal history of pulmonary embolism 08/21/2020    History of pulmonary embolism    Personal history of urinary (tract) infections 11/05/2015    History of urinary tract infection    Piriformis syndrome of right side 04/06/2023    Postnasal drip 04/14/2014    Post-nasal drainage    Primary osteoarthritis of first carpometacarpal joint of left hand 04/06/2023    Rash and other nonspecific skin eruption 07/14/2022    Rash    Right upper quadrant abdominal tenderness 05/22/2017    Abdominal tenderness, right upper quadrant    Spondylolisthesis, acquired 04/06/2023    Type 2 diabetes mellitus (CMS/[facility]) 11/21/2022    Formatting of this note might be different from the original. 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    Ulcerative blepharitis    Ulnar neuropathy at elbow of right upper extremity 04/06/2023    Unspecified exophthalmos 07/21/2013    Exophthalmos    Unspecified exophthalmos 10/18/2022    Ocular proptosis    Urinary tract infection, site not specified 04/14/2014    Acute UTI    Vascular disorder of intestine, unspecified (CMS/[facility]) 07/21/2013    Ischemic  colitis     Past Surgical History:   Procedure Laterality Date    CT ABDOMEN PELVIS ANGIOGRAM W AND/OR WO IV CONTRAST  2015    CT ABDOMEN PELVIS ANGIOGRAM W AND/OR WO IV CONTRAST 2015 [facility]GAC[facility]    CT ABDOMEN PELVIS ANGIOGRAM W AND/OR WO IV CONTRAST  2022    CT ABDOMEN PELVIS ANGIOGRAM W AND/OR WO IV CONTRAST 2022 DOCTOR OFFICE LEGACY    CT ABDOMEN PELVIS ANGIOGRAM W AND/OR WO IV CONTRAST  2023    CT ABDOMEN PELVIS ANGIOGRAM W AND/OR WO IV CONTRAST U CT    HYSTEROSCOPY  2014    Hysteroscopy With Endometrial Ablation    OTHER SURGICAL HISTORY  2014    Left Hemicolectomy    TONSILLECTOMY  2014    Tonsillectomy    TOTAL VAGINAL HYSTERECTOMY  2014    Vaginal Hysterectomy    TUBAL LIGATION  2014    Tubal Ligation     Family History   Problem Relation Name Age of Onset    Heart disease Mother      Kidney failure Father      Cirrhosis Sister       Social History     Tobacco Use    Smoking status: Former     Packs/day: 0.25     Years: 4.00     Additional pack years: 0.00     Total pack years: 1.00     Types: Cigarettes     Start date:      Quit date:      Years since quittin.2    Smokeless tobacco: Never   Vaping Use    Vaping Use: Never used   Substance Use Topics    Alcohol use: Yes     Comment: wine every now and then    Drug use: Never       Physical Exam   ED Triage Vitals [24]   Temperature Heart Rate Respirations BP   37 °C (98.6 °F) 79 17 132/74      Pulse Ox Temp Source Heart Rate Source Patient Position   100 % Oral -- --      BP Location FiO2 (%)     -- --       Physical Exam  Vitals reviewed.   Constitutional:       Appearance: Normal appearance.   HENT:      Head: Normocephalic and atraumatic.      Nose: Nose normal.      Mouth/Throat:      Mouth: Mucous membranes are moist.   Eyes:      Extraocular Movements: Extraocular movements intact.      Conjunctiva/sclera: Conjunctivae normal.   Cardiovascular:      Rate and  Rhythm: Normal rate and regular rhythm.      Pulses: Normal pulses.           Radial pulses are 2+ on the right side and 2+ on the left side.        Dorsalis pedis pulses are 2+ on the right side and 2+ on the left side.        Posterior tibial pulses are 2+ on the right side and 2+ on the left side.      Heart sounds: Normal heart sounds.   Pulmonary:      Effort: Pulmonary effort is normal.      Breath sounds: Normal breath sounds.   Abdominal:      General: Abdomen is flat. Bowel sounds are normal.      Palpations: Abdomen is soft.   Musculoskeletal:         General: Normal range of motion.      Right lower le+ Edema present.      Left lower leg: No edema.   Skin:     General: Skin is warm and dry.      Capillary Refill: Capillary refill takes less than 2 seconds.   Neurological:      Mental Status: She is alert and oriented to person, place, and time. Mental status is at baseline.      Cranial Nerves: Cranial nerves 2-12 are intact. No cranial nerve deficit.      Sensory: Sensation is intact. No sensory deficit.      Motor: Motor function is intact. No weakness.   Psychiatric:         Mood and Affect: Mood normal.         ED Course & MDM   ED Course as of 24   Tue Mar 19, 2024   1953 63-year-old female history of deep vein thrombosis presents emergency department for right calf pain.  On examination vital signs are stable 2+ peripheral pulses abdomen nontender.  Patient has regular rate and rhythm no murmurs appreciated abdomen nontender differential diagnosis includes deep vein thrombosis there is some trace pitting edema in right lower extremity.  Patient has been compliant with all of her medications.  DVT will be ruled out with ultrasound of the right lower extremity.  Differential diagnosis considered however unlikely is compartment syndrome in this patient has soft compartments.  Differential considered however unlikely is osseous abnormality as patient has no point tenderness overlying the  right lower extremity in the bony prominences and she has 2+ peripheral pulses. [ZS]   2112 DVT ultrasound shows no evidence of deep vein thrombosis.  Patient will be discharged she feels better at this time. [ZS]      ED Course User Index  [ZS] Anibal Luciano MD         Diagnoses as of 03/19/24 2114   Right calf pain   Secondary hypertension       Medical Decision Making      Procedure  Procedures     Anibal Luciano MD  03/19/24 2115

## 2024-03-20 NOTE — DISCHARGE INSTRUCTIONS
You are found to have elevated blood pressure here in the emergency department I recommend that you follow-up with your primary care doctor soon as possible to have your blood pressure reevaluated and see if you require either a change in medication or be started on medication.       You require repeat ultrasound within 5 days.  Follow-up with your primary care physician to have this performed to return to the ER.  If you have worsening pain numbness weakness tingling chest pain shortness of breath abdominal pain or any new/worsening/concerning symptoms return to the emergency department urgently by calling 911.

## 2024-03-21 NOTE — PROGRESS NOTES
Patient identification verified with 2 identifiers.    Location: [facility] Patient Self-Testing Program [phone]    Referring Physician: Adamaris Nguyen MD  Enrollment/ Re-enrollment date: 7/21/2024   INR Goal: 2.0-3.0  INR monitoring is per [facility] protocol.  Anticoagulation Medication: warfarin  Indication: Pulmonary Embolism (PE)    Subjective   Bleeding signs/symptoms: No    Bruising: No   Major bleeding event: No  Thrombosis signs/symptoms: No  Thromboembolic event: No  Missed doses: No  Extra doses: No  Medication changes: No  Dietary changes: No  Change in health: No  Change in activity: No  Alcohol: No  Other concerns: No    Upcoming Procedures:  Does the Patient Have any upcoming procedures that require interruption in anticoagulation therapy? no  Does the patient require bridging? no      Anticoagulation Summary  As of 3/20/2024      INR goal:  2.0-3.0   TTR:  37.7 % (5.6 mo)   INR used for dosing:  3.40 (3/20/2024)   Weekly warfarin total:  32.5 mg               Assessment/Plan   Supratherapeutic     1. New dose:  dose reduced      2. Next INR: 1 week      Education provided to patient during the visit:  Patient instructed to call in interim with questions, concerns and changes.         PRINCIPAL DISCHARGE DIAGNOSIS  Diagnosis: Postoperative state  Assessment and Plan of Treatment: Postoperative state

## 2024-03-24 ENCOUNTER — APPOINTMENT (OUTPATIENT)
Dept: CARDIOLOGY | Facility: HOSPITAL | Age: 64
End: 2024-03-24
Payer: COMMERCIAL

## 2024-03-24 ENCOUNTER — HOSPITAL ENCOUNTER (EMERGENCY)
Facility: HOSPITAL | Age: 64
Discharge: HOME | End: 2024-03-24
Attending: EMERGENCY MEDICINE
Payer: COMMERCIAL

## 2024-03-24 ENCOUNTER — APPOINTMENT (OUTPATIENT)
Dept: RADIOLOGY | Facility: HOSPITAL | Age: 64
End: 2024-03-24
Payer: COMMERCIAL

## 2024-03-24 VITALS
SYSTOLIC BLOOD PRESSURE: 138 MMHG | HEART RATE: 77 BPM | WEIGHT: 179 LBS | DIASTOLIC BLOOD PRESSURE: 73 MMHG | BODY MASS INDEX: 33.79 KG/M2 | OXYGEN SATURATION: 99 % | HEIGHT: 61 IN | RESPIRATION RATE: 16 BRPM | TEMPERATURE: 97.9 F

## 2024-03-24 DIAGNOSIS — M79.661 RIGHT CALF PAIN: Primary | ICD-10-CM

## 2024-03-24 DIAGNOSIS — R07.9 CHEST PAIN, UNSPECIFIED TYPE: ICD-10-CM

## 2024-03-24 LAB
ALBUMIN SERPL BCP-MCNC: 4.4 G/DL (ref 3.4–5)
ALP SERPL-CCNC: 117 U/L (ref 33–136)
ALT SERPL W P-5'-P-CCNC: 14 U/L (ref 7–45)
ANION GAP SERPL CALC-SCNC: 12 MMOL/L (ref 10–20)
AST SERPL W P-5'-P-CCNC: 18 U/L (ref 9–39)
BASOPHILS # BLD AUTO: 0.02 X10*3/UL (ref 0–0.1)
BASOPHILS NFR BLD AUTO: 0.3 %
BILIRUB SERPL-MCNC: 0.6 MG/DL (ref 0–1.2)
BNP SERPL-MCNC: 33 PG/ML (ref 0–99)
BUN SERPL-MCNC: 10 MG/DL (ref 6–23)
CALCIUM SERPL-MCNC: 9.2 MG/DL (ref 8.6–10.3)
CARDIAC TROPONIN I PNL SERPL HS: 5 NG/L (ref 0–13)
CARDIAC TROPONIN I PNL SERPL HS: 6 NG/L (ref 0–13)
CHLORIDE SERPL-SCNC: 112 MMOL/L (ref 98–107)
CO2 SERPL-SCNC: 25 MMOL/L (ref 21–32)
CREAT SERPL-MCNC: 1.09 MG/DL (ref 0.5–1.05)
EGFRCR SERPLBLD CKD-EPI 2021: 58 ML/MIN/1.73M*2
EOSINOPHIL # BLD AUTO: 0.03 X10*3/UL (ref 0–0.7)
EOSINOPHIL NFR BLD AUTO: 0.5 %
ERYTHROCYTE [DISTWIDTH] IN BLOOD BY AUTOMATED COUNT: 16.1 % (ref 11.5–14.5)
GLUCOSE SERPL-MCNC: 101 MG/DL (ref 74–99)
HCT VFR BLD AUTO: 45.6 % (ref 36–46)
HGB BLD-MCNC: 15 G/DL (ref 12–16)
IMM GRANULOCYTES # BLD AUTO: 0 X10*3/UL (ref 0–0.7)
IMM GRANULOCYTES NFR BLD AUTO: 0 % (ref 0–0.9)
INR PPP: 3.4 (ref 0.9–1.1)
LYMPHOCYTES # BLD AUTO: 3.73 X10*3/UL (ref 1.2–4.8)
LYMPHOCYTES NFR BLD AUTO: 63 %
MAGNESIUM SERPL-MCNC: 2.2 MG/DL (ref 1.6–2.4)
MCH RBC QN AUTO: 28.7 PG (ref 26–34)
MCHC RBC AUTO-ENTMCNC: 32.9 G/DL (ref 32–36)
MCV RBC AUTO: 87 FL (ref 80–100)
MONOCYTES # BLD AUTO: 0.55 X10*3/UL (ref 0.1–1)
MONOCYTES NFR BLD AUTO: 9.3 %
NEUTROPHILS # BLD AUTO: 1.59 X10*3/UL (ref 1.2–7.7)
NEUTROPHILS NFR BLD AUTO: 26.9 %
NRBC BLD-RTO: 0 /100 WBCS (ref 0–0)
PLATELET # BLD AUTO: 325 X10*3/UL (ref 150–450)
POTASSIUM SERPL-SCNC: 3.7 MMOL/L (ref 3.5–5.3)
PROT SERPL-MCNC: 7.2 G/DL (ref 6.4–8.2)
PROTHROMBIN TIME: 38.8 SECONDS (ref 9.8–12.8)
RBC # BLD AUTO: 5.23 X10*6/UL (ref 4–5.2)
SODIUM SERPL-SCNC: 145 MMOL/L (ref 136–145)
WBC # BLD AUTO: 5.9 X10*3/UL (ref 4.4–11.3)

## 2024-03-24 PROCEDURE — 93005 ELECTROCARDIOGRAM TRACING: CPT | Mod: 59

## 2024-03-24 PROCEDURE — 84484 ASSAY OF TROPONIN QUANT: CPT

## 2024-03-24 PROCEDURE — 85610 PROTHROMBIN TIME: CPT

## 2024-03-24 PROCEDURE — 93005 ELECTROCARDIOGRAM TRACING: CPT

## 2024-03-24 PROCEDURE — 71046 X-RAY EXAM CHEST 2 VIEWS: CPT | Performed by: RADIOLOGY

## 2024-03-24 PROCEDURE — 36415 COLL VENOUS BLD VENIPUNCTURE: CPT

## 2024-03-24 PROCEDURE — 99285 EMERGENCY DEPT VISIT HI MDM: CPT | Mod: 25

## 2024-03-24 PROCEDURE — 73590 X-RAY EXAM OF LOWER LEG: CPT | Mod: RIGHT SIDE | Performed by: RADIOLOGY

## 2024-03-24 PROCEDURE — 83735 ASSAY OF MAGNESIUM: CPT

## 2024-03-24 PROCEDURE — 83880 ASSAY OF NATRIURETIC PEPTIDE: CPT

## 2024-03-24 PROCEDURE — 93971 EXTREMITY STUDY: CPT | Performed by: RADIOLOGY

## 2024-03-24 PROCEDURE — 71046 X-RAY EXAM CHEST 2 VIEWS: CPT

## 2024-03-24 PROCEDURE — 93971 EXTREMITY STUDY: CPT

## 2024-03-24 PROCEDURE — 85025 COMPLETE CBC W/AUTO DIFF WBC: CPT

## 2024-03-24 PROCEDURE — 73590 X-RAY EXAM OF LOWER LEG: CPT | Mod: RT

## 2024-03-24 PROCEDURE — 80053 COMPREHEN METABOLIC PANEL: CPT

## 2024-03-24 ASSESSMENT — PAIN DESCRIPTION - PAIN TYPE: TYPE: ACUTE PAIN

## 2024-03-24 ASSESSMENT — PAIN DESCRIPTION - LOCATION: LOCATION: LEG

## 2024-03-24 ASSESSMENT — PAIN SCALES - GENERAL: PAINLEVEL_OUTOF10: 7

## 2024-03-24 ASSESSMENT — PAIN - FUNCTIONAL ASSESSMENT: PAIN_FUNCTIONAL_ASSESSMENT: 0-10

## 2024-03-24 NOTE — Clinical Note
Marni Lugo was seen and treated in our emergency department on 3/24/2024.  She may return to work on 03/27/2024.       If you have any questions or concerns, please don't hesitate to call.      Anna Mcclellan PA-C

## 2024-03-24 NOTE — ED PROVIDER NOTES
HPI   Chief Complaint   Patient presents with    Leg Pain     RLE    Chest Pain       HPI  HISTORY OF PRESENT ILLNESS:  63 y.o. female presenting to the ED with complaint of right calf pain and swelling for the past 1 week.  Patient states that she was seen in the ED about 5 days ago, had a normal right lower extremity DVT ultrasound negative for DVT, and was told that if her pain is not improved she should return for a repeat ultrasound.  She reports pain at the posterior right calf, states it has been swelling up intermittently.  States that she has been sitting with her legs elevated so it is not currently as swollen as it has been right now, but feels that her ankle is swollen.  Pain is described as tightness or aching.  She denies numbness, tingling, or weakness to the extremity.  No rashes, warmth, or redness.  No difficulty ambulating.  No pain into the knee, thigh, foot, or ankle.  No known injury or trauma.  Patient also states that she had an episode of chest pain yesterday.  She describes left-sided aching chest pain that lasted about 2 to 3 hours and spontaneously resolved.  Not exertional, not worsened with walking distances or climbing stairs.  Not pleuritic, no pain with deep breaths.  No shortness of breath.  Has a history of a PE and this chest pain did not feel like her prior pain that she had when she was diagnosed with a PE.  No cough or hemoptysis.  No dizziness or lightheadedness, no syncope.  No nausea or vomiting, no diaphoresis.  No urinary symptoms.  No neck or back pain.  No fevers or chills.  No other complaints or symptoms voiced.    12 point review of systems was performed and is negative unless otherwise specified in HPI.    PMH: DVT/PE on warfarin, antiphospholipid antibody syndrome, CHF, CKD, HTN, HLD, obesity, peripheral venous insufficiency,   Family history: noncontributory  Social history: non smoker, no ETOH, no illicit substances  Past Medical History:   Diagnosis Date     Abnormal finding of blood chemistry, unspecified 04/14/2014    Abnormal blood chemistry    Acute bronchospasm 04/18/2016    Bronchospasm    Acute candidiasis of vulva and vagina 07/21/2013    Vaginitis due to Candida albicans    Acute embolism and thrombosis of unspecified vein 02/18/2014    Venous thrombosis    Acute vaginitis 02/11/2016    Bacterial vaginosis    Adjustment disorder with mixed anxiety and depressed mood 04/06/2023    Age-related nuclear cataract, bilateral 10/18/2022    Nuclear sclerosis of both eyes    Anesthesia of skin 03/16/2018    Numbness and tingling in both hands    Bitten or stung by nonvenomous insect and other nonvenomous arthropods, initial encounter 09/01/2016    Bug bite, initial encounter    Carpal tunnel syndrome 04/06/2023    Cervical lymphadenitis 04/06/2023    Chronic pain of left ankle 04/06/2023    Diverticulitis of large intestine without perforation or abscess without bleeding 07/21/2013    Diverticulitis of colon    Dry eye syndrome of unspecified lacrimal gland 10/18/2022    Dry eye syndrome    Encounter for follow-up examination after completed treatment for conditions other than malignant neoplasm 11/18/2015    Hospital discharge follow-up    Encounter for immunization 02/20/2016    Immunization due    Encounter for other preprocedural examination 10/09/2015    Pre-operative clearance    Encounter for screening for infections with a predominantly sexual mode of transmission 03/16/2018    Screening for STD (sexually transmitted disease)    Encounter for screening for infections with a predominantly sexual mode of transmission 02/28/2018    Screening for STD (sexually transmitted disease)    Encounter for screening for infections with a predominantly sexual mode of transmission 02/28/2018    Screening for STD (sexually transmitted disease)    Encounter for screening for malignant neoplasm of colon 11/23/2020    Encounter for screening colonoscopy    Excessive and frequent  menstruation with irregular cycle 07/21/2013    Metrorrhagia    Facial cellulitis 05/17/2023    Follicular disorder, unspecified 04/02/2021    Hemorrhage of anus and rectum 05/09/2014    Rectal hemorrhage    Hot flashes, menopausal 04/06/2023    Hyperlipidemia, unspecified 07/21/2013    Hyperlipidemia    Impetigo, unspecified 04/02/2021    Incarcerated umbilical hernia 04/06/2023    Surgery 4/1/23. Patient doing well. Dr. Tawanda Munoz.     Inflamed seborrheic keratosis 04/02/2021    Lateral epicondylitis, unspecified elbow 03/16/2018    Tennis elbow    Lateral epicondylitis, unspecified elbow 02/28/2018    Tennis elbow    Lateral epicondylitis, unspecified elbow 02/28/2018    Tennis elbow    Left sided colitis without complications (CMS/[facility]) 07/21/2013    Ulcerative colitis, left sided    Lumbosacral spondylosis 04/06/2023    Myopia, bilateral 10/18/2022    Myopia of both eyes with astigmatism and presbyopia    Nevus of left conjunctiva 04/06/2023    Obstructive sleep apnea (adult) (pediatric) 07/21/2013    Obstructive sleep apnea    Other chest pain 05/08/2019    Chest tightness    Other conditions influencing health status 12/28/2020    Colonoscopy planned    Other conditions influencing health status 05/08/2019    History of cough    Other enthesopathies, not elsewhere classified 05/06/2014    Tendinitis of right shoulder    Other hypersomnia 11/08/2017    Excessive daytime sleepiness    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    Protein S deficiency    Other specified abnormal findings of blood chemistry 10/07/2016    Elevated serum creatinine    Other symptoms and signs involving the genitourinary system 11/23/2020    Abnormal urogenital discharge    Other symptoms and signs involving the nervous system 04/13/2017    Suspected sleep apnea    Personal history of diseases of the blood and blood-forming organs and certain disorders involving the immune mechanism 10/18/2022    History of sarcoidosis     Personal history of diseases of the skin and subcutaneous tissue     History of dermatitis    Personal history of other diseases of the circulatory system 07/10/2020    History of congestive heart failure    Personal history of other diseases of the female genital tract 10/26/2020    History of abnormal uterine bleeding    Personal history of other diseases of the musculoskeletal system and connective tissue 01/16/2018    History of lateral epicondylitis    Personal history of other diseases of the respiratory system 07/21/2013    History of acute bronchitis    Personal history of other diseases of the respiratory system 04/14/2014    Personal history of sinusitis    Personal history of other diseases of urinary system 02/02/2022    History of hematuria    Personal history of other diseases of urinary system 11/01/2021    History of hematuria    Personal history of other infectious and parasitic diseases 04/14/2014    History of trichomoniasis    Personal history of other infectious and parasitic diseases     History of herpes simplex infection    Personal history of other specified conditions 07/21/2020    History of chest pain    Personal history of other specified conditions 01/18/2017    History of dysuria    Personal history of other specified conditions 11/23/2020    History of diarrhea    Personal history of other specified conditions 07/14/2022    History of itching    Personal history of other specified conditions 10/14/2020    History of dyspnea    Personal history of other specified conditions 06/18/2016    History of excessive sweating    Personal history of other specified conditions 07/26/2016    History of diarrhea    Personal history of other specified conditions 06/18/2016    History of nausea    Personal history of peptic ulcer disease     History of peptic ulcer    Personal history of pulmonary embolism 08/21/2020    History of pulmonary embolism    Personal history of urinary (tract) infections  11/05/2015    History of urinary tract infection    Piriformis syndrome of right side 04/06/2023    Postnasal drip 04/14/2014    Post-nasal drainage    Primary osteoarthritis of first carpometacarpal joint of left hand 04/06/2023    Rash and other nonspecific skin eruption 07/14/2022    Rash    Right upper quadrant abdominal tenderness 05/22/2017    Abdominal tenderness, right upper quadrant    Spondylolisthesis, acquired 04/06/2023    Type 2 diabetes mellitus (CMS/[facility]) 11/21/2022    Formatting of this note might be different from the original. 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    Ulcerative blepharitis    Ulnar neuropathy at elbow of right upper extremity 04/06/2023    Unspecified exophthalmos 07/21/2013    Exophthalmos    Unspecified exophthalmos 10/18/2022    Ocular proptosis    Urinary tract infection, site not specified 04/14/2014    Acute UTI    Vascular disorder of intestine, unspecified (CMS/[facility]) 07/21/2013    Ischemic colitis         Abnormal Labs Reviewed   CBC WITH AUTO DIFFERENTIAL - Abnormal; Notable for the following components:       Result Value    RBC 5.23 (*)     RDW 16.1 (*)     All other components within normal limits   COMPREHENSIVE METABOLIC PANEL - Abnormal; Notable for the following components:    Glucose 101 (*)     Chloride 112 (*)     Creatinine 1.09 (*)     eGFR 58 (*)     All other components within normal limits   PROTIME-INR - Abnormal; Notable for the following components:    Protime 38.8 (*)     INR 3.4 (*)     All other components within normal limits      Lower extremity venous duplex right   Final Result   Negative study.  No deep venous thrombosis of the  right lower   extremity.        MACRO:   None        Signed by: Roman  Schoenberger 3/24/2024 2:44 PM   Dictation workstation:   [id number]      XR chest 2 views   Final Result   No active cardiopulmonary disease.             MACRO:   None        Signed by: Marycruz Christian 3/24/2024 1:32 PM   Dictation workstation:   [id number]      XR tibia fibula right 2 views   Final Result   Mild degenerative changes in the knee, otherwise unremarkable exam of   the tibia and fibula.             MACRO:   None        Signed by: Marycruz Christian 3/24/2024 1:31 PM   Dictation workstation:   [id number]                  No data recorded                   Patient History   Past Medical History:   Diagnosis Date    Abnormal finding of blood chemistry, unspecified 04/14/2014    Abnormal blood chemistry    Acute bronchospasm 04/18/2016    Bronchospasm    Acute candidiasis of vulva and vagina 07/21/2013    Vaginitis due to Candida albicans    Acute embolism and thrombosis of unspecified vein 02/18/2014    Venous thrombosis    Acute vaginitis 02/11/2016    Bacterial vaginosis    Adjustment disorder with mixed anxiety and depressed mood 04/06/2023    Age-related nuclear cataract, bilateral 10/18/2022    Nuclear sclerosis of both eyes    Anesthesia of skin 03/16/2018    Numbness and tingling in both hands    Bitten or stung by nonvenomous insect and other nonvenomous arthropods, initial encounter 09/01/2016    Bug bite, initial encounter    Carpal tunnel syndrome 04/06/2023    Cervical lymphadenitis 04/06/2023    Chronic pain of left ankle 04/06/2023    Diverticulitis of large intestine without perforation or abscess without bleeding 07/21/2013    Diverticulitis of colon    Dry eye syndrome of unspecified lacrimal gland 10/18/2022    Dry eye syndrome    Encounter for follow-up examination after completed treatment for conditions other than malignant neoplasm 11/18/2015    Hospital discharge follow-up    Encounter for immunization 02/20/2016    Immunization due    Encounter for other preprocedural examination  10/09/2015    Pre-operative clearance    Encounter for screening for infections with a predominantly sexual mode of transmission 03/16/2018    Screening for STD (sexually transmitted disease)    Encounter for screening for infections with a predominantly sexual mode of transmission 02/28/2018    Screening for STD (sexually transmitted disease)    Encounter for screening for infections with a predominantly sexual mode of transmission 02/28/2018    Screening for STD (sexually transmitted disease)    Encounter for screening for malignant neoplasm of colon 11/23/2020    Encounter for screening colonoscopy    Excessive and frequent menstruation with irregular cycle 07/21/2013    Metrorrhagia    Facial cellulitis 05/17/2023    Follicular disorder, unspecified 04/02/2021    Hemorrhage of anus and rectum 05/09/2014    Rectal hemorrhage    Hot flashes, menopausal 04/06/2023    Hyperlipidemia, unspecified 07/21/2013    Hyperlipidemia    Impetigo, unspecified 04/02/2021    Incarcerated umbilical hernia 04/06/2023    Surgery 4/1/23. Patient doing well. Dr. Tawanda Munoz.     Inflamed seborrheic keratosis 04/02/2021    Lateral epicondylitis, unspecified elbow 03/16/2018    Tennis elbow    Lateral epicondylitis, unspecified elbow 02/28/2018    Tennis elbow    Lateral epicondylitis, unspecified elbow 02/28/2018    Tennis elbow    Left sided colitis without complications (CMS/[facility]) 07/21/2013    Ulcerative colitis, left sided    Lumbosacral spondylosis 04/06/2023    Myopia, bilateral 10/18/2022    Myopia of both eyes with astigmatism and presbyopia    Nevus of left conjunctiva 04/06/2023    Obstructive sleep apnea (adult) (pediatric) 07/21/2013    Obstructive sleep apnea    Other chest pain 05/08/2019    Chest tightness    Other conditions influencing health status 12/28/2020    Colonoscopy planned    Other conditions influencing health status 05/08/2019    History of cough    Other enthesopathies, not elsewhere classified  05/06/2014    Tendinitis of right shoulder    Other hypersomnia 11/08/2017    Excessive daytime sleepiness    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    Protein S deficiency    Other specified abnormal findings of blood chemistry 10/07/2016    Elevated serum creatinine    Other symptoms and signs involving the genitourinary system 11/23/2020    Abnormal urogenital discharge    Other symptoms and signs involving the nervous system 04/13/2017    Suspected sleep apnea    Personal history of diseases of the blood and blood-forming organs and certain disorders involving the immune mechanism 10/18/2022    History of sarcoidosis    Personal history of diseases of the skin and subcutaneous tissue     History of dermatitis    Personal history of other diseases of the circulatory system 07/10/2020    History of congestive heart failure    Personal history of other diseases of the female genital tract 10/26/2020    History of abnormal uterine bleeding    Personal history of other diseases of the musculoskeletal system and connective tissue 01/16/2018    History of lateral epicondylitis    Personal history of other diseases of the respiratory system 07/21/2013    History of acute bronchitis    Personal history of other diseases of the respiratory system 04/14/2014    Personal history of sinusitis    Personal history of other diseases of urinary system 02/02/2022    History of hematuria    Personal history of other diseases of urinary system 11/01/2021    History of hematuria    Personal history of other infectious and parasitic diseases 04/14/2014    History of trichomoniasis    Personal history of other infectious and parasitic diseases     History of herpes simplex infection    Personal history of other specified conditions 07/21/2020    History of chest pain    Personal history of other specified conditions 01/18/2017    History of dysuria    Personal history of other specified conditions 11/23/2020    History of  diarrhea    Personal history of other specified conditions 07/14/2022    History of itching    Personal history of other specified conditions 10/14/2020    History of dyspnea    Personal history of other specified conditions 06/18/2016    History of excessive sweating    Personal history of other specified conditions 07/26/2016    History of diarrhea    Personal history of other specified conditions 06/18/2016    History of nausea    Personal history of peptic ulcer disease     History of peptic ulcer    Personal history of pulmonary embolism 08/21/2020    History of pulmonary embolism    Personal history of urinary (tract) infections 11/05/2015    History of urinary tract infection    Piriformis syndrome of right side 04/06/2023    Postnasal drip 04/14/2014    Post-nasal drainage    Primary osteoarthritis of first carpometacarpal joint of left hand 04/06/2023    Rash and other nonspecific skin eruption 07/14/2022    Rash    Right upper quadrant abdominal tenderness 05/22/2017    Abdominal tenderness, right upper quadrant    Spondylolisthesis, acquired 04/06/2023    Type 2 diabetes mellitus (CMS/[facility]) 11/21/2022    Formatting of this note might be different from the original. 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    Ulnar neuropathy at elbow of right upper extremity 04/06/2023    Unspecified exophthalmos 07/21/2013    Exophthalmos    Unspecified exophthalmos 10/18/2022    Ocular proptosis    Urinary tract infection, site not specified 04/14/2014    Acute UTI    Vascular disorder of intestine, unspecified (CMS/[facility]) 07/21/2013    Ischemic colitis     Past Surgical History:   Procedure Laterality Date    CT ABDOMEN PELVIS ANGIOGRAM W AND/OR WO IV  CONTRAST  2015    CT ABDOMEN PELVIS ANGIOGRAM W AND/OR WO IV CONTRAST 2015 [facility] CLINICAL LEGAC[facility]    CT ABDOMEN PELVIS ANGIOGRAM W AND/OR WO IV CONTRAST  2022    CT ABDOMEN PELVIS ANGIOGRAM W AND/OR WO IV CONTRAST 2022 DOCTOR OFFICE LEGACY    CT ABDOMEN PELVIS ANGIOGRAM W AND/OR WO IV CONTRAST  2023    CT ABDOMEN PELVIS ANGIOGRAM W AND/OR WO IV CONTRAST [facility] CT    HYSTEROSCOPY  2014    Hysteroscopy With Endometrial Ablation    OTHER SURGICAL HISTORY  2014    Left Hemicolectomy    TONSILLECTOMY  2014    Tonsillectomy    TOTAL VAGINAL HYSTERECTOMY  2014    Vaginal Hysterectomy    TUBAL LIGATION  2014    Tubal Ligation     Family History   Problem Relation Name Age of Onset    Heart disease Mother      Kidney failure Father      Cirrhosis Sister       Social History     Tobacco Use    Smoking status: Former     Packs/day: 0.25     Years: 4.00     Additional pack years: 0.00     Total pack years: 1.00     Types: Cigarettes     Start date:      Quit date:      Years since quittin.2    Smokeless tobacco: Never   Vaping Use    Vaping Use: Never used   Substance Use Topics    Alcohol use: Yes     Comment: wine every now and then    Drug use: Never       Physical Exam   ED Triage Vitals [24 1201]   Temperature Heart Rate Respirations BP   36.6 °C (97.9 °F) 72 20 138/73      Pulse Ox Temp Source Heart Rate Source Patient Position   100 % Temporal Monitor Sitting      BP Location FiO2 (%)     Left arm --       Physical Exam  Constitutional:       General: She is not in acute distress.     Appearance: She is not ill-appearing, toxic-appearing or diaphoretic.   HENT:      Head: Normocephalic and atraumatic.      Nose: No congestion.      Mouth/Throat:      Mouth: Mucous membranes are moist.   Eyes:      General: No scleral icterus.     Extraocular Movements: Extraocular movements intact.      Pupils: Pupils are equal, round, and reactive to light.   Neck:       Vascular: No JVD.   Cardiovascular:      Rate and Rhythm: Normal rate and regular rhythm.      Pulses: Normal pulses.           Radial pulses are 2+ on the right side and 2+ on the left side.        Dorsalis pedis pulses are 2+ on the right side and 2+ on the left side.      Heart sounds: Normal heart sounds.   Pulmonary:      Effort: Pulmonary effort is normal. No respiratory distress.      Breath sounds: Normal breath sounds. No stridor. No decreased breath sounds, wheezing, rhonchi or rales.   Abdominal:      General: There is no distension.      Palpations: Abdomen is soft.   Musculoskeletal:         General: Normal range of motion.      Cervical back: Normal range of motion and neck supple. No rigidity.      Right lower leg: Tenderness present. Edema present.      Left lower leg: No tenderness. No edema.      Comments: RLE: +soft mild pitting edema of the right ankle and adjacent distal lower leg.  No noticeable asymmetric edema throughout the remainder of the RLE.  There is tenderness over the posterior calf.  No other areas of tenderness, nontender over the tib-fib, knee, thigh, ankle, or foot.  Intact full nonpainful range of motion of the hip, knee, and ankle. There is no warmth or erythema. No evidence of intra-articular infection or cellulitis. No cords.  Compartments are soft to palpation.  Skin is intact. Is neurovascularly intact distally, 2+ DP pulses, cap refill <2 sec, warm and well perfused, sensation intact and equal throughout the BLE.    Skin:     General: Skin is warm and dry.      Capillary Refill: Capillary refill takes less than 2 seconds.      Findings: No rash.   Neurological:      General: No focal deficit present.      Mental Status: She is alert and oriented to person, place, and time.      Cranial Nerves: No cranial nerve deficit.      Motor: No weakness.      Gait: Gait normal.   Psychiatric:         Mood and Affect: Mood normal.         Behavior: Behavior normal.         Judgment:  Judgment normal.         ED Course & MDM   ED Course as of 03/24/24 1642   Sun Mar 24, 2024   1508 13:30 12 lead EKG interpreted by myself and my ED attending reveals sinus rhythm with a rate of 76 beats per minute.  RBBB. There are no ST elevations. T wave inversions in V1-V5. No acute ischemic changes identified.  [EH]      ED Course User Index  [EH] Anna Mcclellan PA-C         Diagnoses as of 03/24/24 1642   Right calf pain   Chest pain, unspecified type       Medical Decision Making  ED course / MDM     Summary:  Patient presented with persistent calf pain and swelling for last 1 week.  Had an episode of chest pain yesterday that spontaneously resolved.  Vital signs stable, patient appears nontoxic.  Well-appearing.  Ambulates unassisted.  On exam, there is some tenderness over the right calf, mild edema about the ankle, no erythema or warmth, no sign of cellulitis or septic joint.  Neurovascularly intact.  Lungs clear, heart regular rate and rhythm.  IV established, labs drawn.  Labs are reassuring, no leukocytosis, normal hemoglobin, minor electrolyte abnormalities, GFR and creatinine at baseline.  Normal BNP.  EKG is nonischemic, and troponin is negative x 2.  INR therapeutic at 3.4.  Ultrasound negative for DVT.  Chest x-ray shows no acute process.  X-ray of the right tib-fib shows mild degenerative changes, otherwise unremarkable. Patient case discussed with ED attending Dr. Cooper, who also saw and evaluated the patient. Results and differential were discussed in detail with the patient.  Therapeutic INR, 2 negative ultrasounds, doubt DVT.  No sign of infection.  Advised to use compression stockings and follow-up with her PCP.  Cardiac workup reassuring, no current pain in the ED, 2 negative troponins and nonischemic EKG, doubt ACS or MI.  No pleuritic pain, no cough or hemoptysis, therapeutic INR, doubt PE.  Patient is in agreement the plan for discharge with outpatient follow-up. Patient was given  strict return precautions, understands reasons to return to the ED. Also discussed supportive care instructions. I expressed the importance of outpatient follow up with their PCP. All questions were answered, patient expressed understanding and stated that they would comply.    Patient was advised to follow up with PCP or recommended provider in 2-3 days for another evaluation and exam. I advised patient and family/friend/caregiver/guardian to return or go to closest emergency room immediately if symptoms change, get worse, new symptoms develop prior to follow up. If there is no improvement in symptoms in the next 24 hours they are advised to return for further evaluation and exam. I also explained the plan and treatment course. Patient and family/friend/caregiver/guardian is in agreement with plan, treatment course, and follow up and states verbally that they will comply.    Impression:  1. See diagnosis    Plan: Homegoing. I discussed the differential, results, and discharge plan with the patient and family/friend/caregiver. I emphasized the importance of follow-up with the physician I referred them to in the timeframe recommended.  I explained reasons for the patient to return to the Emergency Department. They agreed that if they feel their condition is worsening or if they have any other concern they should call 911 immediately for further assistance. We also discussed medications that were prescribed including common side effects and interactions. The patient was advised to abstain from driving, operating heavy machinery, or making significant decisions while taking medications such as opiates and muscle relaxers that may impair this. I gave the patient an opportunity to ask all questions they had and answered all of them accordingly. They understand return precautions and discharge instructions. The patient and family/friend/caregiver expressed understanding verbally and that they would comply.        Disposition: Discharge    Patient seen and discussed with Dr. Cooper    This note has been transcribed using voice recognition and may contain grammatical errors, misplaced words, incorrect words, incorrect phrases or other errors.   Procedure  Procedures     Anna Mcclellan PA-C  03/25/24 0649       Anna Mcclellan PA-C  03/25/24 0650

## 2024-03-24 NOTE — ED TRIAGE NOTES
Pt c/o RLE pain. Pt was evaluated in ED and had ultrasound completed. Pt was told to come back in 5 days for another ultrasound.

## 2024-03-25 LAB
ATRIAL RATE: 76 BPM
P AXIS: 39 DEGREES
P OFFSET: 214 MS
P ONSET: 160 MS
PR INTERVAL: 128 MS
Q ONSET: 224 MS
QRS COUNT: 13 BEATS
QRS DURATION: 120 MS
QT INTERVAL: 412 MS
QTC CALCULATION(BAZETT): 463 MS
QTC FREDERICIA: 445 MS
R AXIS: 25 DEGREES
T AXIS: 22 DEGREES
T OFFSET: 430 MS
VENTRICULAR RATE: 76 BPM

## 2024-03-25 NOTE — ED PROVIDER NOTES
Emergency Medicine Attending Attestation:     ED Course as of 03/25/24 0107   Sun Mar 24, 2024   1508 13:30 12 lead EKG interpreted by myself and my ED attending reveals sinus rhythm with a rate of 76 beats per minute.  RBBB. There are no ST elevations. T wave inversions in V1-V5. No acute ischemic changes identified.  [EH]      ED Course User Index  [EH] Anna Mcclellan PA-C         Diagnoses as of 03/25/24 0107   Right calf pain   Chest pain, unspecified type       This patient was seen by the advanced practice provider.  I have personally performed a substantive portion of the encounter.  I have seen and examined the patient; agree with the workup, evaluation, MDM, management and diagnosis.  The care plan has been discussed.      I personally saw the patient and made/approved the management plan and take responsibility for the patient management.    History: right calf pain here for repeat US to eval for DVT  Exam: right calf ttp with moderate swelling  MDM: no DVT.  Pt wearing compression sock at 20 to 30 mmHg.  She follows with Dr. Nguyen.  Patient reassured no DVT.        I independently interpreted patient's EKG and agree with the above mentioned interpretation.      MD eBcky Meyer MD  03/25/24 0108

## 2024-03-26 LAB
INR IN PPP BY COAGULATION ASSAY EXTERNAL: 3.2
PROTHROMBIN TIME (PT) IN PPP BY COAGULATION ASSAY EXTERNAL: NORMAL SECONDS

## 2024-03-27 ENCOUNTER — ANTICOAGULATION - WARFARIN VISIT (OUTPATIENT)
Dept: CARDIOLOGY | Facility: CLINIC | Age: 64
End: 2024-03-27
Payer: COMMERCIAL

## 2024-03-27 ENCOUNTER — HOSPITAL ENCOUNTER (OUTPATIENT)
Dept: RADIOLOGY | Facility: CLINIC | Age: 64
Discharge: HOME | End: 2024-03-27
Payer: COMMERCIAL

## 2024-03-27 ENCOUNTER — APPOINTMENT (OUTPATIENT)
Dept: RADIOLOGY | Facility: CLINIC | Age: 64
End: 2024-03-27
Payer: COMMERCIAL

## 2024-03-27 VITALS — WEIGHT: 179 LBS | HEIGHT: 61 IN | BODY MASS INDEX: 33.79 KG/M2

## 2024-03-27 DIAGNOSIS — Z86.711 HISTORY OF PULMONARY EMBOLUS (PE): ICD-10-CM

## 2024-03-27 DIAGNOSIS — Z12.31 BREAST CANCER SCREENING BY MAMMOGRAM: ICD-10-CM

## 2024-03-27 PROCEDURE — 77063 BREAST TOMOSYNTHESIS BI: CPT | Performed by: RADIOLOGY

## 2024-03-27 PROCEDURE — 77067 SCR MAMMO BI INCL CAD: CPT

## 2024-03-27 PROCEDURE — 77067 SCR MAMMO BI INCL CAD: CPT | Performed by: RADIOLOGY

## 2024-03-27 NOTE — PROGRESS NOTES
Patient identification verified with 2 identifiers.    Location: [facility] Patient Self-Testing Program [phone]    Referring Physician: LARISSA BEARD  Enrollment/ Re-enrollment date: 7/21/2024   INR Goal: 2.0-3.0  INR monitoring is per [facility] protocol.  Anticoagulation Medication: warfarin  Indication: Pulmonary Embolism (PE)    Subjective   Bleeding signs/symptoms: No    Bruising: No   Major bleeding event: No  Thrombosis signs/symptoms: No  Thromboembolic event: No  Missed doses: No  Extra doses: No  Medication changes: No  Dietary changes: No  Change in health: No  Change in activity: No  Alcohol: No  Other concerns: No    Upcoming Procedures:  Does the Patient Have any upcoming procedures that require interruption in anticoagulation therapy? no  Does the patient require bridging? no      Anticoagulation Summary  As of 3/27/2024      INR goal:  2.0-3.0   TTR:  36.2 % (5.8 mo)   INR used for dosing:  3.20 (3/26/2024)   Weekly warfarin total:  32.5 mg               Assessment/Plan   Supratherapeutic     1. New dose: no change -DOSE DECREASED 1 WEEK AGO  2. Next INR: 1 week      Education provided to patient during the visit:  Patient instructed to call in interim with questions, concerns and changes.   Patient educated on interactions between medications and warfarin.   Patient educated on dietary consistency in vitamin k consumption.   Patient educated on affects of alcohol consumption while taking warfarin.   Patient educated on signs of bleeding/clotting.   Patient educated on compliance with dosing, follow up appointments, and prescribed plan of care.    PT IS 8 MONTHS PAST DUE ON ANNUAL METER REVIEW!! LEFT MESSAGE ASKING HER TO CALL AND SCHEDULE ASAP.

## 2024-04-03 ENCOUNTER — ANTICOAGULATION - WARFARIN VISIT (OUTPATIENT)
Dept: CARDIOLOGY | Facility: CLINIC | Age: 64
End: 2024-04-03
Payer: COMMERCIAL

## 2024-04-03 DIAGNOSIS — Z86.711 HISTORY OF PULMONARY EMBOLUS (PE): ICD-10-CM

## 2024-04-04 ENCOUNTER — OFFICE VISIT (OUTPATIENT)
Dept: PRIMARY CARE | Facility: CLINIC | Age: 64
End: 2024-04-04
Payer: COMMERCIAL

## 2024-04-04 VITALS
HEIGHT: 61 IN | OXYGEN SATURATION: 96 % | HEART RATE: 72 BPM | BODY MASS INDEX: 33.82 KG/M2 | SYSTOLIC BLOOD PRESSURE: 130 MMHG | RESPIRATION RATE: 16 BRPM | TEMPERATURE: 98.1 F | DIASTOLIC BLOOD PRESSURE: 79 MMHG

## 2024-04-04 DIAGNOSIS — K51.50 LEFT SIDED ULCERATIVE COLITIS WITHOUT COMPLICATION (MULTI): ICD-10-CM

## 2024-04-04 DIAGNOSIS — E55.9 VITAMIN D DEFICIENCY: ICD-10-CM

## 2024-04-04 DIAGNOSIS — E11.29 TYPE 2 DIABETES MELLITUS WITH MICROALBUMINURIA, WITHOUT LONG-TERM CURRENT USE OF INSULIN (MULTI): ICD-10-CM

## 2024-04-04 DIAGNOSIS — Z79.01 CHRONIC ANTICOAGULATION: ICD-10-CM

## 2024-04-04 DIAGNOSIS — R80.9 TYPE 2 DIABETES MELLITUS WITH MICROALBUMINURIA, WITHOUT LONG-TERM CURRENT USE OF INSULIN (MULTI): ICD-10-CM

## 2024-04-04 DIAGNOSIS — D86.9 SARCOIDOSIS: ICD-10-CM

## 2024-04-04 DIAGNOSIS — I50.22 CHRONIC SYSTOLIC CONGESTIVE HEART FAILURE (MULTI): ICD-10-CM

## 2024-04-04 DIAGNOSIS — N18.31 STAGE 3A CHRONIC KIDNEY DISEASE (MULTI): ICD-10-CM

## 2024-04-04 DIAGNOSIS — E78.2 MIXED HYPERLIPIDEMIA: ICD-10-CM

## 2024-04-04 DIAGNOSIS — D68.61 ANTIPHOSPHOLIPID ANTIBODY SYNDROME (MULTI): Primary | ICD-10-CM

## 2024-04-04 PROCEDURE — 3078F DIAST BP <80 MM HG: CPT | Performed by: FAMILY MEDICINE

## 2024-04-04 PROCEDURE — 3075F SYST BP GE 130 - 139MM HG: CPT | Performed by: FAMILY MEDICINE

## 2024-04-04 PROCEDURE — 3049F LDL-C 100-129 MG/DL: CPT | Performed by: FAMILY MEDICINE

## 2024-04-04 PROCEDURE — 1036F TOBACCO NON-USER: CPT | Performed by: FAMILY MEDICINE

## 2024-04-04 PROCEDURE — 3051F HG A1C>EQUAL 7.0%<8.0%: CPT | Performed by: FAMILY MEDICINE

## 2024-04-04 PROCEDURE — 3008F BODY MASS INDEX DOCD: CPT | Performed by: FAMILY MEDICINE

## 2024-04-04 PROCEDURE — 3060F POS MICROALBUMINURIA REV: CPT | Performed by: FAMILY MEDICINE

## 2024-04-04 PROCEDURE — 99214 OFFICE O/P EST MOD 30 MIN: CPT | Performed by: FAMILY MEDICINE

## 2024-04-04 RX ORDER — FAMOTIDINE 20 MG/1
20 TABLET, FILM COATED ORAL 2 TIMES DAILY
Qty: 180 TABLET | Refills: 3 | Status: SHIPPED | OUTPATIENT
Start: 2024-04-04 | End: 2025-04-04

## 2024-04-04 ASSESSMENT — ENCOUNTER SYMPTOMS
HEADACHES: 0
DEPRESSION: 0
CONFUSION: 0
SHORTNESS OF BREATH: 0
LOSS OF SENSATION IN FEET: 1
COUGH: 0
UNEXPECTED WEIGHT CHANGE: 0
OCCASIONAL FEELINGS OF UNSTEADINESS: 1
PALPITATIONS: 0

## 2024-04-04 NOTE — PROGRESS NOTES
Subjective   Patient ID: Marin Lugo is a 63 y.o. female who presents for Follow-up (Hospital follow up on 3/24/2024).    Here to folljow up    Seen at ER for calf pain and chest pain. They did EKG, CXR, labs. Did not feel she had anything significant going on. She has issues this month with her medication going away.     In the meantime, had repeat echo for CHF. She is not feeling short of breath    Saw Pulmonology re sarcoid.     Will see GI doctor when insurance gets straightened out.     GERD - just ran out of Famotidine.            Current Outpatient Medications:     albuterol 2.5 mg /3 mL (0.083 %) nebulizer solution, Take 3 mL (2.5 mg) by nebulization every 6 hours if needed., Disp: , Rfl:     albuterol 90 mcg/actuation inhaler, Inhale 2 puffs every 6 hours if needed for shortness of breath or wheezing., Disp: 18 g, Rfl: 2    amLODIPine (Norvasc) 10 mg tablet, Take 1 tablet (10 mg) by mouth once daily., Disp: 90 tablet, Rfl: 3    carbidopa-levodopa (Sinemet)  mg tablet, TAKE 1 AND 1/2 TABLETS BY MOUTH DAILY, Disp: 135 tablet, Rfl: 2    colloidal oatmeaL (Eucerin Eczema Relief) 1 % cream, 4 times a day., Disp: , Rfl:     empagliflozin (Jardiance) 25 mg, Take 1 tablet (25 mg) by mouth once daily., Disp: 90 tablet, Rfl: 3    fluticasone (Flonase) 50 mcg/actuation nasal spray, SHAKE LIQUID AND USE 1 TO 2 SPRAYS IN EACH NOSTRIL DAILY AS NEEDED FOR SYMPTOMS OR ALLERGY, Disp: , Rfl:     furosemide (Lasix) 20 mg tablet, TAKE 1 TABLET BY MOUTH EVERY DAY AS NEEDED FOR LEG SWELLING, Disp: 90 tablet, Rfl: 3    Gemtesa 75 mg tablet, Take 1 tablet (75 mg) by mouth once daily., Disp: 30 tablet, Rfl: 11    Jantoven 5 mg tablet, Take by mouth., Disp: , Rfl:     nitroglycerin (Nitrostat) 0.4 mg SL tablet, Place 1 tablet (0.4 mg) under the tongue every 5 minutes if needed for chest pain., Disp: 25 tablet, Rfl: 2    pantoprazole (ProtoNix) 40 mg EC tablet, Take 1 tablet (40 mg) by mouth once daily., Disp: , Rfl:      rosuvastatin (Crestor) 40 mg tablet, Take 1 tablet (40 mg) by mouth once daily., Disp: 90 tablet, Rfl: 1    sildenafil (Revatio) 20 mg tablet, Take 1 tablet (20 mg) by mouth 2 times a day., Disp: , Rfl:     sodium chloride (Ayr) 0.65 % nasal drops, Administer 1 drop into each nostril 4 times a day as needed., Disp: , Rfl:     topiramate (Topamax) 25 mg tablet, Take 4 tablets (100 mg) by mouth once daily., Disp: 360 tablet, Rfl: 3    traMADol (Ultram) 50 mg tablet, Take 1 tablet (50 mg) by mouth 2 times a day., Disp: , Rfl:     valACYclovir (Valtrex) 500 mg tablet, Take 1 tablet (500 mg) by mouth once daily., Disp: 90 tablet, Rfl: 3    famotidine (Pepcid) 20 mg tablet, Take 1 tablet (20 mg) by mouth 2 times a day., Disp: 180 tablet, Rfl: 3    Patient Active Problem List   Diagnosis    Amnestic MCI (mild cognitive impairment with memory loss)    Antiphospholipid antibody syndrome (CMS/HCC)    Asthma    Sleep apnea    Bilateral knee pain    CHF (congestive heart failure) (CMS/[facility])    Stage 3 chronic kidney disease (CMS/HCC)    Chronic midline low back pain without sciatica    Cystitis cystica    Diffusion capacity of lung (dl), decreased    Dry skin dermatitis    Erythema nodosum    Fuchs' endothelial dystrophy    History of DVT (deep vein thrombosis)    HLD (hyperlipidemia)    Hypertension    Inflammatory arthritis    Lumbar radiculopathy    Microscopic hematuria    Migraine without aura and without status migrainosus, not intractable    Obesity    Osteoarthritis, multiple sites    Overactive bladder    Pancreas cyst    Post-thrombotic syndrome    Type 2 diabetes mellitus with microalbuminuria, without long-term current use of insulin (CMS/[facility])    Raynaud's phenomenon without gangrene    Restless legs syndrome    Sarcoidosis    Seasonal allergic rhinitis    Small fiber neuropathy    Tubular adenoma of colon    Vitamin D deficiency    Xerostomia    Peripheral venous insufficiency    Other chest pain    History of  "pulmonary embolus (PE)    Folliculitis decalvans    Contracture, left ankle    Disability of walking    Left sided ulcerative colitis without complication (CMS/HCC)    Chronic anticoagulation         Review of Systems   Constitutional:  Negative for unexpected weight change.   Respiratory:  Negative for cough and shortness of breath.    Cardiovascular:  Negative for chest pain, palpitations and leg swelling.   Skin:  Negative for rash.   Neurological:  Negative for headaches.   Psychiatric/Behavioral:  Negative for confusion.        Objective   /79 (BP Location: Right arm, Patient Position: Sitting, BP Cuff Size: Adult long)   Pulse 72   Temp 36.7 °C (98.1 °F) (Temporal)   Resp 16   Ht 1.549 m (5' 1\")   SpO2 96%   BMI 33.82 kg/m²     Physical Exam  Vitals reviewed.   Constitutional:       Appearance: Normal appearance.   Pulmonary:      Effort: Pulmonary effort is normal.   Neurological:      Mental Status: She is alert.   Psychiatric:         Mood and Affect: Mood normal.         Behavior: Behavior normal.     Echo reviewed EF 65%, mild impairment of relaxation    Mammogram normal.     Reviewed ER notes.     Assessment/Plan   Problem List Items Addressed This Visit       Antiphospholipid antibody syndrome (CMS/HCC) - Primary     On Famotidine for gastric protection, Coumadin monitored by Cardiology.          Relevant Medications    famotidine (Pepcid) 20 mg tablet    CHF (congestive heart failure) (CMS/HCC)     Echo results reviewed with patient. Follows with cardiology.          Stage 3 chronic kidney disease (CMS/HCC)     Needs new nephrology due to change in prior doctors practice. Referral placed.          Relevant Orders    Referral to Nephrology    HLD (hyperlipidemia)    Type 2 diabetes mellitus with microalbuminuria, without long-term current use of insulin (CMS/HCC)     Referral written to nephrology to follow up. Labs due before follow up in June         Relevant Orders    Referral to " Nephrology    Sarcoidosis     Saw Pulmonology ocuple weeks ago. She has no evidenxe of progression of disease annd will saulo nuñez yearly with her new pulmonologist.          Vitamin D deficiency    Left sided ulcerative colitis without complication (CMS/HCC)     Not currently symptomatic. Follows with GI. Gets regular colonoscopies.          Chronic anticoagulation    Relevant Medications    famotidine (Pepcid) 20 mg tablet         Assessment, plans, tests, and follow up discussed with patient and patient verbalized understanding. Marni was given an opportunity to ask questions and  any concerns were addressed including but not limited to test results, follow up.

## 2024-04-05 ENCOUNTER — TELEPHONE (OUTPATIENT)
Dept: PRIMARY CARE | Facility: CLINIC | Age: 64
End: 2024-04-05
Payer: COMMERCIAL

## 2024-04-05 NOTE — ASSESSMENT & PLAN NOTE
Saw Pulmonology ocuple weeks ago. She has no evidenxe of progression of disease anca nuñez yearly with her new pulmonologist.

## 2024-04-05 NOTE — TELEPHONE ENCOUNTER
Patient is calling for a referral to [facility].     Patient will  the printed referral when it is ready  She is trying to apply for medicaid and the office told her she starts the process with a referral from her physician    Please advise

## 2024-04-05 NOTE — PROGRESS NOTES
Pt states that her medicaid coverage was taken away and she cannot test for at least a month.   She states that Dr. Salvador, her pcp is aware.   I left message for pt to call back to set up POCT in [facility].  She needs to be monitored in person until coverage for PST is resumed.

## 2024-04-09 NOTE — TELEPHONE ENCOUNTER
Patient called back and said that Dr Salvador needs to go to a website and print the form.  Web site was given to me as [email or web address]. This is not a valid web page.    I called the patient back and stated that the patient needs to print the form and bring it to us or have [facility] fax us the form..  Patient voiced understanding.

## 2024-04-10 ENCOUNTER — ANTICOAGULATION - WARFARIN VISIT (OUTPATIENT)
Dept: CARDIOLOGY | Facility: CLINIC | Age: 64
End: 2024-04-10
Payer: COMMERCIAL

## 2024-04-10 DIAGNOSIS — Z86.711 HISTORY OF PULMONARY EMBOLUS (PE): Primary | ICD-10-CM

## 2024-04-10 LAB
INR IN PPP BY COAGULATION ASSAY EXTERNAL: 4.4
PROTHROMBIN TIME (PT) IN PPP BY COAGULATION ASSAY EXTERNAL: NORMAL SECONDS

## 2024-04-10 NOTE — PROGRESS NOTES
Patient identification verified with 2 identifiers.    Location: [facility] Patient Self-Testing Program [phone]     Referring Physician: LARISSA BEARD  Enrollment/ Re-enrollment date: 2024   INR Goal: 2.0-3.0  INR monitoring is per [facility] protocol.  Anticoagulation Medication: warfarin  Indication: Pulmonary Embolism (PE)    Subjective   Bleeding signs/symptoms: No    Bruising: No   Major bleeding event: No  Thrombosis signs/symptoms: No  Thromboembolic event: No  Missed doses: No  Extra doses: No  Medication changes: No  Dietary changes: Yes  Pt has been under stress and did not eat her usual amount of greens last week.  Change in health: No  Change in activity: No  Alcohol: No  Other concerns: No    Upcoming Procedures:  Does the Patient Have any upcoming procedures that require interruption in anticoagulation therapy? no  Does the patient require bridging? no      Anticoagulation Summary  As of 4/3/2024      INR goal:  2.0-3.0   TTR:  36.2% (5.8 mo)   INR used for dosin.40 (4/10/2024)   Weekly warfarin total:  30 mg               Assessment/Plan   Supratherapeutic     1. New dose:  hold 2 doses of warfarin, decrease weekly dose     2. Next INR: 1 week      Education provided to patient during the visit:  Patient instructed to call in interim with questions, concerns and changes.   Patient educated on interactions between medications and warfarin.   Patient educated on dietary consistency in vitamin k consumption.   Patient educated on affects of alcohol consumption while taking warfarin.   Patient educated on signs of bleeding/clotting.   Patient educated on compliance with dosing, follow up appointments, and prescribed plan of care.

## 2024-04-22 LAB
INR IN PPP BY COAGULATION ASSAY EXTERNAL: 1.7
PROTHROMBIN TIME (PT) IN PPP BY COAGULATION ASSAY EXTERNAL: NORMAL SECONDS

## 2024-04-22 NOTE — PROGRESS NOTES
Patient identification verified with 2 identifiers.    Location: [facility] Patient Self-Testing Program [phone]     Referring Physician: LARISSA BEARD  Enrollment/ Re-enrollment date: 2024   INR Goal: 2.0-3.0  INR monitoring is per [facility] protocol.  Anticoagulation Medication: warfarin  Indication: Pulmonary Embolism (PE)    Subjective   Bleeding signs/symptoms: No    Bruising: No   Major bleeding event: No  Thrombosis signs/symptoms: No  Thromboembolic event: No  Missed doses: Yes  Patient forgot 2 doses this past week  Extra doses: No  Medication changes: No  Dietary changes: No  Change in health: No  Change in activity: No  Alcohol: No  Other concerns: No    Upcoming Procedures:  Does the Patient Have any upcoming procedures that require interruption in anticoagulation therapy? no  Does the patient require bridging? no      Anticoagulation Summary  As of 4/10/2024      INR goal:  2.0-3.0   TTR:  33.5% (6.3 mo)   INR used for dosin.70 (2024)   Weekly warfarin total:  30 mg               Assessment/Plan   Subtherapeutic     1. New dose: Will give a one-time dose today, maintain dose and patient will test in 1 week.    2. Next INR: 1 week      Education provided to patient during the visit:  Patient instructed to call in interim with questions, concerns and changes.

## 2024-04-30 ENCOUNTER — ANTICOAGULATION - WARFARIN VISIT (OUTPATIENT)
Dept: CARDIOLOGY | Facility: CLINIC | Age: 64
End: 2024-04-30
Payer: COMMERCIAL

## 2024-04-30 DIAGNOSIS — Z86.711 HISTORY OF PULMONARY EMBOLUS (PE): ICD-10-CM

## 2024-05-01 ENCOUNTER — ANTICOAGULATION - WARFARIN VISIT (OUTPATIENT)
Dept: CARDIOLOGY | Facility: CLINIC | Age: 64
End: 2024-05-01
Payer: COMMERCIAL

## 2024-05-01 DIAGNOSIS — Z86.711 HISTORY OF PULMONARY EMBOLUS (PE): ICD-10-CM

## 2024-05-01 LAB
INR IN PPP BY COAGULATION ASSAY EXTERNAL: 2.7
PROTHROMBIN TIME (PT) IN PPP BY COAGULATION ASSAY EXTERNAL: NORMAL SECONDS

## 2024-05-01 NOTE — PROGRESS NOTES
Patient identification verified with 2 identifiers.    Location: [facility] Patient Self-Testing Program [phone]    Referring Physician: LARISSA BEARD  Enrollment/ Re-enrollment date: 2024   INR Goal: 2.0-3.0  INR monitoring is per [facility] protocol.  Anticoagulation Medication: warfarin  Indication: Pulmonary Embolism (PE)    Subjective   Bleeding signs/symptoms: No    Bruising: No   Major bleeding event: No  Thrombosis signs/symptoms: No  Thromboembolic event: No  Missed doses: No  Extra doses: No  Medication changes: No  Dietary changes: No  Change in health: No  Change in activity: No  Alcohol: No  Other concerns: No    Upcoming Procedures:  Does the Patient Have any upcoming procedures that require interruption in anticoagulation therapy? no  Does the patient require bridging? no      Anticoagulation Summary  As of 2024      INR goal:  2.0-3.0   TTR:  35.2% (7 mo)   INR used for dosin.70 (2024)   Weekly warfarin total:  35 mg               Assessment/Plan   Therapeutic     1. New dose:  PT TOOK WRONG AMOUNT.  WILL INCREASE THIS WEEK     2. Next INR: 1 week      Education provided to patient during the visit:  Patient instructed to call in interim with questions, concerns and changes.   Patient educated on interactions between medications and warfarin.   Patient educated on dietary consistency in vitamin k consumption.   Patient educated on affects of alcohol consumption while taking warfarin.   Patient educated on signs of bleeding/clotting.   Patient educated on compliance with dosing, follow up appointments, and prescribed plan of care.

## 2024-05-02 ENCOUNTER — LAB (OUTPATIENT)
Dept: LAB | Facility: LAB | Age: 64
End: 2024-05-02
Payer: COMMERCIAL

## 2024-05-02 DIAGNOSIS — R80.9 TYPE 2 DIABETES MELLITUS WITH MICROALBUMINURIA, WITHOUT LONG-TERM CURRENT USE OF INSULIN (MULTI): ICD-10-CM

## 2024-05-02 DIAGNOSIS — E11.29 TYPE 2 DIABETES MELLITUS WITH MICROALBUMINURIA, WITHOUT LONG-TERM CURRENT USE OF INSULIN (MULTI): ICD-10-CM

## 2024-05-02 DIAGNOSIS — E78.2 MIXED HYPERLIPIDEMIA: ICD-10-CM

## 2024-05-02 DIAGNOSIS — N18.31 STAGE 3A CHRONIC KIDNEY DISEASE (MULTI): ICD-10-CM

## 2024-05-02 LAB
ALBUMIN SERPL BCP-MCNC: 4.2 G/DL (ref 3.4–5)
ALP SERPL-CCNC: 103 U/L (ref 33–136)
ALT SERPL W P-5'-P-CCNC: 12 U/L (ref 7–45)
ANION GAP SERPL CALC-SCNC: 10 MMOL/L (ref 10–20)
AST SERPL W P-5'-P-CCNC: 15 U/L (ref 9–39)
BILIRUB SERPL-MCNC: 0.7 MG/DL (ref 0–1.2)
BUN SERPL-MCNC: 12 MG/DL (ref 6–23)
CALCIUM SERPL-MCNC: 9 MG/DL (ref 8.6–10.3)
CHLORIDE SERPL-SCNC: 114 MMOL/L (ref 98–107)
CHOLEST SERPL-MCNC: 137 MG/DL (ref 0–199)
CHOLESTEROL/HDL RATIO: 3
CO2 SERPL-SCNC: 23 MMOL/L (ref 21–32)
CREAT SERPL-MCNC: 0.93 MG/DL (ref 0.5–1.05)
CREAT UR-MCNC: 63.5 MG/DL (ref 20–320)
EGFRCR SERPLBLD CKD-EPI 2021: 70 ML/MIN/1.73M*2
GLUCOSE SERPL-MCNC: 99 MG/DL (ref 74–99)
HDLC SERPL-MCNC: 45 MG/DL
LDLC SERPL CALC-MCNC: 63 MG/DL
MICROALBUMIN UR-MCNC: 15.6 MG/L
MICROALBUMIN/CREAT UR: 24.6 UG/MG CREAT
NON HDL CHOLESTEROL: 92 MG/DL (ref 0–149)
POTASSIUM SERPL-SCNC: 3.7 MMOL/L (ref 3.5–5.3)
PROT SERPL-MCNC: 7 G/DL (ref 6.4–8.2)
SODIUM SERPL-SCNC: 143 MMOL/L (ref 136–145)
TRIGL SERPL-MCNC: 146 MG/DL (ref 0–149)
VLDL: 29 MG/DL (ref 0–40)

## 2024-05-02 PROCEDURE — 36415 COLL VENOUS BLD VENIPUNCTURE: CPT

## 2024-05-02 PROCEDURE — 83036 HEMOGLOBIN GLYCOSYLATED A1C: CPT

## 2024-05-02 PROCEDURE — 80061 LIPID PANEL: CPT

## 2024-05-02 PROCEDURE — 82043 UR ALBUMIN QUANTITATIVE: CPT

## 2024-05-02 PROCEDURE — 82570 ASSAY OF URINE CREATININE: CPT

## 2024-05-02 PROCEDURE — 80053 COMPREHEN METABOLIC PANEL: CPT

## 2024-05-03 LAB
EST. AVERAGE GLUCOSE BLD GHB EST-MCNC: 140 MG/DL
HBA1C MFR BLD: 6.5 %

## 2024-05-07 ENCOUNTER — ANTICOAGULATION - WARFARIN VISIT (OUTPATIENT)
Dept: CARDIOLOGY | Facility: CLINIC | Age: 64
End: 2024-05-07
Payer: COMMERCIAL

## 2024-05-07 DIAGNOSIS — Z86.711 HISTORY OF PULMONARY EMBOLUS (PE): Primary | ICD-10-CM

## 2024-05-08 ENCOUNTER — ANTICOAGULATION - WARFARIN VISIT (OUTPATIENT)
Dept: CARDIOLOGY | Facility: CLINIC | Age: 64
End: 2024-05-08
Payer: COMMERCIAL

## 2024-05-08 DIAGNOSIS — Z86.711 HISTORY OF PULMONARY EMBOLUS (PE): Primary | ICD-10-CM

## 2024-05-08 NOTE — PROGRESS NOTES
Patient identification verified with 2 identifiers.    Location: [facility] Patient Self-Testing Program [phone]    Referring Physician: DR. BEARD  Enrollment/ Re-enrollment date: 24   INR Goal: 2.0-3.0  INR monitoring is per [facility] protocol.  Anticoagulation Medication: warfarin  Indication: Pulmonary Embolism (PE)    Subjective   Bleeding signs/symptoms: No    Bruising: No   Major bleeding event: No  Thrombosis signs/symptoms: No  Thromboembolic event: No  Missed doses: No  Extra doses: No  Medication changes: No  Dietary changes: No  Change in health: No  Change in activity: No  Alcohol: No  Other concerns: No    Upcoming Procedures:  Does the Patient Have any upcoming procedures that require interruption in anticoagulation therapy? no  Does the patient require bridging? no      Anticoagulation Summary  As of 2024      INR goal:  2.0-3.0   TTR:  37.3% (7.2 mo)   INR used for dosin.30 (2024)   Weekly warfarin total:  35 mg               Assessment/Plan   Therapeutic     1. New dose: no change    2. Next INR: 2 weeks AT [place] ANNUAL METER REVIEW.  I SPOKE TO PT CONFIRMING CURRENT WARFARIN DOSING.   PT WILL  MAINTAIN CURRENT WEEKLY DOSE SCHEDULE.  PT VERBALIZED UNDERSTANDING OF THESE DOSING INSTRUCTIONS.  AGREED TO METER REVIEW ON 24      Education provided to patient during the visit:  Patient instructed to call in interim with questions, concerns and changes.

## 2024-05-14 ENCOUNTER — HOSPITAL ENCOUNTER (OUTPATIENT)
Dept: RADIOLOGY | Facility: CLINIC | Age: 64
Discharge: HOME | End: 2024-05-14
Payer: COMMERCIAL

## 2024-05-14 DIAGNOSIS — N18.31 CHRONIC KIDNEY DISEASE, STAGE 3A (MULTI): ICD-10-CM

## 2024-05-14 PROCEDURE — 76770 US EXAM ABDO BACK WALL COMP: CPT

## 2024-05-15 ENCOUNTER — ANTICOAGULATION - WARFARIN VISIT (OUTPATIENT)
Dept: CARDIOLOGY | Facility: CLINIC | Age: 64
End: 2024-05-15
Payer: COMMERCIAL

## 2024-05-15 DIAGNOSIS — Z86.711 HISTORY OF PULMONARY EMBOLUS (PE): ICD-10-CM

## 2024-05-15 NOTE — PROGRESS NOTES
Patient identification verified with 2 identifiers.    Location: [facility] Patient Self-Testing Program [phone]    Referring Physician: DR. LARISSA JONES  Enrollment/ Re-enrollment date: 2024   INR Goal: 2.0-3.0  INR monitoring is per [facility] protocol.  Anticoagulation Medication: warfarin  Indication: Pulmonary Embolism (PE)    Subjective   Bleeding signs/symptoms: No    Bruising: No   Major bleeding event: No  Thrombosis signs/symptoms: No  Thromboembolic event: No  Missed doses: Yes  PT REPORTS MISSING 1 DOSE OF WARFARIN  Extra doses: No  Medication changes: No  Dietary changes: No  Change in health: No  Change in activity: No  Alcohol: No  Other concerns: No    Upcoming Procedures:  Does the Patient Have any upcoming procedures that require interruption in anticoagulation therapy? no  Does the patient require bridging? no      Anticoagulation Summary  As of 5/15/2024      INR goal:  2.0-3.0   TTR:  37.5% (7.4 mo)   INR used for dosin.60 (5/15/2024)   Weekly warfarin total:  35 mg               Assessment/Plan   Subtherapeutic     1. New dose: no change  SPOKE TO PT. CONFIRMED CURRENT DOSING SCHEDULE. PT MISSED A DOSE. WILL MAINTAIN TWD  2. Next INR: 1 week      Education provided to patient during the visit:  Patient instructed to call in interim with questions, concerns and changes.   Patient educated on compliance with dosing, follow up appointments, and prescribed plan of care.

## 2024-05-16 ENCOUNTER — OFFICE VISIT (OUTPATIENT)
Dept: PRIMARY CARE | Facility: CLINIC | Age: 64
End: 2024-05-16
Payer: COMMERCIAL

## 2024-05-16 ENCOUNTER — APPOINTMENT (OUTPATIENT)
Dept: NEPHROLOGY | Facility: CLINIC | Age: 64
End: 2024-05-16
Payer: COMMERCIAL

## 2024-05-16 VITALS
SYSTOLIC BLOOD PRESSURE: 132 MMHG | BODY MASS INDEX: 33.82 KG/M2 | RESPIRATION RATE: 16 BRPM | HEIGHT: 61 IN | OXYGEN SATURATION: 96 % | TEMPERATURE: 97.3 F | HEART RATE: 69 BPM | DIASTOLIC BLOOD PRESSURE: 82 MMHG

## 2024-05-16 DIAGNOSIS — N18.31 STAGE 3A CHRONIC KIDNEY DISEASE (MULTI): ICD-10-CM

## 2024-05-16 DIAGNOSIS — I10 HYPERTENSION, UNSPECIFIED TYPE: ICD-10-CM

## 2024-05-16 DIAGNOSIS — E55.9 VITAMIN D DEFICIENCY: ICD-10-CM

## 2024-05-16 DIAGNOSIS — B00.1 RECURRENT COLD SORES: ICD-10-CM

## 2024-05-16 DIAGNOSIS — R80.9 TYPE 2 DIABETES MELLITUS WITH MICROALBUMINURIA, WITHOUT LONG-TERM CURRENT USE OF INSULIN (MULTI): Primary | ICD-10-CM

## 2024-05-16 DIAGNOSIS — I50.9 CONGESTIVE HEART FAILURE, UNSPECIFIED HF CHRONICITY, UNSPECIFIED HEART FAILURE TYPE (MULTI): ICD-10-CM

## 2024-05-16 DIAGNOSIS — E78.2 MIXED HYPERLIPIDEMIA: ICD-10-CM

## 2024-05-16 DIAGNOSIS — E11.29 TYPE 2 DIABETES MELLITUS WITH MICROALBUMINURIA, WITHOUT LONG-TERM CURRENT USE OF INSULIN (MULTI): Primary | ICD-10-CM

## 2024-05-16 DIAGNOSIS — I50.20 SYSTOLIC CONGESTIVE HEART FAILURE, UNSPECIFIED HF CHRONICITY (MULTI): ICD-10-CM

## 2024-05-16 DIAGNOSIS — Z79.01 CHRONIC ANTICOAGULATION: ICD-10-CM

## 2024-05-16 DIAGNOSIS — M79.671 FOOT PAIN, RIGHT: ICD-10-CM

## 2024-05-16 DIAGNOSIS — I73.00 RAYNAUD'S PHENOMENON WITHOUT GANGRENE: ICD-10-CM

## 2024-05-16 PROCEDURE — 3079F DIAST BP 80-89 MM HG: CPT | Performed by: FAMILY MEDICINE

## 2024-05-16 PROCEDURE — 99215 OFFICE O/P EST HI 40 MIN: CPT | Performed by: FAMILY MEDICINE

## 2024-05-16 PROCEDURE — 3008F BODY MASS INDEX DOCD: CPT | Performed by: FAMILY MEDICINE

## 2024-05-16 PROCEDURE — 3044F HG A1C LEVEL LT 7.0%: CPT | Performed by: FAMILY MEDICINE

## 2024-05-16 PROCEDURE — 3048F LDL-C <100 MG/DL: CPT | Performed by: FAMILY MEDICINE

## 2024-05-16 PROCEDURE — 3061F NEG MICROALBUMINURIA REV: CPT | Performed by: FAMILY MEDICINE

## 2024-05-16 PROCEDURE — 3075F SYST BP GE 130 - 139MM HG: CPT | Performed by: FAMILY MEDICINE

## 2024-05-16 RX ORDER — FUROSEMIDE 20 MG/1
TABLET ORAL
Qty: 90 TABLET | Refills: 3 | Status: SHIPPED | OUTPATIENT
Start: 2024-05-16

## 2024-05-16 RX ORDER — ROSUVASTATIN CALCIUM 40 MG/1
40 TABLET, COATED ORAL DAILY
Qty: 90 TABLET | Refills: 1 | Status: SHIPPED | OUTPATIENT
Start: 2024-05-16 | End: 2024-11-12

## 2024-05-16 RX ORDER — AMLODIPINE BESYLATE 10 MG/1
10 TABLET ORAL DAILY
Qty: 90 TABLET | Refills: 3 | Status: SHIPPED | OUTPATIENT
Start: 2024-05-16 | End: 2025-05-16

## 2024-05-16 RX ORDER — VALACYCLOVIR HYDROCHLORIDE 500 MG/1
500 TABLET, FILM COATED ORAL DAILY
Qty: 90 TABLET | Refills: 3 | Status: SHIPPED | OUTPATIENT
Start: 2024-05-16 | End: 2025-05-16

## 2024-05-16 ASSESSMENT — PATIENT HEALTH QUESTIONNAIRE - PHQ9
2. FEELING DOWN, DEPRESSED OR HOPELESS: NOT AT ALL
SUM OF ALL RESPONSES TO PHQ9 QUESTIONS 1 AND 2: 1
10. IF YOU CHECKED OFF ANY PROBLEMS, HOW DIFFICULT HAVE THESE PROBLEMS MADE IT FOR YOU TO DO YOUR WORK, TAKE CARE OF THINGS AT HOME, OR GET ALONG WITH OTHER PEOPLE: NOT DIFFICULT AT ALL
1. LITTLE INTEREST OR PLEASURE IN DOING THINGS: SEVERAL DAYS

## 2024-05-16 ASSESSMENT — ENCOUNTER SYMPTOMS
OCCASIONAL FEELINGS OF UNSTEADINESS: 1
DEPRESSION: 0
LOSS OF SENSATION IN FEET: 1

## 2024-05-16 ASSESSMENT — ACTIVITIES OF DAILY LIVING (ADL)
DOING_HOUSEWORK: INDEPENDENT
DRESSING: INDEPENDENT
MANAGING_FINANCES: INDEPENDENT
TAKING_MEDICATION: INDEPENDENT
BATHING: INDEPENDENT
GROCERY_SHOPPING: INDEPENDENT

## 2024-05-16 ASSESSMENT — PAIN SCALES - GENERAL: PAINLEVEL: 8

## 2024-05-16 NOTE — PATIENT INSTRUCTIONS
Continue same medication.     Will see you in 3 months, will get labs first. Call me and let me know when you get labs for kidney doctor. I will look them up.

## 2024-05-16 NOTE — PROGRESS NOTES
Subjective   Patient ID: Marni Lugo is a 63 y.o. female who presents for Medicare Annual Wellness Visit Subsequent (Medicare wellness visit , 3 month follow up with labs and swelling and  pain right foot ).    Was here for Wellness. Having issues so will do problem visit today.    Has had foot pain and was to see Podiatrist. Yesterday, grandson fell on it and hit it. Is swollen bad on top. Painful. Ankle painful too.     Type 2 DM with microalbuminuria- On Jardiance 25 mg, She is on Rosuvastatin, Amlodipine 10 mg daily,     Has CHF, Needs new cardiologist. She is doing ok on Furosemide. No shortness of breath. Also on it for venous insufficiency. Using compression socks. On Sildenafil, but is for Raynauds disease of fingers.     Antiphospholipid antibody syndrome - on Jantoven and [facility].     Hyperlipidemia - on Rosuvastatin 40 mg daily. No side effects. Eating not good per patient. Currently is staying with daughter to help her during divorce.     CKD3 - saw nephrology. US done yesterday. Has labs ordered.                Current Outpatient Medications:     albuterol 2.5 mg /3 mL (0.083 %) nebulizer solution, Take 3 mL (2.5 mg) by nebulization every 6 hours if needed., Disp: , Rfl:     albuterol 90 mcg/actuation inhaler, Inhale 2 puffs every 6 hours if needed for shortness of breath or wheezing., Disp: 18 g, Rfl: 2    amLODIPine (Norvasc) 10 mg tablet, Take 1 tablet (10 mg) by mouth once daily., Disp: 90 tablet, Rfl: 3    carbidopa-levodopa (Sinemet)  mg tablet, TAKE 1 AND 1/2 TABLETS BY MOUTH DAILY, Disp: 135 tablet, Rfl: 2    colloidal oatmeaL (Eucerin Eczema Relief) 1 % cream, 4 times a day., Disp: , Rfl:     empagliflozin (Jardiance) 25 mg, Take 1 tablet (25 mg) by mouth once daily., Disp: 90 tablet, Rfl: 3    famotidine (Pepcid) 20 mg tablet, Take 1 tablet (20 mg) by mouth 2 times a day., Disp: 180 tablet, Rfl: 3    fluticasone (Flonase) 50 mcg/actuation nasal spray, SHAKE LIQUID  AND USE 1 TO 2 SPRAYS IN EACH NOSTRIL DAILY AS NEEDED FOR SYMPTOMS OR ALLERGY, Disp: , Rfl:     furosemide (Lasix) 20 mg tablet, TAKE 1 TABLET BY MOUTH EVERY DAY AS NEEDED FOR LEG SWELLING, Disp: 90 tablet, Rfl: 3    Gemtesa 75 mg tablet, Take 1 tablet (75 mg) by mouth once daily., Disp: 30 tablet, Rfl: 11    Jantoven 5 mg tablet, Take by mouth., Disp: , Rfl:     nitroglycerin (Nitrostat) 0.4 mg SL tablet, Place 1 tablet (0.4 mg) under the tongue every 5 minutes if needed for chest pain., Disp: 25 tablet, Rfl: 2    pantoprazole (ProtoNix) 40 mg EC tablet, Take 1 tablet (40 mg) by mouth once daily., Disp: , Rfl:     rosuvastatin (Crestor) 40 mg tablet, Take 1 tablet (40 mg) by mouth once daily., Disp: 90 tablet, Rfl: 1    sildenafil (Revatio) 20 mg tablet, Take 1 tablet (20 mg) by mouth 2 times a day., Disp: , Rfl:     sodium chloride (Ayr) 0.65 % nasal drops, Administer 1 drop into each nostril 4 times a day as needed., Disp: , Rfl:     topiramate (Topamax) 25 mg tablet, Take 4 tablets (100 mg) by mouth once daily., Disp: 360 tablet, Rfl: 3    valACYclovir (Valtrex) 500 mg tablet, Take 1 tablet (500 mg) by mouth once daily., Disp: 90 tablet, Rfl: 3    Patient Active Problem List   Diagnosis    Amnestic MCI (mild cognitive impairment with memory loss)    Antiphospholipid antibody syndrome (Multi)    Asthma ([facility])    Sleep apnea    Bilateral knee pain    CHF (congestive heart failure) (Multi)    Stage 3 chronic kidney disease (Multi)    Chronic midline low back pain without sciatica    Cystitis cystica    Diffusion capacity of lung (dl), decreased    Dry skin dermatitis    Erythema nodosum    Fuchs' endothelial dystrophy    History of DVT (deep vein thrombosis)    HLD (hyperlipidemia)    Hypertension    Inflammatory arthritis    Lumbar radiculopathy    Microscopic hematuria    Migraine without aura and without status migrainosus, not intractable    Obesity    Osteoarthritis, multiple sites    Overactive bladder     "Pancreas cyst ([facility])    Post-thrombotic syndrome    Type 2 diabetes mellitus with microalbuminuria, without long-term current use of insulin (Multi)    Raynaud's phenomenon without gangrene    Restless legs syndrome    Sarcoidosis    Seasonal allergic rhinitis    Small fiber neuropathy    Tubular adenoma of colon    Vitamin D deficiency    Xerostomia    Peripheral venous insufficiency    Other chest pain    History of pulmonary embolus (PE)    Folliculitis decalvans    Contracture, left ankle    Disability of walking    Left sided ulcerative colitis without complication (Multi)    Chronic anticoagulation    Recurrent cold sores    Foot pain, right         Review of Systems   Constitutional:  Negative for appetite change, fatigue and unexpected weight change.   HENT:  Negative for trouble swallowing.    Respiratory:  Negative for shortness of breath.    Cardiovascular:  Negative for chest pain, palpitations and leg swelling.   Gastrointestinal:  Negative for diarrhea and nausea.   Endocrine: Negative for cold intolerance, heat intolerance, polydipsia, polyphagia and polyuria.   Musculoskeletal:  Negative for myalgias.   Skin:  Negative for rash.   Neurological:  Negative for dizziness and headaches.       Objective   /82 (BP Location: Right arm, Patient Position: Sitting, BP Cuff Size: Large adult)   Pulse 69   Temp 36.3 °C (97.3 °F) (Temporal)   Resp 16   Ht 1.549 m (5' 1\")   SpO2 96%   BMI 33.82 kg/m²     Physical Exam  Vitals reviewed.   Constitutional:       Appearance: Normal appearance.   Cardiovascular:      Pulses:           Dorsalis pedis pulses are 1+ on the right side and 1+ on the left side.        Posterior tibial pulses are 1+ on the right side and 1+ on the left side.   Pulmonary:      Effort: Pulmonary effort is normal.   Musculoskeletal:      Right foot: Normal range of motion. No deformity.      Left foot: Normal range of motion. No deformity.   Feet:      Right foot:      Protective " Sensation: 10 sites tested.  3 sites sensed.      Skin integrity: Skin integrity normal. No ulcer.      Left foot:      Protective Sensation: 10 sites tested.  7 sites sensed.      Skin integrity: Skin integrity normal. No ulcer.      Comments: Right foot swollen and tender dorsum up to ankle bilaterally. ROM ankle painful but can do it.     Neurological:      Mental Status: She is alert.   Psychiatric:         Mood and Affect: Mood normal.         Behavior: Behavior normal.       Recent Results (from the past 1008 hour(s))   Protime-INR    Collection Time: 04/10/24 12:00 AM   Result Value Ref Range    Protime External  seconds    INR External 4.40    Protime-INR    Collection Time: 04/22/24 12:00 AM   Result Value Ref Range    Protime External  seconds    INR External 1.70    Protime-INR    Collection Time: 05/01/24 12:00 AM   Result Value Ref Range    Protime External  seconds    INR External 2.70    Comprehensive Metabolic Panel    Collection Time: 05/02/24  3:36 PM   Result Value Ref Range    Glucose 99 74 - 99 mg/dL    Sodium 143 136 - 145 mmol/L    Potassium 3.7 3.5 - 5.3 mmol/L    Chloride 114 (H) 98 - 107 mmol/L    Bicarbonate 23 21 - 32 mmol/L    Anion Gap 10 10 - 20 mmol/L    Urea Nitrogen 12 6 - 23 mg/dL    Creatinine 0.93 0.50 - 1.05 mg/dL    eGFR 70 >60 mL/min/1.73m*2    Calcium 9.0 8.6 - 10.3 mg/dL    Albumin 4.2 3.4 - 5.0 g/dL    Alkaline Phosphatase 103 33 - 136 U/L    Total Protein 7.0 6.4 - 8.2 g/dL    AST 15 9 - 39 U/L    Bilirubin, Total 0.7 0.0 - 1.2 mg/dL    ALT 12 7 - 45 U/L   Hemoglobin A1C    Collection Time: 05/02/24  3:36 PM   Result Value Ref Range    Hemoglobin A1C 6.5 (H) see below %    Estimated Average Glucose 140 Not Established mg/dL   Lipid Panel    Collection Time: 05/02/24  3:36 PM   Result Value Ref Range    Cholesterol 137 0 - 199 mg/dL    HDL-Cholesterol 45.0 mg/dL    Cholesterol/HDL Ratio 3.0     LDL Calculated 63 <=99 mg/dL    VLDL 29 0 - 40 mg/dL    Triglycerides 146 0 -  149 mg/dL    Non HDL Cholesterol 92 0 - 149 mg/dL   Albumin , Urine Random    Collection Time: 05/02/24  3:41 PM   Result Value Ref Range    Albumin, Urine Random 15.6 Not established mg/L    Creatinine, Urine Random 63.5 20.0 - 320.0 mg/dL    Albumin/Creatine Ratio 24.6 <30.0 ug/mg Creat   Protime-INR    Collection Time: 05/08/24 12:00 AM   Result Value Ref Range    Protime External  seconds    INR External 2.30    Protime-INR    Collection Time: 05/15/24 12:00 AM   Result Value Ref Range    Protime External  seconds    INR External 1.60          Assessment/Plan   Problem List Items Addressed This Visit       CHF (congestive heart failure) (Multi)     Has been not following with cardiology. Prefers [place] and now we have a cardiologist here. Referred to Cardiology here in [place]. She appears to be well-compenstated today         Relevant Medications    furosemide (Lasix) 20 mg tablet    amLODIPine (Norvasc) 10 mg tablet    Other Relevant Orders    Referral to Cardiology    Referral to Cardiology    Stage 3 chronic kidney disease (Multi)     Microalbumin and GFR back to normal. Has follow up with renal coming up.          HLD (hyperlipidemia)     Marked improvement in lipids. Continue the good work. Recheck next visit.          Relevant Medications    rosuvastatin (Crestor) 40 mg tablet    Hypertension     On Amlodipine. BP up today but walked up with painful injured foot. Lab reviewed with patient. Congratulated on progress. Limit sodium         Relevant Medications    amLODIPine (Norvasc) 10 mg tablet    Type 2 diabetes mellitus with microalbuminuria, without long-term current use of insulin (Multi) - Primary     She is doing better. Microalbumin back to normal with improved control. She was very excited about that. Foot exam today, ok except for her injury on right foot. Due for eye doctor. Continue medication. Follow up in 3 months         Raynaud's phenomenon without gangrene     On Sildenafil. Has  been taking sporadically instead of scheduled and having symptoms. Will work on being more faithful about taking it.          Relevant Orders    Referral to Cardiology    Vitamin D deficiency     On OTC supplement. Will recheck level with next labs         Chronic anticoagulation     Due to history of blood clots, getting Warfarin from [facility].          Recurrent cold sores     Doing much bttr with valtres prophylaxis         Relevant Medications    valACYclovir (Valtrex) 500 mg tablet    Foot pain, right     Injurd at [facility] fl dirctly on it. Pain and tndrness and swelling dorsum mid and zoila foot, also by mallcorali. Xray ordered. Offered Postop shoe. She will get xray in am.          Relevant Orders    XR foot right 3+ views         Assessment, plans, tests, and follow up discussed with patient and patient verbalized understanding. Marni was given an opportunity to ask questions and  any concerns were addressed including but not limited to orders, follow up, goals, lab results and follow up..

## 2024-05-17 ENCOUNTER — HOSPITAL ENCOUNTER (OUTPATIENT)
Dept: RADIOLOGY | Facility: CLINIC | Age: 64
Discharge: HOME | End: 2024-05-17
Payer: COMMERCIAL

## 2024-05-17 DIAGNOSIS — M79.671 FOOT PAIN, RIGHT: ICD-10-CM

## 2024-05-17 PROBLEM — B00.1 RECURRENT COLD SORES: Status: ACTIVE | Noted: 2024-05-17

## 2024-05-17 PROCEDURE — 73630 X-RAY EXAM OF FOOT: CPT | Mod: RIGHT SIDE | Performed by: RADIOLOGY

## 2024-05-17 PROCEDURE — 73630 X-RAY EXAM OF FOOT: CPT | Mod: RT

## 2024-05-17 ASSESSMENT — ENCOUNTER SYMPTOMS
POLYDIPSIA: 0
NAUSEA: 0
TROUBLE SWALLOWING: 0
UNEXPECTED WEIGHT CHANGE: 0
FATIGUE: 0
DIARRHEA: 0
SHORTNESS OF BREATH: 0
MYALGIAS: 0
APPETITE CHANGE: 0
DIZZINESS: 0
PALPITATIONS: 0
POLYPHAGIA: 0
HEADACHES: 0

## 2024-05-17 NOTE — ASSESSMENT & PLAN NOTE
On Amlodipine. BP up today but walked up with painful injured foot. Lab reviewed with patient. Congratulated on progress. Limit sodium

## 2024-05-17 NOTE — ASSESSMENT & PLAN NOTE
Injurd at [facility] fll dirctly on it. Pain and tndrness and swelling dorsum mid and zoila foot, also by malleili. Xray ordered. Offered Postop shoe. She will get xray in am.

## 2024-05-17 NOTE — ASSESSMENT & PLAN NOTE
She is doing better. Microalbumin back to normal with improved control. She was very excited about that. Foot exam today, ok except for her injury on right foot. Due for eye doctor. Continue medication. Follow up in 3 months

## 2024-05-17 NOTE — ASSESSMENT & PLAN NOTE
On Sildenafil. Has been taking sporadically instead of scheduled and having symptoms. Will work on being more faithful about taking it.

## 2024-05-17 NOTE — ASSESSMENT & PLAN NOTE
Has been not following with cardiology. Prefers [place] and now we have a cardiologist here. Referred to Cardiology here in [place]. She appears to be well-compenstated today

## 2024-05-20 ENCOUNTER — PATIENT MESSAGE (OUTPATIENT)
Dept: PRIMARY CARE | Facility: CLINIC | Age: 64
End: 2024-05-20
Payer: COMMERCIAL

## 2024-05-20 ENCOUNTER — TELEPHONE (OUTPATIENT)
Dept: PRIMARY CARE | Facility: CLINIC | Age: 64
End: 2024-05-20
Payer: COMMERCIAL

## 2024-05-20 DIAGNOSIS — R22.41 LOCALIZED SWELLING OF RIGHT FOOT: Primary | ICD-10-CM

## 2024-05-21 PROBLEM — R40.0 DAYTIME SOMNOLENCE: Status: ACTIVE | Noted: 2024-05-21

## 2024-05-21 PROBLEM — M25.539 PAIN IN WRIST: Status: ACTIVE | Noted: 2024-05-21

## 2024-05-21 PROBLEM — M79.643 PAIN OF HAND: Status: ACTIVE | Noted: 2018-02-20

## 2024-05-21 PROBLEM — M72.2 PLANTAR FASCIITIS: Status: ACTIVE | Noted: 2024-05-21

## 2024-05-21 PROBLEM — S99.911A INJURY OF RIGHT ANKLE: Status: ACTIVE | Noted: 2024-05-21

## 2024-05-21 PROBLEM — A60.00 GENITAL HERPES SIMPLEX: Status: ACTIVE | Noted: 2024-05-21

## 2024-05-21 PROBLEM — G57.00 PIRIFORMIS SYNDROME: Status: ACTIVE | Noted: 2023-04-06

## 2024-05-21 PROBLEM — B37.31 CANDIDIASIS OF VAGINA: Status: ACTIVE | Noted: 2024-05-21

## 2024-05-21 PROBLEM — M25.579 CHRONIC ANKLE PAIN: Status: ACTIVE | Noted: 2024-05-21

## 2024-05-21 PROBLEM — M70.60 GREATER TROCHANTERIC BURSITIS: Status: ACTIVE | Noted: 2024-05-21

## 2024-05-21 PROBLEM — L02.31 ABSCESS OF BUTTOCK: Status: ACTIVE | Noted: 2020-09-28

## 2024-05-21 PROBLEM — M77.11 LATERAL EPICONDYLITIS OF RIGHT ELBOW: Status: ACTIVE | Noted: 2023-04-06

## 2024-05-21 PROBLEM — L29.9 ITCHING: Status: ACTIVE | Noted: 2024-05-21

## 2024-05-21 PROBLEM — J34.89 NASAL DISCHARGE: Status: ACTIVE | Noted: 2024-05-21

## 2024-05-21 PROBLEM — R06.09 DYSPNEA ON EXERTION: Status: ACTIVE | Noted: 2024-05-21

## 2024-05-21 PROBLEM — S99.929A INJURY OF FOOT: Status: ACTIVE | Noted: 2024-05-21

## 2024-05-21 PROBLEM — D31.00 NEVUS OF CONJUNCTIVA: Status: ACTIVE | Noted: 2023-04-06

## 2024-05-21 PROBLEM — K55.9 ISCHEMIC COLITIS (MULTI): Status: ACTIVE | Noted: 2024-05-21

## 2024-05-21 PROBLEM — M79.604 PAIN OF RIGHT LOWER EXTREMITY: Status: ACTIVE | Noted: 2024-05-17

## 2024-05-21 PROBLEM — B95.8 STAPHYLOCOCCUS INFECTION: Status: ACTIVE | Noted: 2024-05-21

## 2024-05-21 PROBLEM — R19.7 DIARRHEA: Status: ACTIVE | Noted: 2024-05-21

## 2024-05-21 PROBLEM — H05.20 EXOPHTHALMOS: Status: ACTIVE | Noted: 2018-02-20

## 2024-05-21 PROBLEM — G47.33 OBSTRUCTIVE SLEEP APNEA SYNDROME: Status: ACTIVE | Noted: 2018-02-20

## 2024-05-21 PROBLEM — K46.9 ABDOMINAL HERNIA: Status: ACTIVE | Noted: 2024-05-21

## 2024-05-21 PROBLEM — Z86.19 HISTORY OF VIRAL INFECTION: Status: ACTIVE | Noted: 2024-05-21

## 2024-05-21 PROBLEM — N32.89 MASS OF URINARY BLADDER: Status: ACTIVE | Noted: 2023-04-06

## 2024-05-21 PROBLEM — G56.00 CARPAL TUNNEL SYNDROME: Status: ACTIVE | Noted: 2023-04-06

## 2024-05-21 PROBLEM — K52.9 ACUTE GASTROENTERITIS: Status: ACTIVE | Noted: 2024-05-21

## 2024-05-21 PROBLEM — J20.9 ACUTE BRONCHITIS: Status: ACTIVE | Noted: 2024-05-21

## 2024-05-21 PROBLEM — I73.00 RAYNAUD'S DISEASE: Status: ACTIVE | Noted: 2024-05-21

## 2024-05-21 PROBLEM — D37.8 NEOPLASM OF UNCERTAIN BEHAVIOR OF TAIL OF PANCREAS: Status: ACTIVE | Noted: 2024-05-21

## 2024-05-21 PROBLEM — M67.919 DISORDER OF TENDON OF SHOULDER REGION: Status: ACTIVE | Noted: 2024-05-21

## 2024-05-21 PROBLEM — M65.4 RADIAL STYLOID TENOSYNOVITIS: Status: ACTIVE | Noted: 2024-05-21

## 2024-05-21 PROBLEM — S16.1XXA STRAIN OF NECK MUSCLE: Status: ACTIVE | Noted: 2024-05-21

## 2024-05-21 PROBLEM — N39.41 URGE INCONTINENCE OF URINE: Status: ACTIVE | Noted: 2024-05-21

## 2024-05-21 PROBLEM — R05.9 COUGH: Status: ACTIVE | Noted: 2024-05-21

## 2024-05-21 PROBLEM — A59.01 TRICHOMONAL VULVOVAGINITIS: Status: ACTIVE | Noted: 2024-05-21

## 2024-05-21 PROBLEM — I89.0 LYMPHEDEMA: Status: ACTIVE | Noted: 2024-05-21

## 2024-05-21 PROBLEM — L98.9 INFLAMMATORY DERMATOSIS: Status: ACTIVE | Noted: 2024-05-21

## 2024-05-21 PROBLEM — R93.89 ABNORMAL COMPUTERIZED AXIAL TOMOGRAPHY OF CHEST: Status: ACTIVE | Noted: 2024-05-21

## 2024-05-21 PROBLEM — L21.0 SEBORRHEA CAPITIS: Status: ACTIVE | Noted: 2023-04-06

## 2024-05-21 PROBLEM — F09 MILD COGNITIVE DISORDER: Status: ACTIVE | Noted: 2023-04-06

## 2024-05-21 PROBLEM — I88.9 CERVICAL LYMPHADENITIS: Status: ACTIVE | Noted: 2023-04-06

## 2024-05-21 PROBLEM — H18.20 CORNEAL EDEMA: Status: ACTIVE | Noted: 2024-05-21

## 2024-05-21 PROBLEM — R73.03 PREDIABETES: Status: ACTIVE | Noted: 2024-05-21

## 2024-05-21 PROBLEM — R06.83 SNORING: Status: ACTIVE | Noted: 2024-05-21

## 2024-05-21 PROBLEM — G80.9 CEREBRAL PALSY (MULTI): Status: ACTIVE | Noted: 2024-05-21

## 2024-05-21 PROBLEM — I50.9 CONGESTIVE HEART FAILURE (MULTI): Status: ACTIVE | Noted: 2018-02-20

## 2024-05-21 PROBLEM — M25.519 SHOULDER PAIN: Status: ACTIVE | Noted: 2018-02-20

## 2024-05-21 PROBLEM — G47.00 INSOMNIA: Status: ACTIVE | Noted: 2024-05-21

## 2024-05-21 PROBLEM — M75.100 TEAR OF ROTATOR CUFF: Status: ACTIVE | Noted: 2018-02-20

## 2024-05-21 PROBLEM — J32.9 SINUSITIS: Status: ACTIVE | Noted: 2024-05-21

## 2024-05-21 PROBLEM — F43.29 ADJUSTMENT DISORDER WITH MIXED EMOTIONAL FEATURES: Status: ACTIVE | Noted: 2024-05-21

## 2024-05-21 PROBLEM — J98.01 BRONCHOSPASM: Status: ACTIVE | Noted: 2024-05-21

## 2024-05-21 PROBLEM — G56.20 ULNAR NEUROPATHY: Status: ACTIVE | Noted: 2023-04-06

## 2024-05-21 PROBLEM — D68.59 PROTEIN S DEFICIENCY (MULTI): Status: ACTIVE | Noted: 2023-06-13

## 2024-05-21 PROBLEM — M25.569 KNEE PAIN: Status: ACTIVE | Noted: 2024-05-21

## 2024-05-21 PROBLEM — N93.9 ABNORMAL UTERINE BLEEDING: Status: ACTIVE | Noted: 2024-05-21

## 2024-05-21 PROBLEM — K57.32 DIVERTICULITIS OF COLON: Status: ACTIVE | Noted: 2024-05-21

## 2024-05-21 PROBLEM — M79.18 PAIN IN BUTTOCK: Status: ACTIVE | Noted: 2024-05-21

## 2024-05-21 PROBLEM — R61 EXCESSIVE SWEATING: Status: ACTIVE | Noted: 2024-05-21

## 2024-05-21 PROBLEM — N89.8 VAGINAL DISCHARGE: Status: ACTIVE | Noted: 2024-05-21

## 2024-05-21 PROBLEM — D12.6 ADENOMATOUS POLYP OF COLON: Status: ACTIVE | Noted: 2024-05-21

## 2024-05-21 PROBLEM — R51.9 HEADACHE: Status: ACTIVE | Noted: 2024-05-21

## 2024-05-21 PROBLEM — J30.9 ALLERGIC RHINITIS: Status: ACTIVE | Noted: 2023-04-06

## 2024-05-21 PROBLEM — N95.1 HOT FLASHES DUE TO MENOPAUSE: Status: ACTIVE | Noted: 2023-04-06

## 2024-05-21 PROBLEM — G89.29 CHRONIC ANKLE PAIN: Status: ACTIVE | Noted: 2024-05-21

## 2024-05-21 PROBLEM — H04.129 TEAR FILM INSUFFICIENCY: Status: ACTIVE | Noted: 2018-02-20

## 2024-05-21 PROBLEM — N92.1 METRORRHAGIA: Status: ACTIVE | Noted: 2024-05-21

## 2024-05-21 PROBLEM — G47.10 HYPERSOMNIA: Status: ACTIVE | Noted: 2023-06-13

## 2024-05-21 PROBLEM — R11.0 NAUSEA: Status: ACTIVE | Noted: 2024-05-21

## 2024-05-21 PROBLEM — M43.10 ACQUIRED SPONDYLOLISTHESIS: Status: ACTIVE | Noted: 2023-04-06

## 2024-05-21 PROBLEM — H04.123 DRY EYES: Status: ACTIVE | Noted: 2024-05-21

## 2024-05-21 PROBLEM — R20.0 NUMBNESS OF HAND: Status: ACTIVE | Noted: 2024-05-21

## 2024-05-21 PROBLEM — T81.49XA SUPERFICIAL POSTOPERATIVE WOUND INFECTION: Status: ACTIVE | Noted: 2024-05-21

## 2024-05-21 PROBLEM — M79.89 SWELLING OF LOWER EXTREMITY: Status: ACTIVE | Noted: 2024-05-21

## 2024-05-21 PROBLEM — M54.12 CERVICAL RADICULOPATHY: Status: ACTIVE | Noted: 2024-05-21

## 2024-05-21 PROBLEM — M71.22 SYNOVIAL CYST OF LEFT KNEE: Status: ACTIVE | Noted: 2023-04-06

## 2024-05-21 PROBLEM — M25.559 ARTHRALGIA OF HIP: Status: ACTIVE | Noted: 2024-05-21

## 2024-05-21 PROBLEM — R00.2 PALPITATIONS: Status: ACTIVE | Noted: 2024-05-21

## 2024-05-21 PROBLEM — R29.898 WEAKNESS OF LEFT HAND: Status: ACTIVE | Noted: 2024-05-21

## 2024-05-22 ENCOUNTER — APPOINTMENT (OUTPATIENT)
Dept: CARDIOLOGY | Facility: CLINIC | Age: 64
End: 2024-05-22
Payer: COMMERCIAL

## 2024-05-22 ENCOUNTER — ANTICOAGULATION - WARFARIN VISIT (OUTPATIENT)
Dept: CARDIOLOGY | Facility: CLINIC | Age: 64
End: 2024-05-22
Payer: COMMERCIAL

## 2024-05-22 DIAGNOSIS — Z86.711 HISTORY OF PULMONARY EMBOLUS (PE): Primary | ICD-10-CM

## 2024-05-22 NOTE — PROGRESS NOTES
Patient identification verified with 2 identifiers.    Location: [facility] Patient Self-Testing Program [phone]    Referring Physician: DR. BEARD  Enrollment/ Re-enrollment date: 24   INR Goal: 2.0-3.0  INR monitoring is per [facility] protocol.  Anticoagulation Medication: warfarin  Indication: Pulmonary Embolism (PE)    Subjective   Bleeding signs/symptoms: No    Bruising: No   Major bleeding event: No  Thrombosis signs/symptoms: No  Thromboembolic event: No  Missed doses: Yes  PT MISSED A DOSE OF WARFARIN ON .  Extra doses: No  Medication changes: No  Dietary changes: No  Change in health: No  Change in activity: No  Alcohol: No  Other concerns: No    Upcoming Procedures:  Does the Patient Have any upcoming procedures that require interruption in anticoagulation therapy? no  Does the patient require bridging? no      Anticoagulation Summary  As of 2024      INR goal:  2.0-3.0   TTR:  36.4% (7.7 mo)   INR used for dosin.00 (2024)   Weekly warfarin total:  40 mg               Assessment/Plan   Subtherapeutic     1. New dose:  PT WILL INCREASE WEEKLY DOSE SCHEDULE.       2. Next INR:  6 DAYS  I SPOKE TO PT CONFIRMING CURRENT WARFARIN DOSING.   PT WILL INCREASE  WEEKLY DOSE SCHEDULE AND RETEST IN 6 DAYS.  PT VERBALIZED UNDERSTANDING OF THESE DOSING INSTRUCTIONS.        Education provided to patient during the visit:  Patient instructed to call in interim with questions, concerns and changes.   Patient educated on signs of bleeding/clotting.   Patient educated on compliance with dosing, follow up appointments, and prescribed plan of care.

## 2024-05-28 ENCOUNTER — APPOINTMENT (OUTPATIENT)
Dept: CARDIOLOGY | Facility: CLINIC | Age: 64
End: 2024-05-28
Payer: COMMERCIAL

## 2024-05-28 ENCOUNTER — ANTICOAGULATION - WARFARIN VISIT (OUTPATIENT)
Dept: CARDIOLOGY | Facility: CLINIC | Age: 64
End: 2024-05-28

## 2024-05-28 DIAGNOSIS — Z86.711 HISTORY OF PULMONARY EMBOLUS (PE): Primary | ICD-10-CM

## 2024-05-28 NOTE — PROGRESS NOTES
Patient identification verified with 2 identifiers.    Location: [facility] Patient Self-Testing Program [phone]    Referring Physician: Adamaris Nguyen MD  Enrollment/ Re-enrollment date: 2024   INR Goal: 2.0-3.0  INR monitoring is per [facility] protocol.  Anticoagulation Medication: warfarin  Indication: Pulmonary Embolism (PE)    Subjective   Bleeding signs/symptoms: No    Bruising: No   Major bleeding event: No  Thrombosis signs/symptoms: No  Thromboembolic event: No  Missed doses: No  Extra doses: No  Medication changes: No  Dietary changes: No  Change in health: No  Change in activity: No  Alcohol: No  Other concerns: No    Upcoming Procedures:  Does the Patient Have any upcoming procedures that require interruption in anticoagulation therapy? no  Does the patient require bridging? no      Anticoagulation Summary  As of 2024      INR goal:  2.0-3.0   TTR:  36.2% (7.9 mo)   INR used for dosin.40 (2024)   Weekly warfarin total:  40 mg               Assessment/Plan   Therapeutic     1. New dose: no change    2. Next INR: 1 week      Education provided to patient during the visit:  Patient instructed to call in interim with questions, concerns and changes.

## 2024-06-04 ENCOUNTER — OFFICE VISIT (OUTPATIENT)
Dept: CARDIOLOGY | Facility: CLINIC | Age: 64
End: 2024-06-04
Payer: MEDICARE

## 2024-06-04 VITALS
BODY MASS INDEX: 35.12 KG/M2 | HEIGHT: 61 IN | OXYGEN SATURATION: 98 % | DIASTOLIC BLOOD PRESSURE: 100 MMHG | SYSTOLIC BLOOD PRESSURE: 180 MMHG | HEART RATE: 71 BPM | WEIGHT: 186 LBS

## 2024-06-04 DIAGNOSIS — I50.22 CHRONIC SYSTOLIC CONGESTIVE HEART FAILURE (MULTI): Primary | ICD-10-CM

## 2024-06-04 DIAGNOSIS — E78.2 MIXED HYPERLIPIDEMIA: ICD-10-CM

## 2024-06-04 DIAGNOSIS — Z86.711 HISTORY OF PULMONARY EMBOLUS (PE): ICD-10-CM

## 2024-06-04 DIAGNOSIS — I50.32 CHRONIC HEART FAILURE WITH PRESERVED EJECTION FRACTION (MULTI): ICD-10-CM

## 2024-06-04 DIAGNOSIS — I20.89 STABLE ANGINA (CMS-HCC): ICD-10-CM

## 2024-06-04 PROCEDURE — 3044F HG A1C LEVEL LT 7.0%: CPT | Performed by: STUDENT IN AN ORGANIZED HEALTH CARE EDUCATION/TRAINING PROGRAM

## 2024-06-04 PROCEDURE — 99215 OFFICE O/P EST HI 40 MIN: CPT | Performed by: STUDENT IN AN ORGANIZED HEALTH CARE EDUCATION/TRAINING PROGRAM

## 2024-06-04 PROCEDURE — 1036F TOBACCO NON-USER: CPT | Performed by: STUDENT IN AN ORGANIZED HEALTH CARE EDUCATION/TRAINING PROGRAM

## 2024-06-04 PROCEDURE — 3048F LDL-C <100 MG/DL: CPT | Performed by: STUDENT IN AN ORGANIZED HEALTH CARE EDUCATION/TRAINING PROGRAM

## 2024-06-04 PROCEDURE — 93000 ELECTROCARDIOGRAM COMPLETE: CPT | Performed by: STUDENT IN AN ORGANIZED HEALTH CARE EDUCATION/TRAINING PROGRAM

## 2024-06-04 PROCEDURE — 3061F NEG MICROALBUMINURIA REV: CPT | Performed by: STUDENT IN AN ORGANIZED HEALTH CARE EDUCATION/TRAINING PROGRAM

## 2024-06-04 PROCEDURE — 3008F BODY MASS INDEX DOCD: CPT | Performed by: STUDENT IN AN ORGANIZED HEALTH CARE EDUCATION/TRAINING PROGRAM

## 2024-06-04 PROCEDURE — 3080F DIAST BP >= 90 MM HG: CPT | Performed by: STUDENT IN AN ORGANIZED HEALTH CARE EDUCATION/TRAINING PROGRAM

## 2024-06-04 PROCEDURE — 3077F SYST BP >= 140 MM HG: CPT | Performed by: STUDENT IN AN ORGANIZED HEALTH CARE EDUCATION/TRAINING PROGRAM

## 2024-06-04 RX ORDER — METOPROLOL TARTRATE 25 MG/1
25 TABLET, FILM COATED ORAL 2 TIMES DAILY
Qty: 60 TABLET | Refills: 11 | Status: SHIPPED | OUTPATIENT
Start: 2024-06-04 | End: 2025-06-04

## 2024-06-04 NOTE — PROGRESS NOTES
[facility] Note    Date: 06/04/24  Time: 10:32 PM    Subjective   Marni Lugo is a 63 y.o. female who is coming to establish care.  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  Patient was previously discussed this is from Coumadin to alternate DOAC and patient requesting the same today.  The patient reports that she has had chest pain that started months ago last episode of chest discomfort was around 3 weeks ago.  She reports chest tightness with activity/playing with kids and relieved after resting.  She has cut down her activity due to same.        Review of Systems:  Otherwise, limited cardiovascular review of systems is negative.        Medical History:   She has a past medical history of Abnormal finding of blood chemistry, unspecified (04/14/2014), Acute bronchospasm (04/18/2016), Acute candidiasis of vulva and vagina (07/21/2013), Acute embolism and thrombosis of unspecified vein (02/18/2014), Acute vaginitis (02/11/2016), Adjustment disorder with mixed anxiety and depressed mood (04/06/2023), Age-related nuclear cataract, bilateral (10/18/2022), Anesthesia of skin (03/16/2018), Bitten or stung by nonvenomous insect and other nonvenomous arthropods, initial encounter (09/01/2016), Carpal tunnel syndrome (04/06/2023), Cervical lymphadenitis (04/06/2023), Chronic pain of left ankle (04/06/2023), Diverticulitis of large intestine without perforation or abscess without bleeding (07/21/2013), Dry eye syndrome of unspecified lacrimal gland (10/18/2022), Encounter for follow-up examination after completed treatment for conditions other than malignant neoplasm (11/18/2015), Encounter for immunization (02/20/2016), Encounter for other preprocedural examination (10/09/2015), Encounter for screening for infections with a predominantly sexual mode of transmission (03/16/2018), Encounter for  screening for infections with a predominantly sexual mode of transmission (02/28/2018), Encounter for screening for infections with a predominantly sexual mode of transmission (02/28/2018), Encounter for screening for malignant neoplasm of colon (11/23/2020), Excessive and frequent menstruation with irregular cycle (07/21/2013), Facial cellulitis (05/17/2023), Follicular disorder, unspecified (04/02/2021), Hemorrhage of anus and rectum (05/09/2014), Hot flashes, menopausal (04/06/2023), Hyperlipidemia, unspecified (07/21/2013), Impetigo, unspecified (04/02/2021), Incarcerated umbilical hernia (04/06/2023), Inflamed seborrheic keratosis (04/02/2021), Lateral epicondylitis, unspecified elbow (03/16/2018), Lateral epicondylitis, unspecified elbow (02/28/2018), Lateral epicondylitis, unspecified elbow (02/28/2018), Left sided colitis without complications (Multi) (07/21/2013), Lumbosacral spondylosis (04/06/2023), Myopia, bilateral (10/18/2022), Nevus of left conjunctiva (04/06/2023), Obstructive sleep apnea (adult) (pediatric) (07/21/2013), Other chest pain (05/08/2019), Other conditions influencing health status (12/28/2020), Other conditions influencing health status (05/08/2019), Other enthesopathies, not elsewhere classified (05/06/2014), Other hypersomnia (11/08/2017), 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), Other specified abnormal findings of blood chemistry (10/07/2016), Other symptoms and signs involving the genitourinary system (11/23/2020), Other symptoms and signs involving the nervous system (04/13/2017), Personal history of diseases of the blood and blood-forming organs and certain disorders involving the immune mechanism (10/18/2022), Personal history of diseases of the skin and subcutaneous tissue, Personal history of other diseases of the circulatory system (07/10/2020), Personal history of other diseases of the female genital tract (10/26/2020), Personal history of other diseases of the  musculoskeletal system and connective tissue (01/16/2018), Personal history of other diseases of the respiratory system (07/21/2013), Personal history of other diseases of the respiratory system (04/14/2014), Personal history of other diseases of urinary system (02/02/2022), Personal history of other diseases of urinary system (11/01/2021), Personal history of other infectious and parasitic diseases (04/14/2014), Personal history of other infectious and parasitic diseases, Personal history of other specified conditions (07/21/2020), Personal history of other specified conditions (01/18/2017), Personal history of other specified conditions (11/23/2020), Personal history of other specified conditions (07/14/2022), Personal history of other specified conditions (10/14/2020), Personal history of other specified conditions (06/18/2016), Personal history of other specified conditions (07/26/2016), Personal history of other specified conditions (06/18/2016), Personal history of peptic ulcer disease, Personal history of pulmonary embolism (08/21/2020), Personal history of urinary (tract) infections (11/05/2015), Piriformis syndrome of right side (04/06/2023), Postnasal drip (04/14/2014), Primary osteoarthritis of first carpometacarpal joint of left hand (04/06/2023), Rash and other nonspecific skin eruption (07/14/2022), Right upper quadrant abdominal tenderness (05/22/2017), Spondylolisthesis, acquired (04/06/2023), Type 2 diabetes mellitus (Multi) (11/21/2022), Type 2 diabetes mellitus without complication, without long-term current use of insulin (Multi) (04/06/2023), Ulcerative blepharitis unspecified eye, unspecified eyelid (09/21/2018), Ulnar neuropathy at elbow of right upper extremity (04/06/2023), Unspecified exophthalmos (07/21/2013), Unspecified exophthalmos (10/18/2022), Urinary tract infection, site not specified (04/14/2014), and Vascular disorder of intestine, unspecified (Multi) (07/21/2013).  Surgical  History:   Past Surgical History:   Procedure Laterality Date    CT ABDOMEN PELVIS ANGIOGRAM W AND/OR WO IV CONTRAST  11/12/2015    CT ABDOMEN PELVIS ANGIOGRAM W AND/OR WO IV CONTRAST 11/12/2015 [facility] CLINICAL LEGACY    CT ABDOMEN PELVIS ANGIOGRAM W AND/OR WO IV CONTRAST  5/8/2022    CT ABDOMEN PELVIS ANGIOGRAM W AND/OR WO IV CONTRAST 5/8/2022 DOCTOR OFFICE LEGACY    CT ABDOMEN PELVIS ANGIOGRAM W AND/OR WO IV CONTRAST  4/1/2023    CT ABDOMEN PELVIS ANGIOGRAM W AND/OR WO IV CONTRAST [facility] CT    HYSTEROSCOPY  05/09/2014    Hysteroscopy With Endometrial Ablation    OTHER SURGICAL HISTORY  04/07/2014    Left Hemicolectomy    TONSILLECTOMY  04/07/2014    Tonsillectomy    TOTAL VAGINAL HYSTERECTOMY  09/11/2014    Vaginal Hysterectomy    TUBAL LIGATION  04/07/2014    Tubal Ligation   PSHP@  Social History:   Social Determinants of Health with Concerns     Tobacco Use: Medium Risk (6/4/2024)    Patient History     Smoking Tobacco Use: Former     Smokeless Tobacco Use: Never     Passive Exposure: Past   Alcohol Use: Not on file   Financial Resource Strain: Not on file   Food Insecurity: Not on file   Transportation Needs: Not on file   Physical Activity: Not on file   Stress: Not on file   Social Connections: Not on file   Intimate Partner Violence: Not on file   Housing Stability: Not on file   Utilities: Not on file   Digital Equity: Not on file   Health Literacy: Not on file     Family History:   Family History   Problem Relation Name Age of Onset    Heart disease Mother      Kidney failure Father      Cirrhosis Sister      Breast cancer Neg Hx        Allergies:  Adhesive and Latex    Outpatient Medications:  Current Outpatient Medications   Medication Instructions    albuterol 90 mcg/actuation inhaler 2 puffs, inhalation, Every 6 hours PRN    albuterol 2.5 mg, nebulization, Every 6 hours PRN    amLODIPine (NORVASC) 10 mg, oral, Daily    apixaban (ELIQUIS) 5 mg, oral, 2 times daily    carbidopa-levodopa (Sinemet)  mg  "tablet 1.5 tablets, oral, Daily, Take 1 and 1/2 (one and one-half) tablets by mouth daily.    empagliflozin (JARDIANCE) 25 mg, oral, Daily    famotidine (PEPCID) 20 mg, oral, 2 times daily    fluticasone (Flonase) 50 mcg/actuation nasal spray SHAKE LIQUID AND USE 1 TO 2 SPRAYS IN EACH NOSTRIL DAILY AS NEEDED FOR SYMPTOMS OR ALLERGY    furosemide (Lasix) 20 mg tablet TAKE 1 TABLET BY MOUTH EVERY DAY AS NEEDED FOR LEG SWELLING    Gemtesa 75 mg, oral, Daily    metoprolol tartrate (LOPRESSOR) 25 mg, oral, 2 times daily    nitroglycerin (NITROSTAT) 0.4 mg, sublingual, Every 5 min PRN    pantoprazole (PROTONIX) 40 mg, oral, Daily    rosuvastatin (CRESTOR) 40 mg, oral, Daily    sildenafil (Revatio) 20 mg tablet 1 tablet, oral, 2 times daily    topiramate (TOPAMAX) 100 mg, oral, Daily    valACYclovir (VALTREX) 500 mg, oral, Daily       Objective     Physical Exam  Vitals:    06/04/24 1416   BP: (!) 180/100   BP Location: Left arm   Patient Position: Sitting   BP Cuff Size: Adult   Pulse: 71   SpO2: 98%   Weight: 84.4 kg (186 lb)   Height: 1.549 m (5' 1\")     Wt Readings from Last 3 Encounters:   06/04/24 84.4 kg (186 lb)   03/27/24 81.2 kg (179 lb)   03/24/24 81.2 kg (179 lb)       General: Alert and Oriented, No distress, cooperative  Head: Normocephalic without obvious abnormality, atraumatic  Eyes: Conjunctiva/corneas clear, EOM's grossly intact  Neck: Supple, trachea midline, No thyroid enlargement/tenderness/nodules; No JVD  Lungs: Clear to auscultation bilaterally, no wheezes, rhonci, or rales. respirations unlabored  Chest Wall: No tenderness or deformity  Heart: Regular rhythm, normal S1/S2, no murmur  Abdomen: Soft, non-tender, Non-distended, bowel sounds active  Extremities: No edema, no cyanosis, no clubbing  Skin: Skin color, texture, turgor normal.  No rashes or lesions noted  Neurologic: Alert and oriented x 3, grossly moving all extremities, speech intact        I have personally reviewed the following images " and laboratory findings:  ECG: RBBB, NSR  Echocardiogram:   Patient Name:      YUMIKO Romano Physician:    [id number] Eduard Martínez MD  Study Date:        3/1/2024             Ordering Provider:    [id number] BETTY HENRIQUEZ                                                                GIDEON  MRN/PID:           [id number]             Fellow:  Accession#:        [id number]         Nurse:  Date of Birth/Age: 1960 / 63 years Sonographer:          Taniya Ceja RDCS  Gender:            F                    Additional Staff:  Height:            154.94 cm            Admit Date:  Weight:            84.82 kg             Admission Status:     Outpatient  BSA / BMI:         1.84 m2 / 35.33      Encounter#:           [id number]                     kg/m2                                          Department Location:  [facility]  Blood Pressure: 118 /68 mmHg     Study Type:    TRANSTHORACIC ECHO (TTE) COMPLETE  Diagnosis/ICD: Chronic systolic (congestive) heart failure (CHF)-I50.22  Indication:    Congestive Heart Failure  CPT Code:      Echo Complete w Full Doppler-39100     Patient History:  Pertinent History: CHF, CKD, HLD, DM2, sarcoidosis, PE.     Study Detail: The following Echo studies were performed: 2D, M-Mode, Doppler and                color flow.        PHYSICIAN INTERPRETATION:  Left Ventricle: The left ventricular systolic function is normal, with an estimated ejection fraction of 65%. There are no regional wall motion abnormalities. The left ventricular cavity size is normal. Spectral Doppler shows an impaired relaxation pattern of left ventricular diastolic filling.  Left Atrium: The left atrium is normal in size.  Right Ventricle: The right ventricle is normal in size. There is normal right ventricular global systolic function.  Right Atrium: The right atrium is normal in size.  Aortic Valve: The aortic valve appears structurally  normal. There is no evidence of aortic valve regurgitation. The peak instantaneous gradient of the aortic valve is 7.4 mmHg. The mean gradient of the aortic valve is 4.1 mmHg.  Mitral Valve: The mitral valve is normal in structure. There is trace mitral valve regurgitation.  Tricuspid Valve: The tricuspid valve is structurally normal. No evidence of tricuspid regurgitation. The right ventricular systolic pressure is unable to be estimated.  Pulmonic Valve: The pulmonic valve is structurally normal. There is trace pulmonic valve regurgitation.  Pericardium: There is a trivial pericardial effusion.  Aorta: The aortic root is normal.  Systemic Veins: The inferior vena cava appears to be of normal size. There is IVC inspiratory collapse greater than 50%.  In comparison to the previous echocardiogram(s): Compared with study from 8/10/2017, no significant change.        CONCLUSIONS:   1. Left ventricular systolic function is normal with a 65% estimated ejection fraction.   2. Spectral Doppler shows an impaired relaxation pattern of left ventricular diastolic filling.    STUDY:  [facility] US LOWER EXTREMITY VENOUS DUPLEX RIGHT  3/24/2024 2:42 pm      INDICATION:  64 y/o   F with  Signs/Symptoms:edema pain. LMP:  Unknown.      COMPARISON:  None.      ACCESSION NUMBER(S):  [id number]      ORDERING CLINICIAN:  IVON FLEMING      TECHNIQUE:  Routine ultrasound of the  right lower extremity was performed with  duplex Doppler (color and spectral) evaluation.   Static images were  obtained for remote interpretation.      FINDINGS:  THIGH VEINS:  The common femoral, femoral, popliteal, proximal medial  saphenous, and deep femoral veins are patent and free of thrombus.  The veins are normally compressible.  They demonstrate normal phasic  flow and augmentation response.      CALF VEINS:  The paired peroneal and posterior tibial calf veins are  patent.      IMPRESSION:  Negative study.  No deep venous thrombosis of the  right  lower  extremity.      MACRO:      Laboratory values:   Anticoagulation - Warfarin Visit on 05/28/2024   Component Date Value    INR External 05/28/2024 2.40    Anticoagulation - Warfarin Visit on 05/22/2024   Component Date Value    INR External 05/22/2024 1.00    Anticoagulation - Warfarin Visit on 05/15/2024   Component Date Value    INR External 05/15/2024 1.60    Anticoagulation - Warfarin Visit on 05/08/2024   Component Date Value    INR External 05/08/2024 2.30    Lab on 05/02/2024   Component Date Value    Glucose 05/02/2024 99     Sodium 05/02/2024 143     Potassium 05/02/2024 3.7     Chloride 05/02/2024 114 (H)     Bicarbonate 05/02/2024 23     Anion Gap 05/02/2024 10     Urea Nitrogen 05/02/2024 12     Creatinine 05/02/2024 0.93     eGFR 05/02/2024 70     Calcium 05/02/2024 9.0     Albumin 05/02/2024 4.2     Alkaline Phosphatase 05/02/2024 103     Total Protein 05/02/2024 7.0     AST 05/02/2024 15     Bilirubin, Total 05/02/2024 0.7     ALT 05/02/2024 12     Hemoglobin A1C 05/02/2024 6.5 (H)     Estimated Average Glucose 05/02/2024 140     Cholesterol 05/02/2024 137     HDL-Cholesterol 05/02/2024 45.0     Cholesterol/HDL Ratio 05/02/2024 3.0     LDL Calculated 05/02/2024 63     VLDL 05/02/2024 29     Triglycerides 05/02/2024 146     Non HDL Cholesterol 05/02/2024 92     Albumin, Urine Random 05/02/2024 15.6     Creatinine, Urine Random 05/02/2024 63.5     Albumin/Creatine Ratio 05/02/2024 24.6    Anticoagulation - Warfarin Visit on 05/01/2024   Component Date Value    INR External 05/01/2024 2.70    Anticoagulation - Warfarin Visit on 04/10/2024   Component Date Value    INR External 04/22/2024 1.70      CBC -  Lab Results   Component Value Date    WBC 5.9 03/24/2024    HGB 15.0 03/24/2024    HCT 45.6 03/24/2024    MCV 87 03/24/2024     03/24/2024       CMP -  Lab Results   Component Value Date    CALCIUM 9.0 05/02/2024    PHOS 3.2 02/28/2020    PROT 7.0 05/02/2024    ALBUMIN 4.2 05/02/2024    AST  15 05/02/2024    ALT 12 05/02/2024    ALKPHOS 103 05/02/2024    BILITOT 0.7 05/02/2024       LIPID PANEL -   Lab Results   Component Value Date    CHOL 137 05/02/2024    HDL 45.0 05/02/2024    CHHDL 3.0 05/02/2024    VLDL 29 05/02/2024    TRIG 146 05/02/2024    NHDL 92 05/02/2024       RENAL FUNCTION PANEL -   Lab Results   Component Value Date    K 3.7 05/02/2024    PHOS 3.2 02/28/2020       Lab Results   Component Value Date    BNP 33 03/24/2024    HGBA1C 6.5 (H) 05/02/2024        Assessment/Plan   Chest pain/stable angina  HFpEF/diastolic dysfunction  Recurrent VTE  Hyperlipidemia    Plan:  -I will order a stress test to evaluate for any reversible ischemia.  -Most recent echocardiogram was reviewed.  Mild diastolic dysfunction was noted.  -I will switch to Eliquis from Coumadin due to patient preference.  Patient needs lifelong anticoagulation for recurrent VTE.  -I will start on metoprolol tartrate 25 mg twice daily for antianginal effect.  -Continue amlodipine 10 mg daily for antihypertensive effects.  -Check BNP levels.  RTC as scheduled  In addition, the following orders were placed today:  Orders Placed This Encounter   Procedures    B-Type Natriuretic Peptide    ECG 12 Lead                 SIGNATURE: Paul He MD PATIENT NAME: Marni Lugo   DATE/TIME: June 4, 2024 10:32 PM MRN: [id number]

## 2024-06-05 ENCOUNTER — ANTICOAGULATION - WARFARIN VISIT (OUTPATIENT)
Dept: CARDIOLOGY | Facility: CLINIC | Age: 64
End: 2024-06-05
Payer: MEDICARE

## 2024-06-05 DIAGNOSIS — Z86.711 HISTORY OF PULMONARY EMBOLUS (PE): Primary | ICD-10-CM

## 2024-06-05 NOTE — PROGRESS NOTES
RECEIVED FAXED INR OF 4.4 TODAY FROM PT. CALLED AND SPOKE TO HER. PT STATES PER HER CARDIOLOGIST DR. GREGORIO, SHE STOPPED WARFARIN AND STARTED ELIQUIS YESTERDAY 6/4/24.

## 2024-06-06 DIAGNOSIS — I50.22 CHRONIC SYSTOLIC CONGESTIVE HEART FAILURE (MULTI): ICD-10-CM

## 2024-06-06 DIAGNOSIS — I20.89 STABLE ANGINA (CMS-HCC): ICD-10-CM

## 2024-06-06 RX ORDER — METOPROLOL TARTRATE 25 MG/1
25 TABLET, FILM COATED ORAL 2 TIMES DAILY
Qty: 180 TABLET | Refills: 3 | OUTPATIENT
Start: 2024-06-06 | End: 2025-06-06

## 2024-06-18 ENCOUNTER — LAB (OUTPATIENT)
Dept: LAB | Facility: LAB | Age: 64
End: 2024-06-18
Payer: MEDICARE

## 2024-06-18 DIAGNOSIS — I50.32 CHRONIC HEART FAILURE WITH PRESERVED EJECTION FRACTION (MULTI): ICD-10-CM

## 2024-06-18 DIAGNOSIS — R22.41 LOCALIZED SWELLING OF RIGHT FOOT: ICD-10-CM

## 2024-06-18 LAB
BASOPHILS # BLD AUTO: 0.03 X10*3/UL (ref 0–0.1)
BASOPHILS NFR BLD AUTO: 0.5 %
EOSINOPHIL # BLD AUTO: 0.03 X10*3/UL (ref 0–0.7)
EOSINOPHIL NFR BLD AUTO: 0.5 %
ERYTHROCYTE [DISTWIDTH] IN BLOOD BY AUTOMATED COUNT: 14 % (ref 11.5–14.5)
ERYTHROCYTE [SEDIMENTATION RATE] IN BLOOD BY WESTERGREN METHOD: 8 MM/H (ref 0–30)
HCT VFR BLD AUTO: 44.9 % (ref 36–46)
HGB BLD-MCNC: 14.8 G/DL (ref 12–16)
IMM GRANULOCYTES # BLD AUTO: 0.01 X10*3/UL (ref 0–0.7)
IMM GRANULOCYTES NFR BLD AUTO: 0.2 % (ref 0–0.9)
LYMPHOCYTES # BLD AUTO: 3.62 X10*3/UL (ref 1.2–4.8)
LYMPHOCYTES NFR BLD AUTO: 58.9 %
MCH RBC QN AUTO: 29.9 PG (ref 26–34)
MCHC RBC AUTO-ENTMCNC: 33 G/DL (ref 32–36)
MCV RBC AUTO: 91 FL (ref 80–100)
MONOCYTES # BLD AUTO: 0.48 X10*3/UL (ref 0.1–1)
MONOCYTES NFR BLD AUTO: 7.8 %
NEUTROPHILS # BLD AUTO: 1.98 X10*3/UL (ref 1.2–7.7)
NEUTROPHILS NFR BLD AUTO: 32.1 %
NRBC BLD-RTO: 0 /100 WBCS (ref 0–0)
PLATELET # BLD AUTO: 271 X10*3/UL (ref 150–450)
RBC # BLD AUTO: 4.95 X10*6/UL (ref 4–5.2)
URATE SERPL-MCNC: 4.3 MG/DL (ref 2.3–6.7)
WBC # BLD AUTO: 6.2 X10*3/UL (ref 4.4–11.3)

## 2024-06-18 PROCEDURE — 85025 COMPLETE CBC W/AUTO DIFF WBC: CPT

## 2024-06-18 PROCEDURE — 83880 ASSAY OF NATRIURETIC PEPTIDE: CPT

## 2024-06-18 PROCEDURE — 85652 RBC SED RATE AUTOMATED: CPT

## 2024-06-18 PROCEDURE — 84550 ASSAY OF BLOOD/URIC ACID: CPT

## 2024-06-18 PROCEDURE — 36415 COLL VENOUS BLD VENIPUNCTURE: CPT

## 2024-06-19 ENCOUNTER — TELEPHONE (OUTPATIENT)
Dept: CARDIOLOGY | Facility: CLINIC | Age: 64
End: 2024-06-19
Payer: MEDICARE

## 2024-06-19 ENCOUNTER — HOSPITAL ENCOUNTER (OUTPATIENT)
Dept: CARDIOLOGY | Facility: HOSPITAL | Age: 64
Discharge: HOME | End: 2024-06-19
Payer: MEDICARE

## 2024-06-19 DIAGNOSIS — I20.89 STABLE ANGINA (CMS-HCC): ICD-10-CM

## 2024-06-19 LAB — BNP SERPL-MCNC: 63 PG/ML (ref 0–99)

## 2024-06-19 PROCEDURE — 93016 CV STRESS TEST SUPVJ ONLY: CPT | Performed by: INTERNAL MEDICINE

## 2024-06-19 PROCEDURE — 93018 CV STRESS TEST I&R ONLY: CPT | Performed by: INTERNAL MEDICINE

## 2024-06-19 PROCEDURE — 93017 CV STRESS TEST TRACING ONLY: CPT

## 2024-06-19 PROCEDURE — 2500000004 HC RX 250 GENERAL PHARMACY W/ HCPCS (ALT 636 FOR OP/ED): Performed by: STUDENT IN AN ORGANIZED HEALTH CARE EDUCATION/TRAINING PROGRAM

## 2024-06-19 PROCEDURE — 93350 STRESS TTE ONLY: CPT | Performed by: INTERNAL MEDICINE

## 2024-06-19 NOTE — TELEPHONE ENCOUNTER
Patient left a  for the office stating she is experiencing some side effects from her metoprolol including fluctuating body temperature and headaches. Message sent to Jake [facility] for return call.    - Manuel TIJERINA

## 2024-06-20 ENCOUNTER — TELEPHONE (OUTPATIENT)
Dept: CARDIOLOGY | Facility: CLINIC | Age: 64
End: 2024-06-20

## 2024-06-20 ENCOUNTER — APPOINTMENT (OUTPATIENT)
Dept: NEPHROLOGY | Facility: CLINIC | Age: 64
End: 2024-06-20
Payer: MEDICARE

## 2024-06-20 NOTE — TELEPHONE ENCOUNTER
6/20/24  1600  Returned patient phone call who reported the headaches are not so bad as she in on migraine medication, but still has the hot flashes; she also remembered she was on metoprolol at one time but a previous provider took her off the metoprolol.    Nurse told he would inform Dr. He and see if a med change is an option.    Patient verbalized understanding.      Will message Dr. He.    6/20/24  1534  Called patient; no answer. Left voice message for patient to return call and discuss metoprolol and side effects.      ----- Message from Katharina Rollins sent at 6/19/2024  2:09 PM EDT -----  Regarding: Dr. He - Metoprolol Side Effects  Carlos Joseph - patient of Dr. He's left a VM stating she thinks she is experiencing some side effects from her metoprolol that she was prescribed on 6/4. She stated her body temperature is fluctuating a lot and she is having headaches. She would greatly appreciate a call back to discuss these symptoms and what her options are. Thank you

## 2024-06-22 ENCOUNTER — APPOINTMENT (OUTPATIENT)
Dept: CARDIOLOGY | Facility: HOSPITAL | Age: 64
End: 2024-06-22
Payer: MEDICARE

## 2024-06-22 ENCOUNTER — HOSPITAL ENCOUNTER (EMERGENCY)
Facility: HOSPITAL | Age: 64
Discharge: HOME | End: 2024-06-22
Attending: GENERAL PRACTICE
Payer: MEDICARE

## 2024-06-22 ENCOUNTER — APPOINTMENT (OUTPATIENT)
Dept: RADIOLOGY | Facility: HOSPITAL | Age: 64
End: 2024-06-22
Payer: MEDICARE

## 2024-06-22 VITALS
TEMPERATURE: 99.9 F | HEART RATE: 85 BPM | HEIGHT: 61 IN | RESPIRATION RATE: 12 BRPM | BODY MASS INDEX: 33.99 KG/M2 | WEIGHT: 180 LBS | OXYGEN SATURATION: 98 % | DIASTOLIC BLOOD PRESSURE: 113 MMHG | SYSTOLIC BLOOD PRESSURE: 150 MMHG

## 2024-06-22 DIAGNOSIS — B34.9 VIRAL ILLNESS: ICD-10-CM

## 2024-06-22 DIAGNOSIS — R07.9 CHEST PAIN, UNSPECIFIED TYPE: Primary | ICD-10-CM

## 2024-06-22 LAB
ALBUMIN SERPL BCP-MCNC: 4 G/DL (ref 3.4–5)
ALP SERPL-CCNC: 98 U/L (ref 33–136)
ALT SERPL W P-5'-P-CCNC: 18 U/L (ref 7–45)
ANION GAP SERPL CALC-SCNC: 13 MMOL/L (ref 10–20)
APPEARANCE UR: CLEAR
AST SERPL W P-5'-P-CCNC: 20 U/L (ref 9–39)
BASOPHILS # BLD AUTO: 0.03 X10*3/UL (ref 0–0.1)
BASOPHILS NFR BLD AUTO: 0.8 %
BILIRUB SERPL-MCNC: 1 MG/DL (ref 0–1.2)
BILIRUB UR STRIP.AUTO-MCNC: NEGATIVE MG/DL
BUN SERPL-MCNC: 12 MG/DL (ref 6–23)
CALCIUM SERPL-MCNC: 9.1 MG/DL (ref 8.6–10.3)
CARDIAC TROPONIN I PNL SERPL HS: 7 NG/L (ref 0–13)
CARDIAC TROPONIN I PNL SERPL HS: 8 NG/L (ref 0–13)
CHLORIDE SERPL-SCNC: 109 MMOL/L (ref 98–107)
CO2 SERPL-SCNC: 21 MMOL/L (ref 21–32)
COLOR UR: ABNORMAL
CREAT SERPL-MCNC: 1.19 MG/DL (ref 0.5–1.05)
EGFRCR SERPLBLD CKD-EPI 2021: 52 ML/MIN/1.73M*2
EOSINOPHIL # BLD AUTO: 0.01 X10*3/UL (ref 0–0.7)
EOSINOPHIL NFR BLD AUTO: 0.3 %
ERYTHROCYTE [DISTWIDTH] IN BLOOD BY AUTOMATED COUNT: 13.7 % (ref 11.5–14.5)
FLUAV RNA RESP QL NAA+PROBE: NOT DETECTED
FLUBV RNA RESP QL NAA+PROBE: NOT DETECTED
GLUCOSE SERPL-MCNC: 102 MG/DL (ref 74–99)
GLUCOSE UR STRIP.AUTO-MCNC: ABNORMAL MG/DL
HCT VFR BLD AUTO: 48 % (ref 36–46)
HGB BLD-MCNC: 16.2 G/DL (ref 12–16)
IMM GRANULOCYTES # BLD AUTO: 0.01 X10*3/UL (ref 0–0.7)
IMM GRANULOCYTES NFR BLD AUTO: 0.3 % (ref 0–0.9)
KETONES UR STRIP.AUTO-MCNC: NEGATIVE MG/DL
LACTATE SERPL-SCNC: 0.7 MMOL/L (ref 0.4–2)
LEUKOCYTE ESTERASE UR QL STRIP.AUTO: NEGATIVE
LYMPHOCYTES # BLD AUTO: 1.38 X10*3/UL (ref 1.2–4.8)
LYMPHOCYTES NFR BLD AUTO: 36.9 %
MCH RBC QN AUTO: 29.4 PG (ref 26–34)
MCHC RBC AUTO-ENTMCNC: 33.8 G/DL (ref 32–36)
MCV RBC AUTO: 87 FL (ref 80–100)
MONOCYTES # BLD AUTO: 0.39 X10*3/UL (ref 0.1–1)
MONOCYTES NFR BLD AUTO: 10.4 %
MUCOUS THREADS #/AREA URNS AUTO: NORMAL /LPF
NEUTROPHILS # BLD AUTO: 1.92 X10*3/UL (ref 1.2–7.7)
NEUTROPHILS NFR BLD AUTO: 51.3 %
NITRITE UR QL STRIP.AUTO: NEGATIVE
NRBC BLD-RTO: 0 /100 WBCS (ref 0–0)
PH UR STRIP.AUTO: 6 [PH]
PLATELET # BLD AUTO: 229 X10*3/UL (ref 150–450)
POTASSIUM SERPL-SCNC: 3.7 MMOL/L (ref 3.5–5.3)
PROT SERPL-MCNC: 6.9 G/DL (ref 6.4–8.2)
PROT UR STRIP.AUTO-MCNC: ABNORMAL MG/DL
RBC # BLD AUTO: 5.51 X10*6/UL (ref 4–5.2)
RBC # UR STRIP.AUTO: ABNORMAL /UL
RBC #/AREA URNS AUTO: NORMAL /HPF
SARS-COV-2 RNA RESP QL NAA+PROBE: NOT DETECTED
SODIUM SERPL-SCNC: 139 MMOL/L (ref 136–145)
SP GR UR STRIP.AUTO: 1.01
UROBILINOGEN UR STRIP.AUTO-MCNC: NORMAL MG/DL
WBC # BLD AUTO: 3.7 X10*3/UL (ref 4.4–11.3)
WBC #/AREA URNS AUTO: NORMAL /HPF

## 2024-06-22 PROCEDURE — 71046 X-RAY EXAM CHEST 2 VIEWS: CPT | Mod: FOREIGN READ | Performed by: RADIOLOGY

## 2024-06-22 PROCEDURE — 93005 ELECTROCARDIOGRAM TRACING: CPT

## 2024-06-22 PROCEDURE — 2550000001 HC RX 255 CONTRASTS: Performed by: GENERAL PRACTICE

## 2024-06-22 PROCEDURE — 96365 THER/PROPH/DIAG IV INF INIT: CPT | Mod: 59

## 2024-06-22 PROCEDURE — 84484 ASSAY OF TROPONIN QUANT: CPT | Performed by: GENERAL PRACTICE

## 2024-06-22 PROCEDURE — 87040 BLOOD CULTURE FOR BACTERIA: CPT | Mod: 59,AHULAB,91

## 2024-06-22 PROCEDURE — 71275 CT ANGIOGRAPHY CHEST: CPT

## 2024-06-22 PROCEDURE — 36415 COLL VENOUS BLD VENIPUNCTURE: CPT | Performed by: GENERAL PRACTICE

## 2024-06-22 PROCEDURE — 80053 COMPREHEN METABOLIC PANEL: CPT | Performed by: GENERAL PRACTICE

## 2024-06-22 PROCEDURE — 71275 CT ANGIOGRAPHY CHEST: CPT | Performed by: RADIOLOGY

## 2024-06-22 PROCEDURE — 93971 EXTREMITY STUDY: CPT

## 2024-06-22 PROCEDURE — 36415 COLL VENOUS BLD VENIPUNCTURE: CPT

## 2024-06-22 PROCEDURE — 81003 URINALYSIS AUTO W/O SCOPE: CPT

## 2024-06-22 PROCEDURE — 2500000004 HC RX 250 GENERAL PHARMACY W/ HCPCS (ALT 636 FOR OP/ED): Performed by: GENERAL PRACTICE

## 2024-06-22 PROCEDURE — 87636 SARSCOV2 & INF A&B AMP PRB: CPT | Performed by: GENERAL PRACTICE

## 2024-06-22 PROCEDURE — 84484 ASSAY OF TROPONIN QUANT: CPT | Mod: 91 | Performed by: GENERAL PRACTICE

## 2024-06-22 PROCEDURE — 99285 EMERGENCY DEPT VISIT HI MDM: CPT | Mod: 25

## 2024-06-22 PROCEDURE — 85025 COMPLETE CBC W/AUTO DIFF WBC: CPT | Performed by: GENERAL PRACTICE

## 2024-06-22 PROCEDURE — 83605 ASSAY OF LACTIC ACID: CPT

## 2024-06-22 PROCEDURE — 74177 CT ABD & PELVIS W/CONTRAST: CPT | Performed by: RADIOLOGY

## 2024-06-22 PROCEDURE — 71046 X-RAY EXAM CHEST 2 VIEWS: CPT

## 2024-06-22 PROCEDURE — 2500000004 HC RX 250 GENERAL PHARMACY W/ HCPCS (ALT 636 FOR OP/ED)

## 2024-06-22 PROCEDURE — 93971 EXTREMITY STUDY: CPT | Mod: FOREIGN READ | Performed by: RADIOLOGY

## 2024-06-22 PROCEDURE — 74177 CT ABD & PELVIS W/CONTRAST: CPT

## 2024-06-22 PROCEDURE — 96367 TX/PROPH/DG ADDL SEQ IV INF: CPT

## 2024-06-22 RX ORDER — ACETAMINOPHEN 325 MG/1
975 TABLET ORAL ONCE
Status: COMPLETED | OUTPATIENT
Start: 2024-06-22 | End: 2024-06-22

## 2024-06-22 RX ADMIN — PIPERACILLIN SODIUM AND TAZOBACTAM SODIUM 4.5 G: 4; .5 INJECTION, SOLUTION INTRAVENOUS at 15:17

## 2024-06-22 RX ADMIN — ACETAMINOPHEN 975 MG: 325 TABLET ORAL at 17:27

## 2024-06-22 RX ADMIN — IOHEXOL 75 ML: 350 INJECTION, SOLUTION INTRAVENOUS at 18:40

## 2024-06-22 RX ADMIN — VANCOMYCIN HYDROCHLORIDE 1500 MG: 1.5 INJECTION, POWDER, LYOPHILIZED, FOR SOLUTION INTRAVENOUS at 17:14

## 2024-06-22 ASSESSMENT — PAIN SCALES - GENERAL
PAINLEVEL_OUTOF10: 8
PAINLEVEL_OUTOF10: 0 - NO PAIN

## 2024-06-22 ASSESSMENT — PAIN DESCRIPTION - PAIN TYPE: TYPE: ACUTE PAIN

## 2024-06-22 ASSESSMENT — PAIN - FUNCTIONAL ASSESSMENT: PAIN_FUNCTIONAL_ASSESSMENT: 0-10

## 2024-06-22 ASSESSMENT — PAIN DESCRIPTION - LOCATION: LOCATION: ABDOMEN

## 2024-06-22 ASSESSMENT — PAIN DESCRIPTION - ORIENTATION: ORIENTATION: LEFT

## 2024-06-22 NOTE — ED PROVIDER NOTES
CC: Chest Pain     HPI:  Marni Lugo is a 63 y.o. 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  Patient cannot recall what she was doing when the pain started, but reports it was initially 8/10.  The pain does not radiate, and she reports no increasing shortness of breath during this time.  She took metoprolol and nitroglycerin yesterday after the pain started, with no significant improvement.  However today, she reports her pain is somewhat improved at 6/10, and also reports pain in her left side, as well as an episode of urinary incontinence.  She reports no back pain, no saddle anesthesia, no weakness/numbness of her lower extremities.  She reports R>L lower leg swelling, and pain in the right calf when walking.    On chart review, patient had a stress test 6/4/2024 which showed no evidence of ischemia, however was noted to be at submaximal stress.    Limitations to History: none  Additional History provided by: N/A    External Records Reviewed:  Recent available ED and inpatient notes reviewed in EMR.    PMHx/PSHx:  Per HPI.   - has a past medical history of Abnormal finding of blood chemistry, unspecified (04/14/2014), Acute bronchospasm (04/18/2016), Acute candidiasis of vulva and vagina (07/21/2013), Acute embolism and thrombosis of unspecified vein (02/18/2014), Acute vaginitis (02/11/2016), Adjustment disorder with mixed anxiety and depressed mood (04/06/2023), Age-related nuclear cataract, bilateral (10/18/2022), Anesthesia of skin (03/16/2018), Bitten or stung by nonvenomous insect and other nonvenomous arthropods, initial encounter (09/01/2016), Carpal tunnel syndrome (04/06/2023), Cervical lymphadenitis (04/06/2023), Chronic pain of left ankle (04/06/2023), Diverticulitis of large intestine without perforation or abscess without bleeding (07/21/2013), Dry eye syndrome of unspecified lacrimal gland (10/18/2022),  Encounter for follow-up examination after completed treatment for conditions other than malignant neoplasm (11/18/2015), Encounter for immunization (02/20/2016), Encounter for other preprocedural examination (10/09/2015), Encounter for screening for infections with a predominantly sexual mode of transmission (03/16/2018), Encounter for screening for infections with a predominantly sexual mode of transmission (02/28/2018), Encounter for screening for infections with a predominantly sexual mode of transmission (02/28/2018), Encounter for screening for malignant neoplasm of colon (11/23/2020), Excessive and frequent menstruation with irregular cycle (07/21/2013), Facial cellulitis (05/17/2023), Follicular disorder, unspecified (04/02/2021), Hemorrhage of anus and rectum (05/09/2014), Hot flashes, menopausal (04/06/2023), Hyperlipidemia, unspecified (07/21/2013), Impetigo, unspecified (04/02/2021), Incarcerated umbilical hernia (04/06/2023), Inflamed seborrheic keratosis (04/02/2021), Lateral epicondylitis, unspecified elbow (03/16/2018), Lateral epicondylitis, unspecified elbow (02/28/2018), Lateral epicondylitis, unspecified elbow (02/28/2018), Left sided colitis without complications (Multi) (07/21/2013), Lumbosacral spondylosis (04/06/2023), Myopia, bilateral (10/18/2022), Nevus of left conjunctiva (04/06/2023), Obstructive sleep apnea (adult) (pediatric) (07/21/2013), Other chest pain (05/08/2019), Other conditions influencing health status (12/28/2020), Other conditions influencing health status (05/08/2019), Other enthesopathies, not elsewhere classified (05/06/2014), Other hypersomnia (11/08/2017), 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), Other specified abnormal findings of blood chemistry (10/07/2016), Other symptoms and signs involving the genitourinary system (11/23/2020), Other symptoms and signs involving the nervous system (04/13/2017), Personal history of diseases of the blood and blood-forming  organs and certain disorders involving the immune mechanism (10/18/2022), Personal history of diseases of the skin and subcutaneous tissue, Personal history of other diseases of the circulatory system (07/10/2020), Personal history of other diseases of the female genital tract (10/26/2020), Personal history of other diseases of the musculoskeletal system and connective tissue (01/16/2018), Personal history of other diseases of the respiratory system (07/21/2013), Personal history of other diseases of the respiratory system (04/14/2014), Personal history of other diseases of urinary system (02/02/2022), Personal history of other diseases of urinary system (11/01/2021), Personal history of other infectious and parasitic diseases (04/14/2014), Personal history of other infectious and parasitic diseases, Personal history of other specified conditions (07/21/2020), Personal history of other specified conditions (01/18/2017), Personal history of other specified conditions (11/23/2020), Personal history of other specified conditions (07/14/2022), Personal history of other specified conditions (10/14/2020), Personal history of other specified conditions (06/18/2016), Personal history of other specified conditions (07/26/2016), Personal history of other specified conditions (06/18/2016), Personal history of peptic ulcer disease, Personal history of pulmonary embolism (08/21/2020), Personal history of urinary (tract) infections (11/05/2015), Piriformis syndrome of right side (04/06/2023), Postnasal drip (04/14/2014), Primary osteoarthritis of first carpometacarpal joint of left hand (04/06/2023), Rash and other nonspecific skin eruption (07/14/2022), Right upper quadrant abdominal tenderness (05/22/2017), Spondylolisthesis, acquired (04/06/2023), Type 2 diabetes mellitus (Multi) (11/21/2022), Type 2 diabetes mellitus without complication, without long-term current use of insulin (Multi) (04/06/2023), Ulcerative blepharitis  unspecified eye, unspecified eyelid (09/21/2018), Ulnar neuropathy at elbow of right upper extremity (04/06/2023), Unspecified exophthalmos (07/21/2013), Unspecified exophthalmos (10/18/2022), Urinary tract infection, site not specified (04/14/2014), and Vascular disorder of intestine, unspecified (Multi) (07/21/2013).  - has a past surgical history that includes Total vaginal hysterectomy (09/11/2014); Hysteroscopy (05/09/2014); Other surgical history (04/07/2014); Tonsillectomy (04/07/2014); Tubal ligation (04/07/2014); CT angio abdomen pelvis w and or wo IV IV contrast (11/12/2015); CT angio abdomen pelvis w and or wo IV IV contrast (5/8/2022); and CT angio abdomen pelvis w and or wo IV IV contrast (4/1/2023).    Medications:  Reviewed in EMR. See EMR for complete list of medications and doses.    Allergies:  Adhesive and Latex    Social History:  - Tobacco:  reports that she quit smoking about 30 years ago. Her smoking use included cigarettes. She started smoking about 34 years ago. She has a 1 pack-year smoking history. She has been exposed to tobacco smoke. She has never used smokeless tobacco.   - Alcohol:  reports current alcohol use of about 2.0 standard drinks of alcohol per week.   - Illicit Drugs:  reports no history of drug use.     ROS:  Per HPI.     ???????????????????????????????????????????????????????????????  Triage Vitals:  T (!) 38 °C (100.4 °F)  HR 91  BP (!) 121/92  RR 19  O2 98 % None (Room air)    Physical exam:   Triage vitals reviewed.  Constitutional: Well developed adult in no acute distress  Head: Normocephalic, atraumatic  Skin: Intact, dry. No rashes or lesions.  Eyes: PERRL, EOMI. No conjunctival injection.  Neck: Supple. Trachea is midline.  Pulmonary: Normal work of breathing with no accessory muscle use noted.  Clear to auscultation bilaterally without wheezes, rales, or rhonchi.  Cardiovascular: Normal rate, regular rhythm.  No murmurs/gallops/rubs appreciated.  Occasional  PAC noted on monitor. 2+ radial pulses bilaterally.   Abdomen: Soft, nondistended. Nontender to palpation.  There is mild left CVA tenderness.  Extremities: No gross deformities.  Moving all extremities spontaneously without difficulty. R>L LE edema; R leg is nontender but warm to the touch.  Neuro: Awake and alert. Face is symmetric.  Speech is clear. No obvious focal findings.  Psych: Appropriate mood and affect.  ???????????????????????????????????????????????????????????????  ED Course:  ED Course as of 06/22/24 1921   Sat Jun 22, 2024   1531 HEMOGLOBIN(!): 16.2 []   1531 With fever, HR >90, and WBC 3.7 there was concern for sepsis. Blood cultures collected and patient started on vancomycin and zosyn []   1556 Troponin I Series, High Sensitivity (0, 1 HR)  Troponin wnl x2 []   1556 Lactate: 0.7 []   1556 XR chest 2 views  Reviewed CXR with no evidence of PTX, consolidation, or widened mediastinum. []   1600 ECG 12 lead  I independently interpreted the EKG (EM resident): 91 bpm, normal sinus rhythm with RBBB.  Normal axis.  , QTc 432, .  No ST elevations or depressions concerning for ischemia.  T wave inversions present in leads III, aVF, V3, V4, V5, which are present on prior EKGs.  Compared with prior EKG on 6/19/2024, T wave inversions in inferior and anterior leads are more prominent, however are similar to their appearance on EKG 4/24/2024.   []   1710 Troponin I Series, High Sensitivity (0, 1 HR)  Troponin wnl x2 []      ED Course User Index  [] Pati Lawson MD       EKG & Images:  Independently reviewed, See ED Course      MDM:  Marni Lugo is a 63 y.o. 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  On arrival, patient was febrile to 38C, normotensive, HR 91, and saturating 98% on room air.  Exam was significant for left-sided CVA tenderness, and right lower extremity swelling and warmth.   Tylenol ordered for pain.     Differential diagnosis includes ACS, DVT/PE, cellulitis, UTI, CHF exacerbation, PNA.  CBC showed WBC 3.7, and in the setting of her fever and HR>90, there were concerns for sepsis.  Blood cultures were collected and patient was started on vancomycin and Zosyn.  EKG was nonischemic, and troponin was negative x 2, so low concern for acute MI as a cause of her symptoms.  Duplex US of the right lower extremity showed no evidence of DVT.  COVID, flu negative.  UA showed no evidence of UTI, but did show microscopic hematuria.    Patient signed out to oncoming resident Dr. Stokes pending completion of CT PE and CT AP.    Patient seen and staffed with Dr. Cantu    Social Determinants Limiting Care:  None identified    Disposition:  Handoff    Pati Lawson MD   Emergency Medicine PGY1  [facility]     Disclaimer: This note was dictated by speech recognition. Minor errors in transcription may be present    \ ? SmartLinks last updated 6/22/2024 7:21 PM        Pati Lawson MD  Resident  06/22/24 [id number]

## 2024-06-22 NOTE — ED TRIAGE NOTES
Pt presents to ED for mid sternal CP since yesterday, headache, SOB, abd pain, urinary incont.    Unable to assess

## 2024-06-23 LAB
BACTERIA BLD CULT: NORMAL
BACTERIA BLD CULT: NORMAL
HOLD SPECIMEN: NORMAL

## 2024-06-23 NOTE — DISCHARGE INSTRUCTIONS
Follow-up with your primary care physician to discuss your ED visit today.  Please return to the emergency department if you develop worsening fevers, chills, chest pain, shortness of breath, burning with urination, abdominal pain, nausea, vomiting.  Please follow-up your culture results on MyChart.

## 2024-06-24 ENCOUNTER — TELEPHONE (OUTPATIENT)
Dept: PRIMARY CARE | Facility: CLINIC | Age: 64
End: 2024-06-24

## 2024-06-24 ENCOUNTER — LAB (OUTPATIENT)
Dept: LAB | Facility: LAB | Age: 64
End: 2024-06-24
Payer: MEDICARE

## 2024-06-24 DIAGNOSIS — N18.31 CHRONIC KIDNEY DISEASE, STAGE 3A (MULTI): Primary | ICD-10-CM

## 2024-06-24 LAB
25(OH)D3 SERPL-MCNC: 15 NG/ML (ref 30–100)
ALBUMIN SERPL BCP-MCNC: 3.8 G/DL (ref 3.4–5)
AMORPH CRY #/AREA UR COMP ASSIST: ABNORMAL /HPF
ANION GAP SERPL CALC-SCNC: 11 MMOL/L (ref 10–20)
APPEARANCE UR: ABNORMAL
BASOPHILS # BLD AUTO: 0.03 X10*3/UL (ref 0–0.1)
BASOPHILS NFR BLD AUTO: 0.8 %
BILIRUB UR STRIP.AUTO-MCNC: NEGATIVE MG/DL
BUN SERPL-MCNC: 16 MG/DL (ref 6–23)
CALCIUM SERPL-MCNC: 8.1 MG/DL (ref 8.6–10.3)
CHLORIDE SERPL-SCNC: 108 MMOL/L (ref 98–107)
CO2 SERPL-SCNC: 22 MMOL/L (ref 21–32)
COLOR UR: YELLOW
CREAT SERPL-MCNC: 1.59 MG/DL (ref 0.5–1.05)
CREAT UR-MCNC: 103 MG/DL (ref 20–320)
CREAT UR-MCNC: 103 MG/DL (ref 20–320)
EGFRCR SERPLBLD CKD-EPI 2021: 37 ML/MIN/1.73M*2
EOSINOPHIL # BLD AUTO: 0.01 X10*3/UL (ref 0–0.7)
EOSINOPHIL NFR BLD AUTO: 0.3 %
ERYTHROCYTE [DISTWIDTH] IN BLOOD BY AUTOMATED COUNT: 13.6 % (ref 11.5–14.5)
GLUCOSE SERPL-MCNC: 116 MG/DL (ref 74–99)
GLUCOSE UR STRIP.AUTO-MCNC: ABNORMAL MG/DL
HCT VFR BLD AUTO: 44.9 % (ref 36–46)
HGB BLD-MCNC: 15.3 G/DL (ref 12–16)
IMM GRANULOCYTES # BLD AUTO: 0 X10*3/UL (ref 0–0.7)
IMM GRANULOCYTES NFR BLD AUTO: 0 % (ref 0–0.9)
KETONES UR STRIP.AUTO-MCNC: NEGATIVE MG/DL
LEUKOCYTE ESTERASE UR QL STRIP.AUTO: ABNORMAL
LYMPHOCYTES # BLD AUTO: 2.43 X10*3/UL (ref 1.2–4.8)
LYMPHOCYTES NFR BLD AUTO: 66.8 %
MCH RBC QN AUTO: 29.5 PG (ref 26–34)
MCHC RBC AUTO-ENTMCNC: 34.1 G/DL (ref 32–36)
MCV RBC AUTO: 87 FL (ref 80–100)
MICROALBUMIN UR-MCNC: 148.5 MG/L
MICROALBUMIN/CREAT UR: 144.2 UG/MG CREAT
MONOCYTES # BLD AUTO: 0.37 X10*3/UL (ref 0.1–1)
MONOCYTES NFR BLD AUTO: 10.2 %
NEUTROPHILS # BLD AUTO: 0.8 X10*3/UL (ref 1.2–7.7)
NEUTROPHILS NFR BLD AUTO: 21.9 %
NITRITE UR QL STRIP.AUTO: NEGATIVE
NRBC BLD-RTO: 0 /100 WBCS (ref 0–0)
PH UR STRIP.AUTO: 5.5 [PH]
PHOSPHATE SERPL-MCNC: 3.2 MG/DL (ref 2.5–4.9)
PLATELET # BLD AUTO: 151 X10*3/UL (ref 150–450)
POTASSIUM SERPL-SCNC: 3.7 MMOL/L (ref 3.5–5.3)
PROT UR STRIP.AUTO-MCNC: ABNORMAL MG/DL
PROT UR-ACNC: 117 MG/DL (ref 5–24)
PROT/CREAT UR: 1.14 MG/MG CREAT (ref 0–0.17)
RBC # BLD AUTO: 5.18 X10*6/UL (ref 4–5.2)
RBC # UR STRIP.AUTO: ABNORMAL /UL
RBC #/AREA URNS AUTO: ABNORMAL /HPF
RBC MORPH BLD: NORMAL
SODIUM SERPL-SCNC: 137 MMOL/L (ref 136–145)
SP GR UR STRIP.AUTO: 1.02
SQUAMOUS #/AREA URNS AUTO: ABNORMAL /HPF
UROBILINOGEN UR STRIP.AUTO-MCNC: NORMAL MG/DL
WBC # BLD AUTO: 3.6 X10*3/UL (ref 4.4–11.3)
WBC #/AREA URNS AUTO: ABNORMAL /HPF
WBC CLUMPS #/AREA URNS AUTO: ABNORMAL /HPF
YEAST BUDDING #/AREA UR COMP ASSIST: PRESENT /HPF

## 2024-06-24 PROCEDURE — 81001 URINALYSIS AUTO W/SCOPE: CPT

## 2024-06-24 PROCEDURE — 82306 VITAMIN D 25 HYDROXY: CPT

## 2024-06-24 PROCEDURE — 82570 ASSAY OF URINE CREATININE: CPT

## 2024-06-24 PROCEDURE — 80069 RENAL FUNCTION PANEL: CPT

## 2024-06-24 PROCEDURE — 36415 COLL VENOUS BLD VENIPUNCTURE: CPT

## 2024-06-24 PROCEDURE — 83970 ASSAY OF PARATHORMONE: CPT

## 2024-06-24 PROCEDURE — 82043 UR ALBUMIN QUANTITATIVE: CPT

## 2024-06-24 PROCEDURE — 85025 COMPLETE CBC W/AUTO DIFF WBC: CPT

## 2024-06-24 PROCEDURE — 84156 ASSAY OF PROTEIN URINE: CPT

## 2024-06-24 NOTE — TELEPHONE ENCOUNTER
Patient called in and reported she was seen in the ER this weekend for chest pains and left side pain. Patient said that the chest pain is subsiding but the pain in her side is worsening. She also reported she has had a low grade fever. Patient is wanting to see you but next available is not until July 24th. Patient is unsure what to do going forward, please advise.

## 2024-06-25 LAB
ATRIAL RATE: 91 BPM
P AXIS: 44 DEGREES
P OFFSET: 218 MS
P ONSET: 166 MS
PR INTERVAL: 126 MS
PTH-INTACT SERPL-MCNC: 152.2 PG/ML (ref 18.5–88)
Q ONSET: 229 MS
QRS COUNT: 15 BEATS
QRS DURATION: 116 MS
QT INTERVAL: 352 MS
QTC CALCULATION(BAZETT): 432 MS
QTC FREDERICIA: 404 MS
R AXIS: 67 DEGREES
T AXIS: 17 DEGREES
T OFFSET: 405 MS
VENTRICULAR RATE: 91 BPM

## 2024-06-26 ENCOUNTER — TELEPHONE (OUTPATIENT)
Dept: CARDIOLOGY | Facility: CLINIC | Age: 64
End: 2024-06-26
Payer: MEDICARE

## 2024-06-26 LAB
BACTERIA BLD CULT: NORMAL
BACTERIA BLD CULT: NORMAL

## 2024-06-26 NOTE — TELEPHONE ENCOUNTER
Patient called in confused by her lab results at the emergency room. She stated that her cardiologist called her and told her that she had an UTI but she was told that she didn't have one in the emergency room. She would like to see if you could call her with an affirmative answer.

## 2024-06-26 NOTE — TELEPHONE ENCOUNTER
6/26/24  1139  Called results to patient and plan for office visit to discuss next steps; offered patient an earlier appt.  Patient verbalized understanding of results and is agreeable to plan; reports she in the ED recently with chest and flank pain.    Message sent to Evaristo to push up August appt.    ----- Message from Paul He MD sent at 6/25/2024  9:00 AM EDT -----  Stress test was not adequate/submaximal. Should see me in office to discuss further options.   ----- Message -----  From: Fabiola, Syngo - Cardiology Results In  Sent: 6/19/2024   4:31 PM EDT  To: Paul He MD

## 2024-06-26 NOTE — TELEPHONE ENCOUNTER
6/26/24  1722  Called patient; no answer. Left voice message informing that temperature fluctuations are not a side effect, that with patient's recent ER visit, she may want to follow up with her PCP and that if needed we can switch meds and to call if questions.      ----- Message from Paul He MD sent at 6/25/2024  8:27 AM EDT -----  Fluctuation in temperature/Fever is not a typical side effect of metoprolol. She should see PCP to get evaluation for infection/inflammation.     If that is negative then we can switch to coreg.   ----- Message -----  From: Jake Luz RN  Sent: 6/24/2024   9:00 AM EDT  To: Paul He MD    Patient called in to report experiencing some side effects from her metoprolol that she was prescribed on 6/4. Stated her body temperature is fluctuating a lot and she is having headaches.     Headaches better, but still temp. Fluctuations. Didn't know if you wanted to try something else.

## 2024-07-10 RX ORDER — TRAMADOL HYDROCHLORIDE 50 MG/1
TABLET ORAL EVERY 24 HOURS
COMMUNITY

## 2024-07-10 RX ORDER — WARFARIN SODIUM 5 MG/1
TABLET ORAL EVERY 24 HOURS
COMMUNITY
End: 2024-07-16 | Stop reason: WASHOUT

## 2024-07-16 ENCOUNTER — APPOINTMENT (OUTPATIENT)
Dept: CARDIOLOGY | Facility: CLINIC | Age: 64
End: 2024-07-16
Payer: MEDICARE

## 2024-07-16 ENCOUNTER — TELEPHONE (OUTPATIENT)
Dept: CARDIOLOGY | Facility: CLINIC | Age: 64
End: 2024-07-16

## 2024-07-16 VITALS
WEIGHT: 183 LBS | SYSTOLIC BLOOD PRESSURE: 124 MMHG | HEIGHT: 61 IN | OXYGEN SATURATION: 96 % | HEART RATE: 70 BPM | BODY MASS INDEX: 34.55 KG/M2 | DIASTOLIC BLOOD PRESSURE: 64 MMHG

## 2024-07-16 DIAGNOSIS — I20.9 ANGINA PECTORIS (CMS-HCC): Primary | ICD-10-CM

## 2024-07-16 DIAGNOSIS — I10 PRIMARY HYPERTENSION: ICD-10-CM

## 2024-07-16 DIAGNOSIS — E78.2 MIXED HYPERLIPIDEMIA: ICD-10-CM

## 2024-07-16 DIAGNOSIS — I50.32 CHRONIC DIASTOLIC CONGESTIVE HEART FAILURE (MULTI): ICD-10-CM

## 2024-07-16 PROCEDURE — 3078F DIAST BP <80 MM HG: CPT | Performed by: STUDENT IN AN ORGANIZED HEALTH CARE EDUCATION/TRAINING PROGRAM

## 2024-07-16 PROCEDURE — 3060F POS MICROALBUMINURIA REV: CPT | Performed by: STUDENT IN AN ORGANIZED HEALTH CARE EDUCATION/TRAINING PROGRAM

## 2024-07-16 PROCEDURE — 3048F LDL-C <100 MG/DL: CPT | Performed by: STUDENT IN AN ORGANIZED HEALTH CARE EDUCATION/TRAINING PROGRAM

## 2024-07-16 PROCEDURE — 1036F TOBACCO NON-USER: CPT | Performed by: STUDENT IN AN ORGANIZED HEALTH CARE EDUCATION/TRAINING PROGRAM

## 2024-07-16 PROCEDURE — 3044F HG A1C LEVEL LT 7.0%: CPT | Performed by: STUDENT IN AN ORGANIZED HEALTH CARE EDUCATION/TRAINING PROGRAM

## 2024-07-16 PROCEDURE — 3008F BODY MASS INDEX DOCD: CPT | Performed by: STUDENT IN AN ORGANIZED HEALTH CARE EDUCATION/TRAINING PROGRAM

## 2024-07-16 PROCEDURE — 99215 OFFICE O/P EST HI 40 MIN: CPT | Performed by: STUDENT IN AN ORGANIZED HEALTH CARE EDUCATION/TRAINING PROGRAM

## 2024-07-16 PROCEDURE — 3074F SYST BP LT 130 MM HG: CPT | Performed by: STUDENT IN AN ORGANIZED HEALTH CARE EDUCATION/TRAINING PROGRAM

## 2024-07-16 RX ORDER — NAPROXEN SODIUM 220 MG/1
81 TABLET, FILM COATED ORAL DAILY
Qty: 30 TABLET | Refills: 11 | Status: SHIPPED | OUTPATIENT
Start: 2024-07-16 | End: 2025-07-16

## 2024-07-16 NOTE — PROGRESS NOTES
[facility] Note    Date: 07/16/24  Time: 3:41 PM    Subjective   Marni Lugo is a 63 y.o. female who is coming to establish care.  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 The patient reports that she has had chest pain that started months ago.  She reports chest tightness with activity/playing with kids and relieved after resting.  She has cut down her activity due to same.  We had recommended a stress test which was performed but the stress test was submaximal.  She continues to have increasing frequency and duration of chest discomfort and nitroglycerin has not been useful.  She is here to discuss alternate options.       Review of Systems:  Otherwise, limited cardiovascular review of systems is negative.        Medical History:   She has a past medical history of Abnormal finding of blood chemistry, unspecified (04/14/2014), Acute bronchospasm (04/18/2016), Acute candidiasis of vulva and vagina (07/21/2013), Acute embolism and thrombosis of unspecified vein (02/18/2014), Acute vaginitis (02/11/2016), Adjustment disorder with mixed anxiety and depressed mood (04/06/2023), Age-related nuclear cataract, bilateral (10/18/2022), Anesthesia of skin (03/16/2018), Bitten or stung by nonvenomous insect and other nonvenomous arthropods, initial encounter (09/01/2016), Carpal tunnel syndrome (04/06/2023), Cervical lymphadenitis (04/06/2023), Chronic pain of left ankle (04/06/2023), Diverticulitis of large intestine without perforation or abscess without bleeding (07/21/2013), Dry eye syndrome of unspecified lacrimal gland (10/18/2022), Encounter for follow-up examination after completed treatment for conditions other than malignant neoplasm (11/18/2015), Encounter for immunization (02/20/2016), Encounter for other preprocedural examination (10/09/2015), Encounter for screening for infections with a  predominantly sexual mode of transmission (03/16/2018), Encounter for screening for infections with a predominantly sexual mode of transmission (02/28/2018), Encounter for screening for infections with a predominantly sexual mode of transmission (02/28/2018), Encounter for screening for malignant neoplasm of colon (11/23/2020), Excessive and frequent menstruation with irregular cycle (07/21/2013), Facial cellulitis (05/17/2023), Follicular disorder, unspecified (04/02/2021), Hemorrhage of anus and rectum (05/09/2014), Hot flashes, menopausal (04/06/2023), Hyperlipidemia, unspecified (07/21/2013), Impetigo, unspecified (04/02/2021), Incarcerated umbilical hernia (04/06/2023), Inflamed seborrheic keratosis (04/02/2021), Lateral epicondylitis, unspecified elbow (03/16/2018), Lateral epicondylitis, unspecified elbow (02/28/2018), Lateral epicondylitis, unspecified elbow (02/28/2018), Left sided colitis without complications (Multi) (07/21/2013), Lumbosacral spondylosis (04/06/2023), Myopia, bilateral (10/18/2022), Nevus of left conjunctiva (04/06/2023), Obstructive sleep apnea (adult) (pediatric) (07/21/2013), Other chest pain (05/08/2019), Other conditions influencing health status (12/28/2020), Other conditions influencing health status (05/08/2019), Other enthesopathies, not elsewhere classified (05/06/2014), Other hypersomnia (11/08/2017), 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), Other specified abnormal findings of blood chemistry (10/07/2016), Other symptoms and signs involving the genitourinary system (11/23/2020), Other symptoms and signs involving the nervous system (04/13/2017), Personal history of diseases of the blood and blood-forming organs and certain disorders involving the immune mechanism (10/18/2022), Personal history of diseases of the skin and subcutaneous tissue, Personal history of other diseases of the circulatory system (07/10/2020), Personal history of other diseases of the female  genital tract (10/26/2020), Personal history of other diseases of the musculoskeletal system and connective tissue (01/16/2018), Personal history of other diseases of the respiratory system (07/21/2013), Personal history of other diseases of the respiratory system (04/14/2014), Personal history of other diseases of urinary system (02/02/2022), Personal history of other diseases of urinary system (11/01/2021), Personal history of other infectious and parasitic diseases (04/14/2014), Personal history of other infectious and parasitic diseases, Personal history of other specified conditions (07/21/2020), Personal history of other specified conditions (01/18/2017), Personal history of other specified conditions (11/23/2020), Personal history of other specified conditions (07/14/2022), Personal history of other specified conditions (10/14/2020), Personal history of other specified conditions (06/18/2016), Personal history of other specified conditions (07/26/2016), Personal history of other specified conditions (06/18/2016), Personal history of peptic ulcer disease, Personal history of pulmonary embolism (08/21/2020), Personal history of urinary (tract) infections (11/05/2015), Piriformis syndrome of right side (04/06/2023), Postnasal drip (04/14/2014), Primary osteoarthritis of first carpometacarpal joint of left hand (04/06/2023), Rash and other nonspecific skin eruption (07/14/2022), Right upper quadrant abdominal tenderness (05/22/2017), Spondylolisthesis, acquired (04/06/2023), Type 2 diabetes mellitus (Multi) (11/21/2022), Type 2 diabetes mellitus without complication, without long-term current use of insulin (Multi) (04/06/2023), Ulcerative blepharitis unspecified eye, unspecified eyelid (09/21/2018), Ulnar neuropathy at elbow of right upper extremity (04/06/2023), Unspecified exophthalmos (07/21/2013), Unspecified exophthalmos (10/18/2022), Urinary tract infection, site not specified (04/14/2014), and Vascular  disorder of intestine, unspecified (Multi) (07/21/2013).  Surgical History:   Past Surgical History:   Procedure Laterality Date    CT ABDOMEN PELVIS ANGIOGRAM W AND/OR WO IV CONTRAST  11/12/2015    CT ABDOMEN PELVIS ANGIOGRAM W AND/OR WO IV CONTRAST 11/12/2015 [facility]    CT ABDOMEN PELVIS ANGIOGRAM W AND/OR WO IV CONTRAST  5/8/2022    CT ABDOMEN PELVIS ANGIOGRAM W AND/OR WO IV CONTRAST 5/8/2022 DOCTOR OFFICE LEGACY    CT ABDOMEN PELVIS ANGIOGRAM W AND/OR WO IV CONTRAST  4/1/2023    CT ABDOMEN PELVIS ANGIOGRAM W AND/OR WO IV CONTRAST U CT    HYSTEROSCOPY  05/09/2014    Hysteroscopy With Endometrial Ablation    OTHER SURGICAL HISTORY  04/07/2014    Left Hemicolectomy    TONSILLECTOMY  04/07/2014    Tonsillectomy    TOTAL VAGINAL HYSTERECTOMY  09/11/2014    Vaginal Hysterectomy    TUBAL LIGATION  04/07/2014    Tubal Ligation   PSHP@  Social History:   Social Determinants of Health with Concerns     Tobacco Use: Medium Risk (7/16/2024)    Patient History     Smoking Tobacco Use: Former     Smokeless Tobacco Use: Never     Passive Exposure: Past   Alcohol Use: Not on file   Financial Resource Strain: Not on file   Food Insecurity: Not on file   Transportation Needs: Not on file   Physical Activity: Not on file   Stress: Not on file   Social Connections: Not on file   Intimate Partner Violence: Not on file   Housing Stability: Not on file   Utilities: Not on file   Digital Equity: Not on file   Health Literacy: Not on file     Family History:   Family History   Problem Relation Name Age of Onset    Heart disease Mother      Kidney failure Father      Cirrhosis Sister      Breast cancer Neg Hx        Allergies:  Adhesive and Latex    Outpatient Medications:  Current Outpatient Medications   Medication Instructions    albuterol 90 mcg/actuation inhaler 2 puffs, inhalation, Every 6 hours PRN    albuterol 2.5 mg, nebulization, Every 6 hours PRN    amLODIPine (NORVASC) 10 mg, oral, Daily    apixaban (ELIQUIS) 5  "mg, oral, 2 times daily    aspirin 81 mg, oral, Daily    carbidopa-levodopa (Sinemet)  mg tablet 1.5 tablets, oral, Daily, Take 1 and 1/2 (one and one-half) tablets by mouth daily.    cholecalciferol, vitamin D3, (VITAMIN D3 ORAL) oral    empagliflozin (JARDIANCE) 25 mg, oral, Daily    famotidine (PEPCID) 20 mg, oral, 2 times daily    fluticasone (Flonase) 50 mcg/actuation nasal spray SHAKE LIQUID AND USE 1 TO 2 SPRAYS IN EACH NOSTRIL DAILY AS NEEDED FOR SYMPTOMS OR ALLERGY    furosemide (Lasix) 20 mg tablet TAKE 1 TABLET BY MOUTH EVERY DAY AS NEEDED FOR LEG SWELLING    Gemtesa 75 mg, oral, Daily    metoprolol tartrate (LOPRESSOR) 25 mg, oral, 2 times daily    nitroglycerin (NITROSTAT) 0.4 mg, sublingual, Every 5 min PRN    pantoprazole (PROTONIX) 40 mg, oral, Daily    rosuvastatin (CRESTOR) 40 mg, oral, Daily    sildenafil (Revatio) 20 mg tablet 1 tablet, oral, 2 times daily    topiramate (TOPAMAX) 100 mg, oral, Daily    traMADol (Ultram) 50 mg tablet Every 24 hours    valACYclovir (VALTREX) 500 mg, oral, Daily       Objective     Physical Exam  Vitals:    07/16/24 1512   BP: 124/64   BP Location: Left arm   Patient Position: Sitting   BP Cuff Size: Adult   Pulse: 70   SpO2: 96%   Weight: 83 kg (183 lb)   Height: 1.549 m (5' 1\")     Wt Readings from Last 3 Encounters:   07/16/24 83 kg (183 lb)   06/22/24 81.6 kg (180 lb)   06/04/24 84.4 kg (186 lb)       General: Alert and Oriented, No distress, cooperative  Head: Normocephalic without obvious abnormality, atraumatic  Eyes: Conjunctiva/corneas clear, EOM's grossly intact  Neck: Supple, trachea midline, No thyroid enlargement/tenderness/nodules; No JVD  Lungs: Clear to auscultation bilaterally, no wheezes, rhonci, or rales. respirations unlabored  Chest Wall: No tenderness or deformity  Heart: Regular rhythm, normal S1/S2, no murmur  Abdomen: Soft, non-tender, Non-distended, bowel sounds active  Extremities: No edema, no cyanosis, no clubbing  Skin: Skin " color, texture, turgor normal.  No rashes or lesions noted  Neurologic: Alert and oriented x 3, grossly moving all extremities, speech intact        I have personally reviewed the following images and laboratory findings:  ECG: Not done today  Echocardiogram:      PHYSICIAN INTERPRETATION:  Left Ventricle: The left ventricular systolic function is normal, with an estimated ejection fraction of 65%. There are no regional wall motion abnormalities. The left ventricular cavity size is normal. Spectral Doppler shows an impaired relaxation pattern of left ventricular diastolic filling.  Left Atrium: The left atrium is normal in size.  Right Ventricle: The right ventricle is normal in size. There is normal right ventricular global systolic function.  Right Atrium: The right atrium is normal in size.  Aortic Valve: The aortic valve appears structurally normal. There is no evidence of aortic valve regurgitation. The peak instantaneous gradient of the aortic valve is 7.4 mmHg. The mean gradient of the aortic valve is 4.1 mmHg.  Mitral Valve: The mitral valve is normal in structure. There is trace mitral valve regurgitation.  Tricuspid Valve: The tricuspid valve is structurally normal. No evidence of tricuspid regurgitation. The right ventricular systolic pressure is unable to be estimated.  Pulmonic Valve: The pulmonic valve is structurally normal. There is trace pulmonic valve regurgitation.  Pericardium: There is a trivial pericardial effusion.  Aorta: The aortic root is normal.  Systemic Veins: The inferior vena cava appears to be of normal size. There is IVC inspiratory collapse greater than 50%.  In comparison to the previous echocardiogram(s): Compared with study from 8/10/2017, no significant change.        CONCLUSIONS:   1. Left ventricular systolic function is normal with a 65% estimated ejection fraction.   2. Spectral Doppler shows an impaired relaxation pattern of left ventricular diastolic filling.  Exercise  Stress Echo     Patient Name:      YUMIKO ADAMES        Ordering Provider:     [id number] SAMIR GREGORIO  Study Date:        6/19/2024            Reading Physician:     [id number] Barnhart MD  MRN/PID:           [id number]             Supervising Physician: Ace Barnhart MD  Accession#:        [id number]         Fellow:  Date of Birth/Age: 1960 / 63 years Fellow:  Gender:            F                    Nurse:                 Therese Pizano  Admission Status:  Outpatient           Sonographer:           Marie Paniagua RDCS  Height:            154.94 cm            Technologist:  Weight:            84.37 kg             Additional Staff:  BSA:               1.83 m2              Encounter#:            [id number]  BMI:               35.14 kg/m2          Patient Location:      [facility]     Study Type:    ECHOCARDIOGRAM STRESS TEST  Diagnosis/ICD: Other forms of angina pectoris-I20.8  Indication:    Chest Pain     Falls Risk:     Study Details: Correct procedure and correct patient verified verbally and with                 ID Band checked.        Patient History: Chronic lung disease, previous deep vein thrombosis, hyperlipidemia, hypertension, palpitations, dyspnea, Cerebral palsy, Sarcoidosis, Raynauds and congestive heart failure.  Allergies: Adhesive, Latex.        Medications: Amlodipine, Apixaban, Sinemet, Gemtesa, Metoprolol, Pantoprazole, Rosuvastatin, Sildenafil,.     Patient Performance: The patient exercised to stage II on a Peterson protocol for 5 minutes and 30 seconds, achieving 7.0 METS. The peak heart rate achieved was 121 bpm, which was 77 % of the age predicted target heart rate of 157 bpm. The resting blood pressure was 115/73 mmHg with a heart rate of 63 bpm. The standing blood pressure was 136/85 mmHg  with a heart rate of 62 bpm. The patient's functional capacity was average. The patient developed shortness of breath during the stress exam. The symptoms resolved with rest. The blood pressure response was hypertensive. The test was terminated due to: fatigue.     Peak Oxygen Use: 22.-24 ml/kg/min.  Exercise Capacity: 70% Achieved HR = 4.6 METS : 85% Achieved HR = 7.0 METS.        Baseline ECG: Resting ECG showed normal sinus rhythm with right bundle branch block.     Stress ECG: Stress ECG showed sinus tachycardia, with occ PAC. There was a 1.0 upsloping ST segment depression in leads II, III and aVF during the peak stress period. ST changes resolved in 2 minutes.     Stress Stage Data:  +-----------------+---+------+-------+------+                   HR Sys BPDias BPMETS    +-----------------+---+------+-------+------+  Baseline Resting 63 115   73             +-----------------+---+------+-------+------+  Baseline [id number] 136   85             +-----------------+---+------+-------+------+  Stage I          [id number]   99             +-----------------+---+------+-------+------+  Stage II         [id number]   113    7 METS  +-----------------+---+------+-------+------+        Recovery ECG: Recovery ECG showed normal sinus rhythm. The heart rate recovery was normal.     +-----------+--+------+-------+             HRSys BPDias BP  +-----------+--+------+-------+  Recovery [id number]   95       +-----------+--+------+-------+  Recovery [id number]   80       +-----------+--+------+-------+  Recovery [id number]   77       +-----------+--+------+-------+        Baseline Echo: Definity used as a contrast agent for endocardial border definition. Total contrast used for this procedure was 2 mL via IV push. Normal left ventricular size and function. There are no regional wall motion abnormalities at baseline.     Stress Echo: Normal left ventricular size and function during peak stress.  There are no stress induced regional wall motion abnormalities.     Summary:   1. No clinical, echocardiographic or electrocardiographic evidence for ischemia at submaximal workload.   2. Submaximal level of stress achieved.   3. The Tinsley score is 0.     Laboratory values:   Lab on 06/24/2024   Component Date Value    WBC 06/24/2024 3.6 (L)     nRBC 06/24/2024 0.0     RBC 06/24/2024 5.18     Hemoglobin 06/24/2024 15.3     Hematocrit 06/24/2024 44.9     MCV 06/24/2024 87     MCH 06/24/2024 29.5     MCHC 06/24/2024 34.1     RDW 06/24/2024 13.6     Platelets 06/24/2024 151     Neutrophils % 06/24/2024 21.9     Immature Granulocytes %,* 06/24/2024 0.0     Lymphocytes % 06/24/2024 66.8     Monocytes % 06/24/2024 10.2     Eosinophils % 06/24/2024 0.3     Basophils % 06/24/2024 0.8     Neutrophils Absolute 06/24/2024 0.80 (L)     Immature Granulocytes Ab* 06/24/2024 0.00     Lymphocytes Absolute 06/24/2024 2.43     Monocytes Absolute 06/24/2024 0.37     Eosinophils Absolute 06/24/2024 0.01     Basophils Absolute 06/24/2024 0.03     Albumin, Urine Random 06/24/2024 148.5     Creatinine, Urine Random 06/24/2024 103.0     Albumin/Creatinine Ratio 06/24/2024 144.2 (H)     Glucose 06/24/2024 116 (H)     Sodium 06/24/2024 137     Potassium 06/24/2024 3.7     Chloride 06/24/2024 108 (H)     Bicarbonate 06/24/2024 22     Anion Gap 06/24/2024 11     Urea Nitrogen 06/24/2024 16     Creatinine 06/24/2024 1.59 (H)     eGFR 06/24/2024 37 (L)     Calcium 06/24/2024 8.1 (L)     Phosphorus 06/24/2024 3.2     Albumin 06/24/2024 3.8     Vitamin D, 25-Hydroxy, T* 06/24/2024 15 (L)     Parathyroid Hormone, Int* 06/24/2024 152.2 (H)     Color, Urine 06/24/2024 Yellow     Appearance, Urine 06/24/2024 Turbid (N)     Specific Gravity, Urine 06/24/2024 1.020     pH, Urine 06/24/2024 5.5     Protein, Urine 06/24/2024 50 (1+) (A)     Glucose, Urine 06/24/2024 OVER (4+) (A)     Blood, Urine 06/24/2024 1.0 (3+) (A)     Ketones, Urine 06/24/2024  NEGATIVE     Bilirubin, Urine 06/24/2024 NEGATIVE     Urobilinogen, Urine 06/24/2024 Normal     Nitrite, Urine 06/24/2024 NEGATIVE     Leukocyte Esterase, Urine 06/24/2024 25 Sarah/µL (A)     Total Protein, Urine Ran* 06/24/2024 117 (H)     Creatinine, Urine Random 06/24/2024 103.0     T. Protein/Creatinine Ra* 06/24/2024 1.14 (H)     WBC, Urine 06/24/2024 6-10 (A)     WBC Clumps, Urine 06/24/2024 RARE     RBC, Urine 06/24/2024 3-5     Squamous Epithelial Cell* 06/24/2024 1-9 (SPARSE)     Budding Yeast, Urine 06/24/2024 PRESENT (A)     Amorphous Crystals, Urine 06/24/2024 1+     RBC Morphology 06/24/2024 No significant RBC morphology present    Admission on 06/22/2024, Discharged on 06/22/2024   Component Date Value    Ventricular Rate 06/22/2024 91     Atrial Rate 06/22/2024 91     IN Interval 06/22/2024 126     QRS Duration 06/22/2024 116     QT Interval 06/22/2024 352     QTC Calculation(Bazett) 06/22/2024 432     P Axis 06/22/2024 44     R Axis 06/22/2024 67     T Leslie 06/22/2024 17     QRS Count 06/22/2024 15     Q Onset 06/22/2024 229     P Onset 06/22/2024 166     P Offset 06/22/2024 218     T Offset 06/22/2024 405     QTC Fredericia 06/22/2024 404     WBC 06/22/2024 3.7 (L)     nRBC 06/22/2024 0.0     RBC 06/22/2024 5.51 (H)     Hemoglobin 06/22/2024 16.2 (H)     Hematocrit 06/22/2024 48.0 (H)     MCV 06/22/2024 87     MCH 06/22/2024 29.4     MCHC 06/22/2024 33.8     RDW 06/22/2024 13.7     Platelets 06/22/2024 229     Neutrophils % 06/22/2024 51.3     Immature Granulocytes %,* 06/22/2024 0.3     Lymphocytes % 06/22/2024 36.9     Monocytes % 06/22/2024 10.4     Eosinophils % 06/22/2024 0.3     Basophils % 06/22/2024 0.8     Neutrophils Absolute 06/22/2024 1.92     Immature Granulocytes Ab* 06/22/2024 0.01     Lymphocytes Absolute 06/22/2024 1.38     Monocytes Absolute 06/22/2024 0.39     Eosinophils Absolute 06/22/2024 0.01     Basophils Absolute 06/22/2024 0.03     Glucose 06/22/2024 102 (H)     Sodium  06/22/2024 139     Potassium 06/22/2024 3.7     Chloride 06/22/2024 109 (H)     Bicarbonate 06/22/2024 21     Anion Gap 06/22/2024 13     Urea Nitrogen 06/22/2024 12     Creatinine 06/22/2024 1.19 (H)     eGFR 06/22/2024 52 (L)     Calcium 06/22/2024 9.1     Albumin 06/22/2024 4.0     Alkaline Phosphatase 06/22/2024 98     Total Protein 06/22/2024 6.9     AST 06/22/2024 20     Bilirubin, Total 06/22/2024 1.0     ALT 06/22/2024 18     Troponin I, High Sensiti* 06/22/2024 7     Troponin I, High Sensiti* 06/22/2024 8     Lactate 06/22/2024 0.7     Blood Culture 06/22/2024 No growth at 4 days -  FINAL REPORT     Blood Culture 06/22/2024 No growth at 4 days -  FINAL REPORT     Color, Urine 06/22/2024 Light-Yellow     Appearance, Urine 06/22/2024 Clear     Specific Gravity, Urine 06/22/2024 1.014     pH, Urine 06/22/2024 6.0     Protein, Urine 06/22/2024 20 (TRACE)     Glucose, Urine 06/22/2024 OVER (4+) (A)     Blood, Urine 06/22/2024 0.1 (1+) (A)     Ketones, Urine 06/22/2024 NEGATIVE     Bilirubin, Urine 06/22/2024 NEGATIVE     Urobilinogen, Urine 06/22/2024 Normal     Nitrite, Urine 06/22/2024 NEGATIVE     Leukocyte Esterase, Urine 06/22/2024 NEGATIVE     Extra Tube 06/22/2024 Hold for add-ons.     Coronavirus 2019, PCR 06/22/2024 Not Detected     Flu A Result 06/22/2024 Not Detected     Flu B Result 06/22/2024 Not Detected     WBC, Urine 06/22/2024 1-5     RBC, Urine 06/22/2024 1-2     Mucus, Urine 06/22/2024 FEW    Lab on 06/18/2024   Component Date Value    Uric Acid 06/18/2024 4.3     Sedimentation Rate 06/18/2024 8     WBC 06/18/2024 6.2     nRBC 06/18/2024 0.0     RBC 06/18/2024 4.95     Hemoglobin 06/18/2024 14.8     Hematocrit 06/18/2024 44.9     MCV 06/18/2024 91     MCH 06/18/2024 29.9     MCHC 06/18/2024 33.0     RDW 06/18/2024 14.0     Platelets 06/18/2024 271     Neutrophils % 06/18/2024 32.1     Immature Granulocytes %,* 06/18/2024 0.2     Lymphocytes % 06/18/2024 58.9     Monocytes % 06/18/2024 7.8      Eosinophils % 06/18/2024 0.5     Basophils % 06/18/2024 0.5     Neutrophils Absolute 06/18/2024 1.98     Immature Granulocytes Ab* 06/18/2024 0.01     Lymphocytes Absolute 06/18/2024 3.62     Monocytes Absolute 06/18/2024 0.48     Eosinophils Absolute 06/18/2024 0.03     Basophils Absolute 06/18/2024 0.03     BNP 06/18/2024 63    Anticoagulation - Warfarin Visit on 05/28/2024   Component Date Value    INR External 05/28/2024 2.40    Anticoagulation - Warfarin Visit on 05/22/2024   Component Date Value    INR External 05/22/2024 1.00      CBC -  Lab Results   Component Value Date    WBC 3.6 (L) 06/24/2024    HGB 15.3 06/24/2024    HCT 44.9 06/24/2024    MCV 87 06/24/2024     06/24/2024       CMP -  Lab Results   Component Value Date    CALCIUM 8.1 (L) 06/24/2024    PHOS 3.2 06/24/2024    PROT 6.9 06/22/2024    ALBUMIN 3.8 06/24/2024    AST 20 06/22/2024    ALT 18 06/22/2024    ALKPHOS 98 06/22/2024    BILITOT 1.0 06/22/2024       LIPID PANEL -   Lab Results   Component Value Date    CHOL 137 05/02/2024    HDL 45.0 05/02/2024    CHHDL 3.0 05/02/2024    VLDL 29 05/02/2024    TRIG 146 05/02/2024    NHDL 92 05/02/2024       RENAL FUNCTION PANEL -   Lab Results   Component Value Date    K 3.7 06/24/2024    PHOS 3.2 06/24/2024       Lab Results   Component Value Date    BNP 63 06/18/2024    HGBA1C 6.5 (H) 05/02/2024        Assessment/Plan   Chest pain/stable angina  HFpEF/diastolic dysfunction  Recurrent VTE  Hyperlipidemia     Plan:  -We ordered a stress test but this was submaximal.  Patient continues to have chest discomfort.  Given continued chest pain and submaximal stress test we will proceed with left heart cath/coronary angiography.  -Most recent echocardiogram was reviewed.  Mild diastolic dysfunction was noted.  -I continue Eliquis as previously taking.  -I will continue on metoprolol tartrate 25 mg twice daily for antianginal effect.  -Continue amlodipine 10 mg daily for antihypertensive effects.  -I will  start on ASA 81 mg daily     RTC as scheduled  In addition, the following orders were placed today:  No orders of the defined types were placed in this encounter.                SIGNATURE: Paul He MD PATIENT NAME: Marni Lugo   DATE/TIME: July 16, 2024 3:41 PM MRN: [id number]

## 2024-07-16 NOTE — TELEPHONE ENCOUNTER
She got scheduled in person on 7/16/24 for a Left heart cath on 7/26/24 with an arrival time of 8:30am and a procedure time of 9:30am

## 2024-07-17 ENCOUNTER — TELEPHONE (OUTPATIENT)
Dept: CARDIOLOGY | Facility: CLINIC | Age: 64
End: 2024-07-17
Payer: MEDICARE

## 2024-07-17 DIAGNOSIS — I20.89 STABLE ANGINA (CMS-HCC): ICD-10-CM

## 2024-07-17 DIAGNOSIS — I20.9 ANGINA PECTORIS (CMS-HCC): Primary | ICD-10-CM

## 2024-07-17 NOTE — TELEPHONE ENCOUNTER
7/17/24  1223  This is a plan of care note for a 63 year old female patient with a history of CHF,DVT/PE, HTN, angina who was referred for a heart catheterization due to angina with an indeterminate stress test.    Patient has no allergy to IV dye  Patient has an elevated serum creatinine  Patient needs labs and EKG; ordered and task sent to Evaristo for scheduling  Patient does not take metformin  Patient does take OAC    Called to give pre procedure instructions for left heart cath to include  Date of procedure, show time of procedure and procedure time, aand possibility of coming in early for pre hydration  NPO after midnight x for aspirin and metoprolol  To hold eliquis two days before procedure  To have a ride due to sedation  To shower the night before and wear loose comfortable clothes  To have EKG and labs done prior to procedure.  To bring an overnight bag in case of overnight stay  To bring an ID card, insurance card and list of medications.    Patient verbalized understanding of instructions.         ----- Message from Evaristo HENRIQUEZ sent at 7/16/2024  3:44 PM EDT -----  Regarding: left heart cath  Scheduled on 7/26/24 showtime is 8:30am with a 9:30am procedure time

## 2024-07-22 ENCOUNTER — APPOINTMENT (OUTPATIENT)
Dept: CARDIOLOGY | Facility: CLINIC | Age: 64
End: 2024-07-22
Payer: MEDICARE

## 2024-07-22 ENCOUNTER — LAB (OUTPATIENT)
Dept: LAB | Facility: LAB | Age: 64
End: 2024-07-22
Payer: MEDICARE

## 2024-07-22 DIAGNOSIS — R94.39 ABNORMAL STRESS TEST: ICD-10-CM

## 2024-07-22 DIAGNOSIS — I20.89 STABLE ANGINA (CMS-HCC): ICD-10-CM

## 2024-07-22 DIAGNOSIS — I20.9 ANGINA PECTORIS (CMS-HCC): ICD-10-CM

## 2024-07-22 LAB
ANION GAP SERPL CALC-SCNC: 10 MMOL/L (ref 10–20)
BUN SERPL-MCNC: 14 MG/DL (ref 6–23)
CALCIUM SERPL-MCNC: 8.8 MG/DL (ref 8.6–10.3)
CHLORIDE SERPL-SCNC: 112 MMOL/L (ref 98–107)
CO2 SERPL-SCNC: 25 MMOL/L (ref 21–32)
CREAT SERPL-MCNC: 0.99 MG/DL (ref 0.5–1.05)
EGFRCR SERPLBLD CKD-EPI 2021: 65 ML/MIN/1.73M*2
ERYTHROCYTE [DISTWIDTH] IN BLOOD BY AUTOMATED COUNT: 14.4 % (ref 11.5–14.5)
GLUCOSE SERPL-MCNC: 93 MG/DL (ref 74–99)
HCT VFR BLD AUTO: 44.9 % (ref 36–46)
HGB BLD-MCNC: 14.7 G/DL (ref 12–16)
MCH RBC QN AUTO: 29.2 PG (ref 26–34)
MCHC RBC AUTO-ENTMCNC: 32.7 G/DL (ref 32–36)
MCV RBC AUTO: 89 FL (ref 80–100)
NRBC BLD-RTO: 0 /100 WBCS (ref 0–0)
PLATELET # BLD AUTO: 226 X10*3/UL (ref 150–450)
POTASSIUM SERPL-SCNC: 4.2 MMOL/L (ref 3.5–5.3)
RBC # BLD AUTO: 5.04 X10*6/UL (ref 4–5.2)
SODIUM SERPL-SCNC: 143 MMOL/L (ref 136–145)
WBC # BLD AUTO: 5.4 X10*3/UL (ref 4.4–11.3)

## 2024-07-22 PROCEDURE — 93000 ELECTROCARDIOGRAM COMPLETE: CPT | Performed by: STUDENT IN AN ORGANIZED HEALTH CARE EDUCATION/TRAINING PROGRAM

## 2024-07-22 PROCEDURE — 80048 BASIC METABOLIC PNL TOTAL CA: CPT

## 2024-07-22 PROCEDURE — 36415 COLL VENOUS BLD VENIPUNCTURE: CPT

## 2024-07-22 PROCEDURE — 85027 COMPLETE CBC AUTOMATED: CPT

## 2024-07-30 ENCOUNTER — TELEPHONE (OUTPATIENT)
Dept: CARDIOLOGY | Facility: CLINIC | Age: 64
End: 2024-07-30
Payer: MEDICARE

## 2024-07-30 ENCOUNTER — TELEPHONE (OUTPATIENT)
Dept: PRIMARY CARE | Facility: CLINIC | Age: 64
End: 2024-07-30
Payer: MEDICARE

## 2024-07-30 NOTE — TELEPHONE ENCOUNTER
Patient called in and stated that she is going through Menopause and is having hot flashes. The patient would like to know can you prescribe medication for that or would she go through her Gynecologist?

## 2024-07-30 NOTE — TELEPHONE ENCOUNTER
Marni guillermina called in today to change the date of her heart cath again this is the 3rd time we have moved it   NEW date and time is 8/19/24 with an arrival time of 6:30am with procedure time at 7:30am

## 2024-08-08 ENCOUNTER — APPOINTMENT (OUTPATIENT)
Dept: OBSTETRICS AND GYNECOLOGY | Facility: CLINIC | Age: 64
End: 2024-08-08
Payer: MEDICARE

## 2024-08-13 ENCOUNTER — APPOINTMENT (OUTPATIENT)
Dept: CARDIOLOGY | Facility: CLINIC | Age: 64
End: 2024-08-13
Payer: MEDICARE

## 2024-08-19 ENCOUNTER — APPOINTMENT (OUTPATIENT)
Dept: CARDIOLOGY | Facility: HOSPITAL | Age: 64
End: 2024-08-19
Payer: MEDICARE

## 2024-08-19 ENCOUNTER — APPOINTMENT (OUTPATIENT)
Dept: CARDIOLOGY | Facility: CLINIC | Age: 64
End: 2024-08-19
Payer: COMMERCIAL

## 2024-08-19 ENCOUNTER — HOSPITAL ENCOUNTER (OUTPATIENT)
Facility: HOSPITAL | Age: 64
Setting detail: OUTPATIENT SURGERY
Discharge: HOME | End: 2024-08-19
Attending: STUDENT IN AN ORGANIZED HEALTH CARE EDUCATION/TRAINING PROGRAM | Admitting: STUDENT IN AN ORGANIZED HEALTH CARE EDUCATION/TRAINING PROGRAM
Payer: MEDICARE

## 2024-08-19 ENCOUNTER — PHARMACY VISIT (OUTPATIENT)
Dept: PHARMACY | Facility: CLINIC | Age: 64
End: 2024-08-19
Payer: COMMERCIAL

## 2024-08-19 VITALS
SYSTOLIC BLOOD PRESSURE: 197 MMHG | DIASTOLIC BLOOD PRESSURE: 101 MMHG | TEMPERATURE: 97.2 F | HEIGHT: 62 IN | WEIGHT: 178 LBS | BODY MASS INDEX: 32.76 KG/M2 | RESPIRATION RATE: 16 BRPM | OXYGEN SATURATION: 98 % | HEART RATE: 62 BPM

## 2024-08-19 DIAGNOSIS — Z95.5 S/P CORONARY ARTERY STENT PLACEMENT: ICD-10-CM

## 2024-08-19 DIAGNOSIS — I25.10 CAD (CORONARY ARTERY DISEASE): ICD-10-CM

## 2024-08-19 DIAGNOSIS — I20.9 ANGINA PECTORIS (CMS-HCC): Primary | ICD-10-CM

## 2024-08-19 LAB
ACT BLD: 305 SEC (ref 83–199)
ACT BLD: 345 SEC (ref 83–199)
ACT BLD: 351 SEC (ref 83–199)
ATRIAL RATE: 65 BPM
ATRIAL RATE: 68 BPM
P AXIS: 48 DEGREES
P AXIS: 56 DEGREES
PR INTERVAL: 157 MS
PR INTERVAL: 164 MS
Q ONSET: 253 MS
Q ONSET: 255 MS
QRS COUNT: 10 BEATS
QRS COUNT: 11 BEATS
QRS DURATION: 96 MS
QRS DURATION: 97 MS
QT INTERVAL: 549 MS
QT INTERVAL: 619 MS
QTC CALCULATION(BAZETT): 585 MS
QTC CALCULATION(BAZETT): 644 MS
QTC FREDERICIA: 572 MS
QTC FREDERICIA: 635 MS
R AXIS: 67 DEGREES
R AXIS: 76 DEGREES
T AXIS: -73 DEGREES
T AXIS: -75 DEGREES
T OFFSET: 527 MS
T OFFSET: 564 MS
VENTRICULAR RATE: 65 BPM
VENTRICULAR RATE: 68 BPM

## 2024-08-19 PROCEDURE — 93005 ELECTROCARDIOGRAM TRACING: CPT | Mod: 59

## 2024-08-19 PROCEDURE — 2500000004 HC RX 250 GENERAL PHARMACY W/ HCPCS (ALT 636 FOR OP/ED): Performed by: NURSE PRACTITIONER

## 2024-08-19 PROCEDURE — 2500000004 HC RX 250 GENERAL PHARMACY W/ HCPCS (ALT 636 FOR OP/ED): Performed by: STUDENT IN AN ORGANIZED HEALTH CARE EDUCATION/TRAINING PROGRAM

## 2024-08-19 PROCEDURE — 7100000010 HC PHASE TWO TIME - EACH INCREMENTAL 1 MINUTE: Performed by: STUDENT IN AN ORGANIZED HEALTH CARE EDUCATION/TRAINING PROGRAM

## 2024-08-19 PROCEDURE — C1894 INTRO/SHEATH, NON-LASER: HCPCS | Performed by: STUDENT IN AN ORGANIZED HEALTH CARE EDUCATION/TRAINING PROGRAM

## 2024-08-19 PROCEDURE — C1760 CLOSURE DEV, VASC: HCPCS | Performed by: STUDENT IN AN ORGANIZED HEALTH CARE EDUCATION/TRAINING PROGRAM

## 2024-08-19 PROCEDURE — 99153 MOD SED SAME PHYS/QHP EA: CPT | Performed by: STUDENT IN AN ORGANIZED HEALTH CARE EDUCATION/TRAINING PROGRAM

## 2024-08-19 PROCEDURE — 99152 MOD SED SAME PHYS/QHP 5/>YRS: CPT | Performed by: STUDENT IN AN ORGANIZED HEALTH CARE EDUCATION/TRAINING PROGRAM

## 2024-08-19 PROCEDURE — C9600 PERC DRUG-EL COR STENT SING: HCPCS | Mod: RC | Performed by: STUDENT IN AN ORGANIZED HEALTH CARE EDUCATION/TRAINING PROGRAM

## 2024-08-19 PROCEDURE — 93010 ELECTROCARDIOGRAM REPORT: CPT | Performed by: INTERNAL MEDICINE

## 2024-08-19 PROCEDURE — 92978 ENDOLUMINL IVUS OCT C 1ST: CPT | Performed by: STUDENT IN AN ORGANIZED HEALTH CARE EDUCATION/TRAINING PROGRAM

## 2024-08-19 PROCEDURE — 85347 COAGULATION TIME ACTIVATED: CPT

## 2024-08-19 PROCEDURE — C1887 CATHETER, GUIDING: HCPCS | Performed by: STUDENT IN AN ORGANIZED HEALTH CARE EDUCATION/TRAINING PROGRAM

## 2024-08-19 PROCEDURE — 2720000007 HC OR 272 NO HCPCS: Performed by: STUDENT IN AN ORGANIZED HEALTH CARE EDUCATION/TRAINING PROGRAM

## 2024-08-19 PROCEDURE — 93458 L HRT ARTERY/VENTRICLE ANGIO: CPT | Performed by: STUDENT IN AN ORGANIZED HEALTH CARE EDUCATION/TRAINING PROGRAM

## 2024-08-19 PROCEDURE — C1769 GUIDE WIRE: HCPCS | Performed by: STUDENT IN AN ORGANIZED HEALTH CARE EDUCATION/TRAINING PROGRAM

## 2024-08-19 PROCEDURE — 7100000009 HC PHASE TWO TIME - INITIAL BASE CHARGE: Performed by: STUDENT IN AN ORGANIZED HEALTH CARE EDUCATION/TRAINING PROGRAM

## 2024-08-19 PROCEDURE — RXMED WILLOW AMBULATORY MEDICATION CHARGE

## 2024-08-19 PROCEDURE — 96373 THER/PROPH/DIAG INJ IA: CPT | Mod: 59 | Performed by: STUDENT IN AN ORGANIZED HEALTH CARE EDUCATION/TRAINING PROGRAM

## 2024-08-19 PROCEDURE — C1874 STENT, COATED/COV W/DEL SYS: HCPCS | Performed by: STUDENT IN AN ORGANIZED HEALTH CARE EDUCATION/TRAINING PROGRAM

## 2024-08-19 PROCEDURE — 2500000005 HC RX 250 GENERAL PHARMACY W/O HCPCS: Performed by: STUDENT IN AN ORGANIZED HEALTH CARE EDUCATION/TRAINING PROGRAM

## 2024-08-19 PROCEDURE — 2550000001 HC RX 255 CONTRASTS: Performed by: STUDENT IN AN ORGANIZED HEALTH CARE EDUCATION/TRAINING PROGRAM

## 2024-08-19 PROCEDURE — 2500000001 HC RX 250 WO HCPCS SELF ADMINISTERED DRUGS (ALT 637 FOR MEDICARE OP): Performed by: STUDENT IN AN ORGANIZED HEALTH CARE EDUCATION/TRAINING PROGRAM

## 2024-08-19 PROCEDURE — 93005 ELECTROCARDIOGRAM TRACING: CPT

## 2024-08-19 PROCEDURE — C1725 CATH, TRANSLUMIN NON-LASER: HCPCS | Performed by: STUDENT IN AN ORGANIZED HEALTH CARE EDUCATION/TRAINING PROGRAM

## 2024-08-19 PROCEDURE — 92928 PRQ TCAT PLMT NTRAC ST 1 LES: CPT | Performed by: STUDENT IN AN ORGANIZED HEALTH CARE EDUCATION/TRAINING PROGRAM

## 2024-08-19 PROCEDURE — C1753 CATH, INTRAVAS ULTRASOUND: HCPCS | Performed by: STUDENT IN AN ORGANIZED HEALTH CARE EDUCATION/TRAINING PROGRAM

## 2024-08-19 PROCEDURE — 2780000003 HC OR 278 NO HCPCS: Performed by: STUDENT IN AN ORGANIZED HEALTH CARE EDUCATION/TRAINING PROGRAM

## 2024-08-19 DEVICE — STENT ONYXNG30026UX ONYX 3.00X26RX
Type: IMPLANTABLE DEVICE | Site: CORONARY | Status: FUNCTIONAL
Brand: ONYX FRONTIER™

## 2024-08-19 RX ORDER — NITROGLYCERIN 5 MG/ML
INJECTION, SOLUTION INTRAVENOUS AS NEEDED
Status: DISCONTINUED | OUTPATIENT
Start: 2024-08-19 | End: 2024-08-19 | Stop reason: HOSPADM

## 2024-08-19 RX ORDER — MIDAZOLAM HYDROCHLORIDE 1 MG/ML
INJECTION, SOLUTION INTRAMUSCULAR; INTRAVENOUS AS NEEDED
Status: DISCONTINUED | OUTPATIENT
Start: 2024-08-19 | End: 2024-08-19 | Stop reason: HOSPADM

## 2024-08-19 RX ORDER — ACETAMINOPHEN 325 MG/1
650 TABLET ORAL EVERY 6 HOURS PRN
Status: DISCONTINUED | OUTPATIENT
Start: 2024-08-19 | End: 2024-08-19 | Stop reason: HOSPADM

## 2024-08-19 RX ORDER — SODIUM CHLORIDE 9 MG/ML
1 INJECTION, SOLUTION INTRAVENOUS CONTINUOUS
Status: DISCONTINUED | OUTPATIENT
Start: 2024-08-19 | End: 2024-08-19 | Stop reason: HOSPADM

## 2024-08-19 RX ORDER — FENTANYL CITRATE 50 UG/ML
INJECTION, SOLUTION INTRAMUSCULAR; INTRAVENOUS AS NEEDED
Status: DISCONTINUED | OUTPATIENT
Start: 2024-08-19 | End: 2024-08-19 | Stop reason: HOSPADM

## 2024-08-19 RX ORDER — SODIUM CHLORIDE 9 MG/ML
100 INJECTION, SOLUTION INTRAVENOUS CONTINUOUS
Status: DISCONTINUED | OUTPATIENT
Start: 2024-08-19 | End: 2024-08-19

## 2024-08-19 RX ORDER — CLOPIDOGREL BISULFATE 75 MG/1
75 TABLET ORAL DAILY
Qty: 90 TABLET | Refills: 3 | Status: SHIPPED | OUTPATIENT
Start: 2024-08-19 | End: 2025-08-19

## 2024-08-19 RX ORDER — ONDANSETRON HYDROCHLORIDE 2 MG/ML
INJECTION, SOLUTION INTRAVENOUS AS NEEDED
Status: DISCONTINUED | OUTPATIENT
Start: 2024-08-19 | End: 2024-08-19 | Stop reason: HOSPADM

## 2024-08-19 RX ORDER — ACETAMINOPHEN 160 MG/5ML
650 SOLUTION ORAL EVERY 6 HOURS PRN
Status: DISCONTINUED | OUTPATIENT
Start: 2024-08-19 | End: 2024-08-19 | Stop reason: HOSPADM

## 2024-08-19 RX ORDER — MORPHINE SULFATE 2 MG/ML
2 INJECTION, SOLUTION INTRAMUSCULAR; INTRAVENOUS EVERY 6 HOURS PRN
Status: DISCONTINUED | OUTPATIENT
Start: 2024-08-19 | End: 2024-08-19 | Stop reason: HOSPADM

## 2024-08-19 RX ORDER — SODIUM CHLORIDE 9 MG/ML
1000 INJECTION, SOLUTION INTRAVENOUS ONCE AS NEEDED
Status: DISCONTINUED | OUTPATIENT
Start: 2024-08-19 | End: 2024-08-19 | Stop reason: HOSPADM

## 2024-08-19 RX ORDER — HEPARIN SODIUM 1000 [USP'U]/ML
INJECTION, SOLUTION INTRAVENOUS; SUBCUTANEOUS AS NEEDED
Status: DISCONTINUED | OUTPATIENT
Start: 2024-08-19 | End: 2024-08-19 | Stop reason: HOSPADM

## 2024-08-19 RX ORDER — CLOPIDOGREL BISULFATE 300 MG/1
TABLET, FILM COATED ORAL AS NEEDED
Status: DISCONTINUED | OUTPATIENT
Start: 2024-08-19 | End: 2024-08-19 | Stop reason: HOSPADM

## 2024-08-19 RX ORDER — ACETAMINOPHEN 650 MG/1
650 SUPPOSITORY RECTAL EVERY 6 HOURS PRN
Status: DISCONTINUED | OUTPATIENT
Start: 2024-08-19 | End: 2024-08-19 | Stop reason: HOSPADM

## 2024-08-19 RX ORDER — HYDRALAZINE HYDROCHLORIDE 20 MG/ML
10 INJECTION INTRAMUSCULAR; INTRAVENOUS ONCE
Status: COMPLETED | OUTPATIENT
Start: 2024-08-19 | End: 2024-08-19

## 2024-08-19 RX ORDER — LIDOCAINE HYDROCHLORIDE 20 MG/ML
INJECTION, SOLUTION INFILTRATION; PERINEURAL AS NEEDED
Status: DISCONTINUED | OUTPATIENT
Start: 2024-08-19 | End: 2024-08-19 | Stop reason: HOSPADM

## 2024-08-19 ASSESSMENT — COLUMBIA-SUICIDE SEVERITY RATING SCALE - C-SSRS
2. HAVE YOU ACTUALLY HAD ANY THOUGHTS OF KILLING YOURSELF?: NO
6. HAVE YOU EVER DONE ANYTHING, STARTED TO DO ANYTHING, OR PREPARED TO DO ANYTHING TO END YOUR LIFE?: NO
1. IN THE PAST MONTH, HAVE YOU WISHED YOU WERE DEAD OR WISHED YOU COULD GO TO SLEEP AND NOT WAKE UP?: NO

## 2024-08-19 ASSESSMENT — PAIN - FUNCTIONAL ASSESSMENT

## 2024-08-19 ASSESSMENT — PAIN SCALES - GENERAL
PAINLEVEL_OUTOF10: 5 - MODERATE PAIN
PAINLEVEL_OUTOF10: 0 - NO PAIN
PAINLEVEL_OUTOF10: 0 - NO PAIN
PAINLEVEL_OUTOF10: 5 - MODERATE PAIN
PAINLEVEL_OUTOF10: 0 - NO PAIN
PAINLEVEL_OUTOF10: 2
PAINLEVEL_OUTOF10: 0 - NO PAIN

## 2024-08-19 NOTE — DISCHARGE INSTRUCTIONS
We are starting you on Plavix (Clopidogrel) this medication will keep your stent open.     You will take this medication with aspirin and eliquis for 1 month. After 1 month you will stop taking the aspirin and continue with Eliquis and Plavix.     You can restart your Eliquis on 8/20/24.     You can restart the viagra on Wednesday 8/21/24     If you have any questions, please call the Cath Lab Nurse Practitioner Anna Chen at [phone] and leave a message. She will return your call the same day if calling before 3 PM, M-F. If you call on the weekend you can expect a call back on Monday morning. You may also call the Cath Lab at [phone] M-F, 7-3:30 with any questions. Weekends and after hours please call your cardiologist office number to reach a physician on call. The heart group office number is [phone]           CARDIAC CATHETERIZATION DISCHARGE INSTRUCTIONS     FOR SUDDEN AND SEVERE CHEST PAIN, SHORTNESS OF BREATH, EXCESSIVE BLEEDING, SIGNS OF STROKE, OR CHANGES IN MENTAL STATUS YOU SHOULD CALL 911 IMMEDIATELY.     If your provider has prescribed aspirin and/or clopidogrel (Plavix), or prasugrel (Effient), or ticagrelor (Brilinta), DO NOT STOP THESE MEDICATIONS for any reason without talking to your cardiologist first. If any of these were prescribed, you must take them every day without missing a single dose. If you are getting low on these medications, contact your provider immediately for a refill.     FOR NEXT 24 HOURS  - Upon discharge, you should return home and rest for the remainder of the day and evening. You do not have to stay on bed rest but should not be very active.  It is recommended a responsible adult be with you for the first 24 hours after the procedure.    - No driving for 24 hours after procedure. Please arrange for someone to drive you home from the hospital today.     - Do not drive, operate machinery, or use power tools for 24 hours after your procedure.     - Do not make any  legal decisions for 24 hours after your procedure.     - Do not drink alcoholic beverages for 24 hours after your procedure.    WOUND CARE   *FOR FEMORAL (LEG) ACCESS*  ·      Avoid heavy lifting (over 10 pounds) for 7 days, squatting or excessive bending for 2 days, and strenuous exercise for 7 days.  ·      No submerged bathing, swimming, or hot tubs for the next 7 days, or until fully healed.  ·      Avoid sexual activity for 3-4 days until any groin discomfort has ceased.     *FOR RADIAL (WRIST) ACCESS*  ·      No lifting more than 5 pounds or excessive use of the wrist for 24 hours - for example, treat your wrist as if it is sprained.  ·      Do not engage in vigorous activities (tennis, golf, bowling, weights) for at least 48 hours after the procedure.  ·      Do not submerge the wrist for 7 days after the procedure.  ·      You should expect mild tingling in your hand and tenderness at the puncture site for up to 3 days.    - The transparent dressing should be removed from the site 24 hours after the procedure.  Wash the site gently with soap and water. Rinse well and pat dry. Keep the area clean and dry. You may apply a Band-Aid to the site. Avoid lotions, ointments, or powders until fully healed.     - You may shower the day after your procedure.      - It is normal to notice a small bruise around the puncture site and/or a small grape sized or smaller lump. Any large bruising or large lump warrants a call to the office.     - If bleeding should occur, lay down and apply pressure to the affected area for 10 minutes.  If the bleeding stops notify your physician.  If there is a large amount of bleeding or spurting of blood CALL 911 immediately.  DO NOT drive yourself to the hospital.    - You may experience some tenderness, bruising or minimal inflammation.  If you have any concerns, you may contact the Cath Lab or if any of these symptoms become excessive, contact your cardiologist or go to the emergency  room.     OTHER INSTRUCTIONS  - You may take acetaminophen (Tylenol) as directed for discomfort.  If pain is not relieved with acetaminophen (Tylenol), contact your doctor.    - If you notice or experience any of the following, you should notify your doctor or seek medical attention  Chest pain or discomfort  Change in mental status or weakness in extremities.  Dizziness, light headedness, or feeling faint.  Change in the site where the procedure was performed, such as bleeding or an increased area of bruising or swelling.  Tingling, numbness, pain, or coolness in the leg/arm beyond the site where the procedure was performed.  Signs of infection (i.e. shaking chills, temperature > 100 degrees Fahrenheit, warmth, redness) in the leg/arm area where the procedure was performed.  Changes in urination   Bloody or black stools  Vomiting blood  Severe nose bleeds  Any excessive bleeding    - If you DO NOT have an appointment with your cardiologist within 2-4 weeks following your procedure, please contact their office.      Important ways to reduce your risk of coronary artery disease by healthy diet, maintaining a healthy weight, exercising regularly and stop using tobacco products.     Mediterranean Diet    About this topic  This is a heart healthy diet. It is based on widely used foods and cooking styles from many countries around the [place]. The main pattern for the diet is more plant foods and monounsaturated fats, or good fats, like olive oil. Protein in this diet comes from seafood, legumes, nuts, seeds, and poultry and eggs in lowered amounts. You will also eat more whole grains, vegetables, and fruits and moderate amounts of alcohol are also included. This diet has less red meats, dairy products, and processed foods.    What will the results be?  Your diet will have less saturated fat, cholesterol, calories, sodium, and added sugars. Your diet will be higher in fiber. This will help to keep your blood  sugar steady. This diet lowers the chance of heart disease and other health problems.  What lifestyle changes are needed?  If you do not often eat this way, you will need to change your eating habits. Be sure to get regular exercise. It is believed to help the health benefits of this diet.  What changes to diet are needed?  You may need to limit the amount of red meat and processed foods in your diet. Ask your dietitian for help planning meals that are right for you.  What foods are good to eat?  Plenty of fish and other seafood  Fresh, frozen, or canned fruits and vegetables  Nuts and nut butters and dried beans, lentils, or peas  Foods high in fiber like whole grains and whole grain products  Olive oil (good fat), peanut or canola oil, margarine, or spreads that list vegetable oil as the first ingredient and do not contain trans fat or partially hydrogenated oil  Small amounts of poultry and eggs  What foods should be limited or avoided?  Red meats  Sweets, desserts, and processed foods  Butter, oils, and fats that contain trans fats or are hydrogenated or partially hydrogenated  Gravies and sauces  What problems could happen?  Your weight may rise because your diet will be higher in fat from olive oil and nuts.  You may have lower iron levels. Be sure to eat foods rich in iron. Also, eat foods rich in vitamin C. This will help your body take in iron.  You may have lower calcium levels because you are eating less dairy products. Ask your doctor if you need to take a calcium supplement.  Wine is often thought of as part of a Mediterranean diet. It is not needed and you may choose not to include it. Avoid wine if you are prone to alcohol abuse or are pregnant. Also, avoid it if you are at risk for breast cancer, have liver problems, or have other illnesses that make it important for you to not have alcohol.  When do I need to call the doctor?  If you have any concerns about your diet     Health risks of  "obesity    The Basics  Written by the doctors and editors at [facility]  What does it mean to have obesity? -- Doctors use a calculation called \"body mass index,\" or \"BMI,\" to decide whether a person is underweight, at a healthy weight, overweight, or has obesity.  Your BMI will tell you which category you are in based on your weight and height (figure 1):  ?If your BMI is between 25 and 29.9, you are overweight.  ?If your BMI is 30 or greater, you have obesity.  Obesity is a problem, because it increases the risks of many different health problems. It can also make it hard for you to move, breathe, and do other things that people who are at a healthy weight can do easily.  What are the health risks of obesity? -- Having obesity increases a person's risk of developing many health problems. Here are just a few examples:  ?Diabetes  ?High blood pressure  ?High cholesterol  ?Heart disease (including heart attacks)  ?Stroke  ?Sleep apnea (a disorder in which you stop breathing for short periods while asleep)  ?Asthma  ?Cancer  Does having obesity shorten a person's life? -- Yes. Studies show that people with obesity die younger than people who are a healthy weight. They also show that the risk of death goes up the heavier a person is. The degree of increased risk depends on how long the person has had obesity, and on what other medical problems they have.  People with \"central obesity\" might also be at risk of dying younger. Central obesity means carrying extra weight in the belly area, even if the BMI is normal.  Should I see an expert? -- Yes. If you are overweight or have obesity, you can talk to your doctor or nurse. They might have suggestions for healthy ways to lose weight. It can also help to work with a dietitian (food and nutrition expert). A dietitian can help you choose healthy foods and plan meals.  Are there medical treatments that can help me lose weight? -- Yes. There are medicines and surgery to help with " weight loss. But those treatments are only for people who have not been able to lose weight through lifestyle changes such as diet and exercise. Also, weight loss treatments do not take the place of diet and exercise. People who have those treatments must also change how they eat and how active they are.  What can I do to prevent the problems caused by obesity? -- The best thing that you can do is lose weight. But even if weight loss is not possible, you can improve your health and lower your risk if you:  ?Become more active - Many types of physical activity can help, including walking. You can start with a few minutes a day and add more as you get stronger and build up your endurance. Anything that gets your body moving is good for you.  ?Improve your diet - It is healthy to have regular meal times, eat smaller portions, and not skip meals. Limit sweets, and avoid processed foods. Try to eat more vegetables and fruits instead.  ?Quit smoking (if you smoke) - Some people start eating more after they stop smoking, so try to make healthy food choices. Even if it increases your appetite, quitting smoking is still one of the best things that you can do to improve your health.  ?Limit alcohol - For females, drink no more than 1 drink a day. For males, drink no more than 2 drinks a day.  What causes obesity? -- The thing that increases a person's risk the most is having an unhealthy lifestyle. Most people develop obesity because they eat too much, eat unhealthy foods, and move too little. That's especially true of people who watch too much TV. But there are also other things that seem to increase the risk of obesity that many people do not know about. Here are some things that might affect a person's chance of developing obesity:  ?Mother's habits during and after pregnancy - People who eat a lot of calories, have diabetes, or smoke during pregnancy have a higher chance of having babies who have obesity as adults. Also,  "babies who drink formula might be more likely than  babies to develop obesity later in life.  ?Habits and weight gain during childhood - Children or teens who are overweight or have obesity are more likely to become have obesity as an adult.  ?Sleeping too little - People who do not get enough sleep are more likely to develop obesity than people who sleep enough.  ?Taking certain medicines - Long-term use of certain medicines, such as some medicines to treat depression, can cause weight gain. If you are concerned that 1 of your medicines might be making you gain weight, talk to your doctor or nurse. They might be able to switch you to a different medicine instead.  ?Certain hormonal conditions - Some hormonal problems can increase the risk of developing obesity. For example, a condition called \"polycystic ovary syndrome\" can cause weight gain, along with other symptoms like irregular periods.  What if I want to get pregnant? -- If you are overweight or have obesity, it might be harder to get pregnant. For males, obesity can also cause sex problems, like having trouble getting or keeping an erection. This is more likely if you also have high blood pressure or diabetes.  What if my child has obesity? -- In children, obesity has many of the same risks as it does in adults. For example, it can increase the risk of diabetes, high blood pressure, asthma, and sleep apnea. It can also cause added problems related to childhood. For example, obesity can make children grow faster than normal and cause girls to go through puberty earlier than usual.  All topics are updated as new evidence becomes available and our peer review process is complete.  This topic retrieved from [employer] on: Jan 11, 2024.  Topic 67703 Version 17.0  Release: 31.6.4 - C32.10  © 2024 UpToDate, Inc. and/or its affiliates. All rights reserved.  figure 1: Your body mass index (BMI)        If you use tobacco products please review   Quitting " "smoking    The Basics  Written by the doctors and editors at [facility]  What are the benefits of quitting smoking? -- Quitting smoking can dramatically improve your health and help you live longer. It lowers your risk of heart disease, lung disease, kidney failure, cancer, infection, stomach problems, and diabetes.  Quitting smoking can also lower your chances of getting osteoporosis, a condition that makes your bones weak.  Quitting is not easy for most people, and it might take several tries to completely quit. But help and support are available. Quitting smoking will improve your health no matter how old you are, even if you have smoked for a long time.  What should I do if I want to quit smoking? -- It's a good idea to start by talking with your doctor or nurse. It is possible to quit on your own, without help. But getting help greatly increases your chances of quitting successfully.  When you are ready to quit, you will make a plan to:  ?Set a quit date.  ?Tell your family and friends that you plan to quit.  ?Plan ahead for the challenges you will face, such as cigarette cravings.  ?Remove cigarettes from your home, car, and work.  How can my doctor or nurse help? -- Your doctor or nurse can give you advice on the best way to quit. They can also give you medicines to:  ?Reduce your craving for cigarettes  ?Reduce your \"withdrawal\" symptoms (symptoms that happen when you stop smoking)  Your doctor or nurse can also help you find a counselor to talk to. For most people who are trying to quit smoking, it works best to use both medicines and counseling.  You can also get help from a free phone line (5-760-QUIT-NOW, or 1-432.430.5753) or go online to www.smokefree.gov.  What are the symptoms of withdrawal? -- When you stop smoking, you might have symptoms such as:  ?Trouble sleeping  ?Feeling irritable, anxious, or restless  ?Getting frustrated or angry  ?Having trouble thinking clearly  These symptoms can be hard to " deal with, which is why it can be so hard to quit. But medicines can help.  Some people who stop smoking become temporarily depressed. Some people need treatment for depression, such as counseling or medicines or both. People with depression might:  ?No longer enjoy or care about doing the things they used to like to do  ?Feel sad, down, hopeless, nervous, or cranky most of the day, almost every day  ?Lose or gain weight  ?Sleep too much or too little  ?Feel tired or like they have no energy  ?Feel guilty or like they are worth nothing  ?Forget things or feel confused  ?Move and speak more slowly than usual  ?Act restless or have trouble staying still  ?Think about death or suicide  If you think you might be depressed, tell your doctor or nurse right away. They can talk to you about your symptoms and recommend treatment if needed.  Get help right away if you are thinking of hurting or killing yourself! -- Sometimes, people with depression think of hurting or killing themselves. If you ever feel like you might hurt yourself, help is available:  ?In the , contact the [facility] [facility] Lifeline:  To speak to someone, call or text [employer].  To talk to someone online, go to www.[employer].org/chat.  ?Call your doctor or nurse and tell them that it is an emergency.  ?Call for an ambulance (in the [place] and [place], call 9-1-1).  ?Go to the emergency department at your local hospital.  If you think your partner might have depression, or if you are worried that they might hurt themselves, get them help right away.  How does counseling work? -- A counselor can help you figure out:  ?What triggers you to want to smoke, and how to handle these situations  ?How to resist cravings  ?What you can do differently if you have tried to quit before  You can meet with a counselor in 1-on-1 sessions or as part of a group. There are other ways to get counseling, too, such as over the phone, through text messaging, or online.  How do  "medicines help you stop smoking? -- Different medicines work in different ways:  ?Nicotine replacement therapy - Nicotine is the main drug in cigarettes, and the reason they are addictive. These medicines reduce your body's craving for nicotine. They also help with withdrawal symptoms.  There are different forms of nicotine replacement, including skin patches, lozenges, gum, nasal sprays, and inhalers. Most can be bought without a prescription. Also, health insurance might cover some or all of the cost.  It often helps to use 2 forms of nicotine replacement. For example, you might wear a patch all the time, plus use gum or lozenges when you get a craving to smoke.  ?Varenicline - Varenicline (brand names: Chantix, Champix) is a prescription medicine that reduces withdrawal symptoms and cigarette cravings. Varenicline can increase the effects of alcohol in some people. It's a good idea to limit drinking while you're taking it, at least until you know how it affects you.  Even if you are not yet ready to commit to a quit date, varenicline can help reduce cravings. This can make it easier to quit when you are ready.  ?Bupropion - Bupropion (sample brand names: Wellbutrin, Zyban) is a prescription medicine that reduces your desire to smoke. It is also available in a generic version, which is cheaper than the brand name medicines. Doctors do not usually prescribe bupropion for people with seizures or who have had seizures in the past.  It might also be helpful to combine nicotine replacement with bupropion or varenicline. In some cases, a person might even take both bupropion and varenicline. Your doctor or nurse can help you figure out the best combination for you.  What about e-cigarettes? -- Sometimes people wonder if using electronic cigarettes, or \"e-cigarettes,\" can help them quit smoking. Using e-cigarettes is also called \"vaping.\" Doctors do not recommend e-cigarettes in place of using of medicines and " counseling. That's because e-cigarettes still contain nicotine as well as other substances that might be harmful. It's also not clear how they can affect a person's health in the long term.  What if I am pregnant and I smoke? -- If you are pregnant, it's really important for the health of your baby that you quit. Ask your doctor what options you have, and what is safest for your baby.  What if I have already smoked for a long time? -- The longer you have smoked, the higher your chances are of having health problems. But it is never too late to quit smoking. It helps your health even if you are older or have smoked for many years. The best thing you can do to lower your chance of having a health problem caused by smoking is to quit.  Will I gain weight if I quit? -- You might gain a few pounds. This can be frustrating for some people. But it's important to remember that you are improving your health by quitting smoking. You can help prevent gaining a lot of weight by staying active and eating a healthy diet.  What if I am not able to quit? -- If you don't quit on your first try, or if you quit but then start smoking again, don't give up hope. Lots of people have to try more than once before they are able to completely quit.  It might help to try to understand why quitting did not work. There might be something you can do differently when you try again. It can help to figure out which situations make you want to smoke, so you can avoid them.  What else can I do to improve my chances of quitting? -- You can:  ?Get regular exercise. Any type of physical activity, even gentle forms of movement, is good for your health. Physical activity can also help reduce stress.  ?Stay away from people who smoke and places that make you want to smoke. If people close to you smoke, ask them to quit with you or avoid smoking around you.  ?Carry gum, hard candy, or something to put in your mouth. If you get a craving for a cigarette, try  1 of these instead.  ?Don't give up, even if you start smoking again. It takes most people a few tries before they succeed.  All topics are updated as new evidence becomes available and our peer review process is complete.

## 2024-08-19 NOTE — Clinical Note
Vessel: RCA (mid). Stent inserted. Inflation 1: Pressure = 12 raciel; Duration = 12 sec. 3.0 x 12 meena

## 2024-08-19 NOTE — Clinical Note
Angioplasty of the middle right coronary artery lesion. Inflation 1: Pressure = 20 raciel; Duration = 12 sec. Inflation 2: Pressure = 10 raciel; Duration = 8 sec. Inflation 3: Pressure = 20 raciel; Duration = 8 sec. Inflation 4: Pressure = 20 raciel; Duration = 11 sec.

## 2024-08-19 NOTE — PRE-SEDATION DOCUMENTATION
"Interventional Cardiology Preprocedure Note    Marni Lugo   Indication for procedure: The encounter diagnosis was Angina pectoris (CMS-HCC). Here for [facility] r/o obstructive CAD    Relevant review of systems: NA      /78   Pulse 68   Temp 36.2 °C (97.2 °F) (Temporal)   Resp 16   Ht 1.562 m (5' 1.5\")   Wt 80.7 kg (178 lb)   SpO2 96%   BMI 33.09 kg/m²    Relevant Labs:   Lab Results   Component Value Date    CREATININE 0.99 07/22/2024    EGFR 65 07/22/2024    INR 2.40 05/28/2024    PROTIME 38.8 (H) 03/24/2024       Planned Sedation/Anesthesia: Moderate    Airway assessment: normal    Physical Exam  Constitutional:       Appearance: Normal appearance.   HENT:      Head: Normocephalic.      Nose: Nose normal.   Eyes:      General: No scleral icterus.  Cardiovascular:      Rate and Rhythm: Normal rate and regular rhythm.      Pulses: Normal pulses.      Heart sounds: Normal heart sounds.   Pulmonary:      Effort: Pulmonary effort is normal.      Breath sounds: Normal breath sounds.   Abdominal:      Palpations: Abdomen is soft.   Musculoskeletal:      Cervical back: Normal range of motion.   Skin:     General: Skin is warm and dry.      Capillary Refill: Capillary refill takes less than 2 seconds.   Neurological:      General: No focal deficit present.      Mental Status: She is alert and oriented to person, place, and time.   Psychiatric:         Mood and Affect: Mood normal.         Behavior: Behavior normal.         Thought Content: Thought content normal.         Judgment: Judgment normal.         Mallampati: II (hard and soft palate, upper portion of tonsils and uvula visible)    ASA Score: ASA 3 - Patient with moderate systemic disease with functional limitations    Benefits, risks and alternatives of procedure and planned sedation have been discussed with the patient and/or their representative. All questions answered and they agree to proceed.     Stephani Chen, APRN-CNP   "

## 2024-08-19 NOTE — Clinical Note
Angioplasty of the middle right coronary artery lesion. Inflation 1: Pressure = 22 raciel; Duration = 10 sec.

## 2024-08-19 NOTE — Clinical Note
Angioplasty of the right coronary artery lesion. Inflation 1: Pressure = 12 raciel; Duration = 10 sec. Inflation 2: Pressure = 12 raciel; Duration = 6 sec. CT scan of the chest revealed 7 mm nodule. The patient repeated the test done in 6 months. Patient has a copy of the CT scan report

## 2024-08-19 NOTE — Clinical Note
Angioplasty of the middle right coronary artery lesion. Inflation 1: Pressure = 20 raciel; Duration = 12 sec. Inflation 2: Pressure = 18 raciel; Duration = 6 sec. Inflation 3: Pressure = 22 raciel; Duration = 11 sec. Inflation 4: Pressure = 20 raciel; Duration = 8 sec.

## 2024-08-19 NOTE — POST-PROCEDURE NOTE
Physician Transition of Care Summary  Invasive Cardiovascular Lab    Procedure Date: 8/19/2024  Attending:    Tasia He - Primary  Resident/Fellow/Other Assistant: Surgeons and Role:  * No surgeons found with a matching role *    Indications:   Pre-op Diagnosis      * Angina pectoris (CMS-HCC) [I20.9]    Post-procedure diagnosis:   Post-op Diagnosis     * Angina pectoris (CMS-HCC) [I20.9]    Procedure(s):   Left Heart Cath, With LV  52618 - [place] CATH PLMT L HRT & ARTS W/NJX & ANGIO IMG S&I    PCI SHA Stent- Coronary    IVUS - Coronary  40320 - [place] ENDOLUMINAL CORONARY IVUS OCT I&R INITIAL VESSEL        Procedure Findings:   90% lesion of the RCA, PDA with 70% lesion, small caliber vessel, not a target for intervention, 40% proximal LAD lesion and 30% mid LAD lesion, D3- severe distal diagonal disease, small caliber vessel not a target for intervention. LVEDP 15   See full report for details.     Description of the Procedure:   [facility] with IVUS guided PCI of the RCA with one SHA 3 X 26   Please see full report for details    Complications:   none    Stents/Implants:   Implants       Stent    Stent, Jake Bradford Sha, 3.00 X 26rx - [id number] - Implanted        Inventory item: STENT, JAKE FRONTIER SHA, 3.00 X 26RX Model/Cat number: [id number]    : [employer] Lot number: [id number]    Device identifier: [id number]        As of 8/19/2024       Status: Implanted                              Anticoagulation/Antiplatelet Plan:   Triple therapy for one month and then stop aspirin and continue Eliquis and Plavix for at least 5 months additional months    Estimated Blood Loss:   * No values recorded between 8/19/2024  7:35 AM and 8/19/2024  8:45 AM *    Anesthesia: Moderate Sedation Anesthesia Staff: No anesthesia staff entered.    Any Specimen(s) Removed:   No specimens collected during this procedure.    Disposition:   Recovery and home later on today   Medical therapy-high intensity statins &  BB  Triple therapy for one month and then stop aspirin and continue Eliquis and Plavix for at least 5 months additional months        Electronically signed by: CEDRIC Browning-CNP, 8/19/2024 8:45 AM

## 2024-08-19 NOTE — CARE PLAN
Met the patient for phase 1 cardiac rehab consult. Explained that cardiac rehab is a 36 visit exercise and education program to help strengthen the heart following a recent cardiac event by increasing exercise tolerance and building strength and endurance. Explained education is provided on therapeutic lifestyle changes throughout the program. We discussed rehab sessions being held three times per week on Monday, Wednesday. and Friday. Business card and brochure given to patient. Explained that we would contact patient once they have their follow up appointment with the cardiologist. Any questions were answered.

## 2024-08-19 NOTE — H&P
History Of Present Illness  Marni Lugo is a 63 y.o. female presenting with chest pain and risk factors of CAD. Patient with indeterminate stress test.      Past Medical History  Past Medical History:   Diagnosis Date    Abnormal finding of blood chemistry, unspecified 04/14/2014    Abnormal blood chemistry    Acute bronchospasm 04/18/2016    Bronchospasm    Acute candidiasis of vulva and vagina 07/21/2013    Vaginitis due to Candida albicans    Acute embolism and thrombosis of unspecified vein 02/18/2014    Venous thrombosis    Acute vaginitis 02/11/2016    Bacterial vaginosis    Adjustment disorder with mixed anxiety and depressed mood 04/06/2023    Age-related nuclear cataract, bilateral 10/18/2022    Nuclear sclerosis of both eyes    Anesthesia of skin 03/16/2018    Numbness and tingling in both hands    Bitten or stung by nonvenomous insect and other nonvenomous arthropods, initial encounter 09/01/2016    Bug bite, initial encounter    Carpal tunnel syndrome 04/06/2023    Cervical lymphadenitis 04/06/2023    Chronic pain of left ankle 04/06/2023    Diverticulitis of large intestine without perforation or abscess without bleeding 07/21/2013    Diverticulitis of colon    Dry eye syndrome of unspecified lacrimal gland 10/18/2022    Dry eye syndrome    Encounter for follow-up examination after completed treatment for conditions other than malignant neoplasm 11/18/2015    Hospital discharge follow-up    Encounter for immunization 02/20/2016    Immunization due    Encounter for other preprocedural examination 10/09/2015    Pre-operative clearance    Encounter for screening for infections with a predominantly sexual mode of transmission 03/16/2018    Screening for STD (sexually transmitted disease)    Encounter for screening for infections with a predominantly sexual mode of transmission 02/28/2018    Screening for STD (sexually transmitted disease)    Encounter for screening for infections with a predominantly  sexual mode of transmission 02/28/2018    Screening for STD (sexually transmitted disease)    Encounter for screening for malignant neoplasm of colon 11/23/2020    Encounter for screening colonoscopy    Excessive and frequent menstruation with irregular cycle 07/21/2013    Metrorrhagia    Facial cellulitis 05/17/2023    Follicular disorder, unspecified 04/02/2021    Hemorrhage of anus and rectum 05/09/2014    Rectal hemorrhage    Hot flashes, menopausal 04/06/2023    Hyperlipidemia, unspecified 07/21/2013    Hyperlipidemia    Impetigo, unspecified 04/02/2021    Incarcerated umbilical hernia 04/06/2023    Surgery 4/1/23. Patient doing well. Dr. Tawanda Munoz.     Inflamed seborrheic keratosis 04/02/2021    Lateral epicondylitis, unspecified elbow 03/16/2018    Tennis elbow    Lateral epicondylitis, unspecified elbow 02/28/2018    Tennis elbow    Lateral epicondylitis, unspecified elbow 02/28/2018    Tennis elbow    Left sided colitis without complications (Multi) 07/21/2013    Ulcerative colitis, left sided    Lumbosacral spondylosis 04/06/2023    Myopia, bilateral 10/18/2022    Myopia of both eyes with astigmatism and presbyopia    Nevus of left conjunctiva 04/06/2023    Obstructive sleep apnea (adult) (pediatric) 07/21/2013    Obstructive sleep apnea    Other chest pain 05/08/2019    Chest tightness    Other conditions influencing health status 12/28/2020    Colonoscopy planned    Other conditions influencing health status 05/08/2019    History of cough    Other enthesopathies, not elsewhere classified 05/06/2014    Tendinitis of right shoulder    Other hypersomnia 11/08/2017    Excessive daytime sleepiness    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    Protein S deficiency    Other specified abnormal findings of blood chemistry 10/07/2016    Elevated serum creatinine    Other symptoms and signs involving the genitourinary system 11/23/2020    Abnormal urogenital discharge    Other symptoms and signs  involving the nervous system 04/13/2017    Suspected sleep apnea    Personal history of diseases of the blood and blood-forming organs and certain disorders involving the immune mechanism 10/18/2022    History of sarcoidosis    Personal history of diseases of the skin and subcutaneous tissue     History of dermatitis    Personal history of other diseases of the circulatory system 07/10/2020    History of congestive heart failure    Personal history of other diseases of the female genital tract 10/26/2020    History of abnormal uterine bleeding    Personal history of other diseases of the musculoskeletal system and connective tissue 01/16/2018    History of lateral epicondylitis    Personal history of other diseases of the respiratory system 07/21/2013    History of acute bronchitis    Personal history of other diseases of the respiratory system 04/14/2014    Personal history of sinusitis    Personal history of other diseases of urinary system 02/02/2022    History of hematuria    Personal history of other diseases of urinary system 11/01/2021    History of hematuria    Personal history of other infectious and parasitic diseases 04/14/2014    History of trichomoniasis    Personal history of other infectious and parasitic diseases     History of herpes simplex infection    Personal history of other specified conditions 07/21/2020    History of chest pain    Personal history of other specified conditions 01/18/2017    History of dysuria    Personal history of other specified conditions 11/23/2020    History of diarrhea    Personal history of other specified conditions 07/14/2022    History of itching    Personal history of other specified conditions 10/14/2020    History of dyspnea    Personal history of other specified conditions 06/18/2016    History of excessive sweating    Personal history of other specified conditions 07/26/2016    History of diarrhea    Personal history of other specified conditions 06/18/2016     History of nausea    Personal history of peptic ulcer disease     History of peptic ulcer    Personal history of pulmonary embolism 08/21/2020    History of pulmonary embolism    Personal history of urinary (tract) infections 11/05/2015    History of urinary tract infection    Piriformis syndrome of right side 04/06/2023    Postnasal drip 04/14/2014    Post-nasal drainage    Primary osteoarthritis of first carpometacarpal joint of left hand 04/06/2023    Rash and other nonspecific skin eruption 07/14/2022    Rash    Right upper quadrant abdominal tenderness 05/22/2017    Abdominal tenderness, right upper quadrant    Spondylolisthesis, acquired 04/06/2023    Type 2 diabetes mellitus (Multi) 11/21/2022    Formatting of this note might be different from the original. 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    Ulcerative blepharitis    Ulnar neuropathy at elbow of right upper extremity 04/06/2023    Unspecified exophthalmos 07/21/2013    Exophthalmos    Unspecified exophthalmos 10/18/2022    Ocular proptosis    Urinary tract infection, site not specified 04/14/2014    Acute UTI    Vascular disorder of intestine, unspecified (Multi) 07/21/2013    Ischemic colitis       Surgical History  Past Surgical History:   Procedure Laterality Date    CT ABDOMEN PELVIS ANGIOGRAM W AND/OR WO IV CONTRAST  11/12/2015    CT ABDOMEN PELVIS ANGIOGRAM W AND/OR WO IV CONTRAST 11/12/2015 [facility] CLINICAL LEGACY    CT ABDOMEN PELVIS ANGIOGRAM W AND/OR WO IV CONTRAST  5/8/2022    CT ABDOMEN PELVIS ANGIOGRAM W AND/OR WO IV CONTRAST 5/8/2022 DOCTOR OFFICE LEGACY    CT ABDOMEN PELVIS ANGIOGRAM W AND/OR WO IV CONTRAST  4/1/2023    CT ABDOMEN PELVIS ANGIOGRAM W AND/OR WO IV CONTRAST [facility] CT    HYSTEROSCOPY   "05/09/2014    Hysteroscopy With Endometrial Ablation    OTHER SURGICAL HISTORY  04/07/2014    Left Hemicolectomy    TONSILLECTOMY  04/07/2014    Tonsillectomy    TOTAL VAGINAL HYSTERECTOMY  09/11/2014    Vaginal Hysterectomy    TUBAL LIGATION  04/07/2014    Tubal Ligation        Social History  She reports that she quit smoking about 30 years ago. Her smoking use included cigarettes. She started smoking about 34 years ago. She has a 1 pack-year smoking history. She has been exposed to tobacco smoke. She has never used smokeless tobacco. She reports current alcohol use of about 2.0 standard drinks of alcohol per week. She reports that she does not use drugs.    Family History  Family History   Problem Relation Name Age of Onset    Heart disease Mother      Kidney failure Father      Cirrhosis Sister      Breast cancer Neg Hx          Allergies  Adhesive and Latex    Review of Systems     Physical Exam   General: Alert and Oriented, No distress, cooperative  Head: Normocephalic without obvious abnormality, atraumatic  Eyes: Conjunctiva/corneas clear, EOM's grossly intact  Neck: Supple, trachea midline, No thyroid enlargement/tenderness/nodules; No JVD  Lungs: Clear to auscultation bilaterally, no wheezes, rhonci, or rales. respirations unlabored  Heart: Regular rhythm, normal S1/S2, no murmur  Abdomen: Soft, non-tender, Non-distended, bowel sounds active  Extremities: No edema, no cyanosis, no clubbing  Skin: Skin color, texture, turgor normal.  No rashes or lesions noted  Neurologic: Alert and oriented x 3, grossly moving all extremities, speech intact    Last Recorded Vitals  Blood pressure (!) 197/101, pulse 62, temperature 36.2 °C (97.2 °F), temperature source Temporal, resp. rate 16, height 1.562 m (5' 1.5\"), weight 80.7 kg (178 lb), SpO2 98%.    Relevant Results             Assessment/Plan   Assessment & Plan  Angina pectoris (CMS-HCC)      Will proceed with LHC and possible PCI as indicated.    "           Paul He MD

## 2024-08-20 ENCOUNTER — TELEPHONE (OUTPATIENT)
Dept: CARDIOLOGY | Facility: HOSPITAL | Age: 64
End: 2024-08-20
Payer: MEDICARE

## 2024-08-20 NOTE — TELEPHONE ENCOUNTER
8/20/24  1623  Spoke with Dr. Dunbar from [facility] regarding patient's authorization for PCI.    After reviewing heart cath and specific interventions done during cath with Dr. Dunbar was informed by Dr. Dunbar that PCI procedure authorized from the date of 8/19/24 for [facility].    Authorization number:  [id number]

## 2024-08-21 ENCOUNTER — APPOINTMENT (OUTPATIENT)
Dept: PRIMARY CARE | Facility: CLINIC | Age: 64
End: 2024-08-21
Payer: MEDICARE

## 2024-08-22 ENCOUNTER — LAB (OUTPATIENT)
Dept: LAB | Facility: LAB | Age: 64
End: 2024-08-22
Payer: MEDICARE

## 2024-08-22 DIAGNOSIS — I25.10 CAD (CORONARY ARTERY DISEASE): ICD-10-CM

## 2024-08-22 DIAGNOSIS — N18.31 CHRONIC KIDNEY DISEASE, STAGE 3A (MULTI): Primary | ICD-10-CM

## 2024-08-22 DIAGNOSIS — Z95.5 S/P CORONARY ARTERY STENT PLACEMENT: ICD-10-CM

## 2024-08-22 LAB
ALBUMIN SERPL BCP-MCNC: 3.9 G/DL (ref 3.4–5)
ANION GAP SERPL CALC-SCNC: 10 MMOL/L (ref 10–20)
BUN SERPL-MCNC: 13 MG/DL (ref 6–23)
CALCIUM SERPL-MCNC: 8.9 MG/DL (ref 8.6–10.3)
CHLORIDE SERPL-SCNC: 112 MMOL/L (ref 98–107)
CO2 SERPL-SCNC: 24 MMOL/L (ref 21–32)
CREAT SERPL-MCNC: 1.08 MG/DL (ref 0.5–1.05)
EGFRCR SERPLBLD CKD-EPI 2021: 58 ML/MIN/1.73M*2
ERYTHROCYTE [DISTWIDTH] IN BLOOD BY AUTOMATED COUNT: 14.6 % (ref 11.5–14.5)
GLUCOSE SERPL-MCNC: 106 MG/DL (ref 74–99)
HCT VFR BLD AUTO: 44.7 % (ref 36–46)
HGB BLD-MCNC: 14.7 G/DL (ref 12–16)
MCH RBC QN AUTO: 28.8 PG (ref 26–34)
MCHC RBC AUTO-ENTMCNC: 32.9 G/DL (ref 32–36)
MCV RBC AUTO: 88 FL (ref 80–100)
NRBC BLD-RTO: 0 /100 WBCS (ref 0–0)
PHOSPHATE SERPL-MCNC: 3.4 MG/DL (ref 2.5–4.9)
PLATELET # BLD AUTO: 300 X10*3/UL (ref 150–450)
POTASSIUM SERPL-SCNC: 3.9 MMOL/L (ref 3.5–5.3)
RBC # BLD AUTO: 5.11 X10*6/UL (ref 4–5.2)
SODIUM SERPL-SCNC: 142 MMOL/L (ref 136–145)
WBC # BLD AUTO: 7.5 X10*3/UL (ref 4.4–11.3)

## 2024-08-22 PROCEDURE — 80069 RENAL FUNCTION PANEL: CPT

## 2024-08-22 PROCEDURE — 36415 COLL VENOUS BLD VENIPUNCTURE: CPT

## 2024-08-22 PROCEDURE — 85027 COMPLETE CBC AUTOMATED: CPT

## 2024-08-26 ENCOUNTER — TELEPHONE (OUTPATIENT)
Dept: CARDIOLOGY | Facility: HOSPITAL | Age: 64
End: 2024-08-26
Payer: MEDICARE

## 2024-08-26 NOTE — TELEPHONE ENCOUNTER
8/26/24 1641  Called and reiterated to patient to keep follow up with Dr. He and to come to ED for worsening chest pain.    Patient verbalized understanding.        ----- Message from Paul He sent at 8/26/2024  3:20 PM EDT -----  Regarding: RE: Pain after stent  I talked to her. She has some soreness nothing new. She does have some other area of blockage that can't be fixed with stents. Will need medical management. Told her if new or worsening pain them come to hospital. Otherwise she can see me next week.  ----- Message -----  From: Jake Luz RN  Sent: 8/26/2024   2:14 PM EDT  To: Paul He MD  Subject: FW: Pain after stent                             This isn't normal; is it?  ----- Message -----  From: Sterling Anabel  Sent: 8/26/2024   1:23 PM EDT  To: Jake Luz RN  Subject: Pain after stent                                 Pt called saying she is having pain on her L side and wants to make sure this is okay? She just wants to make sure having some pain is okay. She asked for a call back. Thank you!

## 2024-08-27 DIAGNOSIS — I25.10 ASHD (ARTERIOSCLEROTIC HEART DISEASE): ICD-10-CM

## 2024-08-27 DIAGNOSIS — Z95.5 S/P CORONARY ARTERY STENT PLACEMENT: Primary | ICD-10-CM

## 2024-08-29 ENCOUNTER — TELEPHONE (OUTPATIENT)
Dept: CARDIOLOGY | Facility: HOSPITAL | Age: 64
End: 2024-08-29
Payer: MEDICARE

## 2024-09-03 ENCOUNTER — APPOINTMENT (OUTPATIENT)
Dept: CARDIOLOGY | Facility: CLINIC | Age: 64
End: 2024-09-03
Payer: MEDICARE

## 2024-09-03 VITALS
OXYGEN SATURATION: 99 % | WEIGHT: 181 LBS | SYSTOLIC BLOOD PRESSURE: 130 MMHG | HEIGHT: 62 IN | DIASTOLIC BLOOD PRESSURE: 76 MMHG | BODY MASS INDEX: 33.31 KG/M2 | HEART RATE: 72 BPM

## 2024-09-03 DIAGNOSIS — I26.99 PULMONARY EMBOLISM AND INFARCTION (MULTI): ICD-10-CM

## 2024-09-03 DIAGNOSIS — I50.32 CHRONIC HEART FAILURE WITH PRESERVED EJECTION FRACTION (MULTI): ICD-10-CM

## 2024-09-03 DIAGNOSIS — Z95.5 S/P CORONARY ARTERY STENT PLACEMENT: Primary | ICD-10-CM

## 2024-09-03 DIAGNOSIS — E78.2 MIXED HYPERLIPIDEMIA: ICD-10-CM

## 2024-09-03 DIAGNOSIS — I10 PRIMARY HYPERTENSION: ICD-10-CM

## 2024-09-03 PROCEDURE — 3008F BODY MASS INDEX DOCD: CPT | Performed by: STUDENT IN AN ORGANIZED HEALTH CARE EDUCATION/TRAINING PROGRAM

## 2024-09-03 PROCEDURE — 3075F SYST BP GE 130 - 139MM HG: CPT | Performed by: STUDENT IN AN ORGANIZED HEALTH CARE EDUCATION/TRAINING PROGRAM

## 2024-09-03 PROCEDURE — 3044F HG A1C LEVEL LT 7.0%: CPT | Performed by: STUDENT IN AN ORGANIZED HEALTH CARE EDUCATION/TRAINING PROGRAM

## 2024-09-03 PROCEDURE — 3048F LDL-C <100 MG/DL: CPT | Performed by: STUDENT IN AN ORGANIZED HEALTH CARE EDUCATION/TRAINING PROGRAM

## 2024-09-03 PROCEDURE — 3060F POS MICROALBUMINURIA REV: CPT | Performed by: STUDENT IN AN ORGANIZED HEALTH CARE EDUCATION/TRAINING PROGRAM

## 2024-09-03 PROCEDURE — 3078F DIAST BP <80 MM HG: CPT | Performed by: STUDENT IN AN ORGANIZED HEALTH CARE EDUCATION/TRAINING PROGRAM

## 2024-09-03 PROCEDURE — 99215 OFFICE O/P EST HI 40 MIN: CPT | Performed by: STUDENT IN AN ORGANIZED HEALTH CARE EDUCATION/TRAINING PROGRAM

## 2024-09-03 NOTE — PROGRESS NOTES
[facility] Card[facility] Clinic Note    Date: 09/04/24  Time: 11:47 AM    Subjective   Marni Lugo is a 63 y.o. female who is coming to cardiology clinic for follow-up care.  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 The patient reports that she has had chest pain that started months ago.  She reports chest tightness with activity/playing with kids and relieved after resting.  She has cut down her activity due to same.  We had recommended a stress test which was performed but the stress test was submaximal.  She continues to have increasing frequency and duration of chest discomfort and nitroglycerin has not been useful.  Due to this we proceeded with left heart cath/coronary angiography which showed severe RCA lesion.  She underwent PCI of RCA.  She is here for follow-up visit.  She denies any more chest discomfort.  She is compliant with medical therapy.      Review of Systems:  Otherwise, limited cardiovascular review of systems is negative.        Medical History:   She has a past medical history of Abnormal finding of blood chemistry, unspecified (04/14/2014), Acute bronchospasm (04/18/2016), Acute candidiasis of vulva and vagina (07/21/2013), Acute embolism and thrombosis of unspecified vein (02/18/2014), Acute vaginitis (02/11/2016), Adjustment disorder with mixed anxiety and depressed mood (04/06/2023), Age-related nuclear cataract, bilateral (10/18/2022), Anesthesia of skin (03/16/2018), Bitten or stung by nonvenomous insect and other nonvenomous arthropods, initial encounter (09/01/2016), Carpal tunnel syndrome (04/06/2023), Cervical lymphadenitis (04/06/2023), Chronic pain of left ankle (04/06/2023), Diverticulitis of large intestine without perforation or abscess without bleeding (07/21/2013), Dry eye syndrome of unspecified lacrimal gland (10/18/2022), Encounter for follow-up examination after  completed treatment for conditions other than malignant neoplasm (11/18/2015), Encounter for immunization (02/20/2016), Encounter for other preprocedural examination (10/09/2015), Encounter for screening for infections with a predominantly sexual mode of transmission (03/16/2018), Encounter for screening for infections with a predominantly sexual mode of transmission (02/28/2018), Encounter for screening for infections with a predominantly sexual mode of transmission (02/28/2018), Encounter for screening for malignant neoplasm of colon (11/23/2020), Excessive and frequent menstruation with irregular cycle (07/21/2013), Facial cellulitis (05/17/2023), Follicular disorder, unspecified (04/02/2021), Hemorrhage of anus and rectum (05/09/2014), Hot flashes, menopausal (04/06/2023), Hyperlipidemia, unspecified (07/21/2013), Impetigo, unspecified (04/02/2021), Incarcerated umbilical hernia (04/06/2023), Inflamed seborrheic keratosis (04/02/2021), Lateral epicondylitis, unspecified elbow (03/16/2018), Lateral epicondylitis, unspecified elbow (02/28/2018), Lateral epicondylitis, unspecified elbow (02/28/2018), Left sided colitis without complications (Multi) (07/21/2013), Lumbosacral spondylosis (04/06/2023), Myopia, bilateral (10/18/2022), Nevus of left conjunctiva (04/06/2023), Obstructive sleep apnea (adult) (pediatric) (07/21/2013), Other chest pain (05/08/2019), Other conditions influencing health status (12/28/2020), Other conditions influencing health status (05/08/2019), Other enthesopathies, not elsewhere classified (05/06/2014), Other hypersomnia (11/08/2017), 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), Other specified abnormal findings of blood chemistry (10/07/2016), Other symptoms and signs involving the genitourinary system (11/23/2020), Other symptoms and signs involving the nervous system (04/13/2017), Personal history of diseases of the blood and blood-forming organs and certain disorders involving the  immune mechanism (10/18/2022), Personal history of diseases of the skin and subcutaneous tissue, Personal history of other diseases of the circulatory system (07/10/2020), Personal history of other diseases of the female genital tract (10/26/2020), Personal history of other diseases of the musculoskeletal system and connective tissue (01/16/2018), Personal history of other diseases of the respiratory system (07/21/2013), Personal history of other diseases of the respiratory system (04/14/2014), Personal history of other diseases of urinary system (02/02/2022), Personal history of other diseases of urinary system (11/01/2021), Personal history of other infectious and parasitic diseases (04/14/2014), Personal history of other infectious and parasitic diseases, Personal history of other specified conditions (07/21/2020), Personal history of other specified conditions (01/18/2017), Personal history of other specified conditions (11/23/2020), Personal history of other specified conditions (07/14/2022), Personal history of other specified conditions (10/14/2020), Personal history of other specified conditions (06/18/2016), Personal history of other specified conditions (07/26/2016), Personal history of other specified conditions (06/18/2016), Personal history of peptic ulcer disease, Personal history of pulmonary embolism (08/21/2020), Personal history of urinary (tract) infections (11/05/2015), Piriformis syndrome of right side (04/06/2023), Postnasal drip (04/14/2014), Primary osteoarthritis of first carpometacarpal joint of left hand (04/06/2023), Rash and other nonspecific skin eruption (07/14/2022), Right upper quadrant abdominal tenderness (05/22/2017), Spondylolisthesis, acquired (04/06/2023), Type 2 diabetes mellitus (Multi) (11/21/2022), Type 2 diabetes mellitus without complication, without long-term current use of insulin (Multi) (04/06/2023), Ulcerative blepharitis unspecified eye, unspecified eyelid  (09/21/2018), Ulnar neuropathy at elbow of right upper extremity (04/06/2023), Unspecified exophthalmos (07/21/2013), Unspecified exophthalmos (10/18/2022), Urinary tract infection, site not specified (04/14/2014), and Vascular disorder of intestine, unspecified (Multi) (07/21/2013).  Surgical History:   Past Surgical History:   Procedure Laterality Date    CARDIAC CATHETERIZATION N/A 8/19/2024    Procedure: Left Heart Cath, With LV;  Surgeon: Paul He MD;  Location: [facility];  Service: Cardiovascular;  Laterality: N/A;    CARDIAC CATHETERIZATION N/A 8/19/2024    Procedure: PCI ETHAN Stent- Coronary;  Surgeon: Paul He MD;  Location: [facility];  Service: Cardiovascular;  Laterality: N/A;    CARDIAC CATHETERIZATION N/A 8/19/2024    Procedure: IVUS - Coronary;  Surgeon: Paul He MD;  Location: [facility];  Service: Cardiovascular;  Laterality: N/A;    CT ABDOMEN PELVIS ANGIOGRAM W AND/OR WO IV CONTRAST  11/12/2015    CT ABDOMEN PELVIS ANGIOGRAM W AND/OR WO IV CONTRAST 11/12/2015 [facility] CLINICAL LEGAC[facility]    CT ABDOMEN PELVIS ANGIOGRAM W AND/OR WO IV CONTRAST  5/8/2022    CT ABDOMEN PELVIS ANGIOGRAM W AND/OR WO IV CONTRAST 5/8/2022 DOCTOR OFFICE LEGACY    CT ABDOMEN PELVIS ANGIOGRAM W AND/OR WO IV CONTRAST  4/1/2023    CT ABDOMEN PELVIS ANGIOGRAM W AND/OR WO IV CONTRAST U CT    HYSTEROSCOPY  05/09/2014    Hysteroscopy With Endometrial Ablation    OTHER SURGICAL HISTORY  04/07/2014    Left Hemicolectomy    TONSILLECTOMY  04/07/2014    Tonsillectomy    TOTAL VAGINAL HYSTERECTOMY  09/11/2014    Vaginal Hysterectomy    TUBAL LIGATION  04/07/2014    Tubal Ligation   PSHP@  Social History:   Social Determinants of Health with Concerns     Tobacco Use: Medium Risk (9/3/2024)    Patient History     Smoking Tobacco Use: Former     Smokeless Tobacco Use: Never     Passive Exposure: Past   Alcohol Use: Not on file   Financial Resource Strain: Not on file   Food Insecurity: Not on file    Transportation Needs: Not on file   Physical Activity: Not on file   Stress: Not on file   Social Connections: Not on file   Intimate Partner Violence: Not on file   Housing Stability: Not on file   Utilities: Not on file   Digital Equity: Not on file   Health Literacy: Not on file     Family History:   Family History   Problem Relation Name Age of Onset    Heart disease Mother      Kidney failure Father      Cirrhosis Sister      Breast cancer Neg Hx        Allergies:  Adhesive and Latex    Outpatient Medications:  Current Outpatient Medications   Medication Instructions    albuterol 90 mcg/actuation inhaler 2 puffs, inhalation, Every 6 hours PRN    albuterol 2.5 mg, nebulization, Every 6 hours PRN    amLODIPine (NORVASC) 10 mg, oral, Daily    apixaban (ELIQUIS) 5 mg, oral, 2 times daily    aspirin 81 mg, oral, Daily    carbidopa-levodopa (Sinemet)  mg tablet 1.5 tablets, oral, Daily, Take 1 and 1/2 (one and one-half) tablets by mouth daily.    cholecalciferol, vitamin D3, (VITAMIN D3 ORAL) oral    clopidogrel (PLAVIX) 75 mg, oral, Daily    empagliflozin (JARDIANCE) 25 mg, oral, Daily    famotidine (PEPCID) 20 mg, oral, 2 times daily    fluticasone (Flonase) 50 mcg/actuation nasal spray SHAKE LIQUID AND USE 1 TO 2 SPRAYS IN EACH NOSTRIL DAILY AS NEEDED FOR SYMPTOMS OR ALLERGY    furosemide (Lasix) 20 mg tablet TAKE 1 TABLET BY MOUTH EVERY DAY AS NEEDED FOR LEG SWELLING    Gemtesa 75 mg, oral, Daily    metoprolol tartrate (LOPRESSOR) 25 mg, oral, 2 times daily    nitroglycerin (NITROSTAT) 0.4 mg, sublingual, Every 5 min PRN    pantoprazole (PROTONIX) 40 mg, oral, Daily    rosuvastatin (CRESTOR) 40 mg, oral, Daily    sildenafil (Revatio) 20 mg tablet 1 tablet, oral, 2 times daily    topiramate (TOPAMAX) 100 mg, oral, Daily    traMADol (Ultram) 50 mg tablet Every 24 hours    valACYclovir (VALTREX) 500 mg, oral, Daily       Objective     Physical Exam  Vitals:    09/03/24 1448   BP: 130/76   BP Location: Left  "arm   Patient Position: Sitting   BP Cuff Size: Adult   Pulse: 72   SpO2: 99%   Weight: 82.1 kg (181 lb)   Height: 1.562 m (5' 1.5\")     Wt Readings from Last 3 Encounters:   09/03/24 82.1 kg (181 lb)   08/19/24 80.7 kg (178 lb)   07/16/24 83 kg (183 lb)       General: Alert and Oriented, No distress, cooperative  Head: Normocephalic without obvious abnormality, atraumatic  Eyes: Conjunctiva/corneas clear, EOM's grossly intact  Neck: Supple, trachea midline, No thyroid enlargement/tenderness/nodules; No JVD  Lungs: Clear to auscultation bilaterally, no wheezes, rhonci, or rales. respirations unlabored  Chest Wall: No tenderness or deformity  Heart: Regular rhythm, normal S1/S2, no murmur  Abdomen: Soft, non-tender, Non-distended, bowel sounds active  Extremities: No edema, no cyanosis, no clubbing  Skin: Skin color, texture, turgor normal.  No rashes or lesions noted  Neurologic: Alert and oriented x 3, grossly moving all extremities, speech intact        I have personally reviewed the following images and laboratory findings:  ECG: Not done today    PHYSICIAN INTERPRETATION:  Left Ventricle: The left ventricular systolic function is normal, with an estimated ejection fraction of 65%. There are no regional wall motion abnormalities. The left ventricular cavity size is normal. Spectral Doppler shows an impaired relaxation pattern of left ventricular diastolic filling.  Left Atrium: The left atrium is normal in size.  Right Ventricle: The right ventricle is normal in size. There is normal right ventricular global systolic function.  Right Atrium: The right atrium is normal in size.  Aortic Valve: The aortic valve appears structurally normal. There is no evidence of aortic valve regurgitation. The peak instantaneous gradient of the aortic valve is 7.4 mmHg. The mean gradient of the aortic valve is 4.1 mmHg.  Mitral Valve: The mitral valve is normal in structure. There is trace mitral valve regurgitation.  Tricuspid " Valve: The tricuspid valve is structurally normal. No evidence of tricuspid regurgitation. The right ventricular systolic pressure is unable to be estimated.  Pulmonic Valve: The pulmonic valve is structurally normal. There is trace pulmonic valve regurgitation.  Pericardium: There is a trivial pericardial effusion.  Aorta: The aortic root is normal.  Systemic Veins: The inferior vena cava appears to be of normal size. There is IVC inspiratory collapse greater than 50%.  In comparison to the previous echocardiogram(s): Compared with study from 8/10/2017, no significant change.        CONCLUSIONS:   1. Left ventricular systolic function is normal with a 65% estimated ejection fraction.   2. Spectral Doppler shows an impaired relaxation pattern of left ventricular diastolic filling.  Exercise Stress Echo     Patient Name:      YUMIKO ADAMES        Ordering Provider:     [id number] SAMIR GREGORIO  Study Date:        6/19/2024            Reading Physician:     Ace Barnhart MD  MRN/PID:           [id number]             Supervising Physician: Ace Barnhart MD  Accession#:        [id number]         Fellow:  Date of Birth/Age: 1960 / 63 years Fellow:  Gender:            F                    Nurse:                 Therese Pizano  Admission Status:  Outpatient           Sonographer:           Marie Paniagua RDCS  Height:            154.94 cm            Technologist:  Weight:            84.37 kg             Additional Staff:  BSA:               1.83 m2              Encounter#:            [id number]  BMI:               35.14 kg/m2          Patient Location[facility]      [facility]     Study Type:    ECHOCARDIOGRAM STRESS TEST  Diagnosis/ICD: Other forms of angina pectoris-I20.8  Indication:    Chest Pain     Falls Risk:      Study Details: Correct procedure and correct patient verified verbally and with                 ID Band checked.        Patient History: Chronic lung disease, previous deep vein thrombosis, hyperlipidemia, hypertension, palpitations, dyspnea, Cerebral palsy, Sarcoidosis, Raynauds and congestive heart failure.  Allergies: Adhesive, Latex.        Medications: Amlodipine, Apixaban, Sinemet, Gemtesa, Metoprolol, Pantoprazole, Rosuvastatin, Sildenafil,.     Patient Performance: The patient exercised to stage II on a Peterson protocol for 5 minutes and 30 seconds, achieving 7.0 METS. The peak heart rate achieved was 121 bpm, which was 77 % of the age predicted target heart rate of 157 bpm. The resting blood pressure was 115/73 mmHg with a heart rate of 63 bpm. The standing blood pressure was 136/85 mmHg with a heart rate of 62 bpm. The patient's functional capacity was average. The patient developed shortness of breath during the stress exam. The symptoms resolved with rest. The blood pressure response was hypertensive. The test was terminated due to: fatigue.     Peak Oxygen Use: 22.-24 ml/kg/min.  Exercise Capacity: 70% Achieved HR = 4.6 METS : 85% Achieved HR = 7.0 METS.        Baseline ECG: Resting ECG showed normal sinus rhythm with right bundle branch block.     Stress ECG: Stress ECG showed sinus tachycardia, with occ PAC. There was a 1.0 upsloping ST segment depression in leads II, III and aVF during the peak stress period. ST changes resolved in 2 minutes.     Stress Stage Data:  +-----------------+---+------+-------+------+                   HR Sys BPDias BPMETS    +-----------------+---+------+-------+------+  Baseline Resting 63 115   73             +-----------------+---+------+-------+------+  Baseline [id number] 136   85             +-----------------+---+------+-------+------+  Stage I          [id number]   99             +-----------------+---+------+-------+------+  Stage II          [id number]   113    7 METS  +-----------------+---+------+-------+------+        Recovery ECG: Recovery ECG showed normal sinus rhythm. The heart rate recovery was normal.     +-----------+--+------+-------+             HRSys BPDias BP  +-----------+--+------+-------+  Recovery [id number]   95       +-----------+--+------+-------+  Recovery [id number]   80       +-----------+--+------+-------+  Recovery [id number]   77       +-----------+--+------+-------+        Baseline Echo: Definity used as a contrast agent for endocardial border definition. Total contrast used for this procedure was 2 mL via IV push. Normal left ventricular size and function. There are no regional wall motion abnormalities at baseline.     Stress Echo: Normal left ventricular size and function during peak stress. There are no stress induced regional wall motion abnormalities.     Summary:   1. No clinical, echocardiographic or electrocardiographic evidence for ischemia at submaximal workload.   2. Submaximal level of stress achieved.   3. The Tinsley score is 0.    LHC/PCI    Recommendations:  Maximize medical therapy.  Agressive risk factor modification efforts.  Will recommend triple therpay with ASA, Plavix and Eliquis for one month.  Following this patient should continue on Eliquis and Plavix.  Consider referral to cardiac rehabilitation.     ____________________________________________________________________________________  CONCLUSIONS:   1. Culprit vessel(s): right coronary artery.   2. Severe mid RCA stenosis s/p successful IVUS guided PCI with deployment of 3.0 x 26 mm Oynx Parke ETHAN, post dilated with 3.5 NC at high pressure.   3. Severe distal PDA stenosis and branch vessel diagonal 3 stenosis. Given small size vessel they will be medically managed at this time.   4. Non obstructive CAD elsewhere as described above.   5. Left Ventricular end-diastolic pressure = 15 mmHg.    Laboratory values:   Lab on 08/22/2024    Component Date Value    WBC 08/22/2024 7.5     nRBC 08/22/2024 0.0     RBC 08/22/2024 5.11     Hemoglobin 08/22/2024 14.7     Hematocrit 08/22/2024 44.7     MCV 08/22/2024 88     MCH 08/22/2024 28.8     MCHC 08/22/2024 32.9     RDW 08/22/2024 14.6 (H)     Platelets 08/22/2024 300     Glucose 08/22/2024 106 (H)     Sodium 08/22/2024 142     Potassium 08/22/2024 3.9     Chloride 08/22/2024 112 (H)     Bicarbonate 08/22/2024 24     Anion Gap 08/22/2024 10     Urea Nitrogen 08/22/2024 13     Creatinine 08/22/2024 1.08 (H)     eGFR 08/22/2024 58 (L)     Calcium 08/22/2024 8.9     Phosphorus 08/22/2024 3.4     Albumin 08/22/2024 3.9    Admission on 08/19/2024, Discharged on 08/19/2024   Component Date Value    POCT Activated Clotting * 08/19/2024 305 (H)     POCT Activated Clotting * 08/19/2024 345 (H)     POCT Activated Clotting * 08/19/2024 351 (H)     Ventricular Rate 08/19/2024 68     Atrial Rate 08/19/2024 68     NV Interval 08/19/2024 164     QRS Duration 08/19/2024 97     QT Interval 08/19/2024 549     QTC Calculation(Bazett) 08/19/2024 585     P Axis 08/19/2024 56     R Axis 08/19/2024 67     T Axis 08/19/2024 -75     QRS Count 08/19/2024 10     Q Onset 08/19/2024 253     T Offset 08/19/2024 527     QTC [place] 08/19/2024 572     Ventricular Rate 08/19/2024 65     Atrial Rate 08/19/2024 65     NV Interval 08/19/2024 157     QRS Duration 08/19/2024 96     QT Interval 08/19/2024 619     QTC Calculation(Bazett) 08/19/2024 644     P Axis 08/19/2024 48     R Axis 08/19/2024 76     T [place] 08/19/2024 -73     QRS Count 08/19/2024 11     Q Onset 08/19/2024 255     T Offset 08/19/2024 564     QTC Fredericia 08/19/2024 635    Lab on 07/22/2024   Component Date Value    Glucose 07/22/2024 93     Sodium 07/22/2024 143     Potassium 07/22/2024 4.2     Chloride 07/22/2024 112 (H)     Bicarbonate 07/22/2024 25     Anion Gap 07/22/2024 10     Urea Nitrogen 07/22/2024 14     Creatinine 07/22/2024 0.99     eGFR 07/22/2024 65      Calcium 07/22/2024 8.8     WBC 07/22/2024 5.4     nRBC 07/22/2024 0.0     RBC 07/22/2024 5.04     Hemoglobin 07/22/2024 14.7     Hematocrit 07/22/2024 44.9     MCV 07/22/2024 89     MCH 07/22/2024 29.2     MCHC 07/22/2024 32.7     RDW 07/22/2024 14.4     Platelets 07/22/2024 226      CBC -  Lab Results   Component Value Date    WBC 7.5 08/22/2024    HGB 14.7 08/22/2024    HCT 44.7 08/22/2024    MCV 88 08/22/2024     08/22/2024       CMP -  Lab Results   Component Value Date    CALCIUM 8.9 08/22/2024    PHOS 3.4 08/22/2024    PROT 6.9 06/22/2024    ALBUMIN 3.9 08/22/2024    AST 20 06/22/2024    ALT 18 06/22/2024    ALKPHOS 98 06/22/2024    BILITOT 1.0 06/22/2024       LIPID PANEL -   Lab Results   Component Value Date    CHOL 137 05/02/2024    HDL 45.0 05/02/2024    CHHDL 3.0 05/02/2024    VLDL 29 05/02/2024    TRIG 146 05/02/2024    NHDL 92 05/02/2024       RENAL FUNCTION PANEL -   Lab Results   Component Value Date    K 3.9 08/22/2024    PHOS 3.4 08/22/2024       Lab Results   Component Value Date    BNP 63 06/18/2024    HGBA1C 6.5 (H) 05/02/2024        Assessment/Plan   CAD status post PCI  HFpEF/diastolic dysfunction  Recurrent VTE  Hyperlipidemia  Essential hypertension    Plan:  -Patient is post PCI and doing well.  Denies any more anginal.  -Most recent echocardiogram was reviewed.  Mild diastolic dysfunction was noted.  -I will continue on metoprolol tartrate 25 mg twice daily for antianginal effect.  -Continue amlodipine 10 mg daily for antihypertensive effects.  -Triple therpay for one month then Plavix with eliquis.  We will stop aspirin at that time.     RTC as scheduled          In addition, the following orders were placed today:  No orders of the defined types were placed in this encounter.                SIGNATURE: Paul He MD PATIENT NAME: Marni Lugo   DATE/TIME: September 4, 2024 11:47 AM MRN: [id number]

## 2024-09-11 ENCOUNTER — APPOINTMENT (OUTPATIENT)
Dept: NEUROLOGY | Facility: CLINIC | Age: 64
End: 2024-09-11
Payer: MEDICARE

## 2024-09-12 ENCOUNTER — APPOINTMENT (OUTPATIENT)
Dept: CARDIAC REHAB | Facility: HOSPITAL | Age: 64
End: 2024-09-12
Payer: MEDICARE

## 2024-09-16 ENCOUNTER — TELEPHONE (OUTPATIENT)
Dept: PRIMARY CARE | Facility: CLINIC | Age: 64
End: 2024-09-16

## 2024-09-16 ENCOUNTER — APPOINTMENT (OUTPATIENT)
Dept: PRIMARY CARE | Facility: CLINIC | Age: 64
End: 2024-09-16
Payer: MEDICARE

## 2024-09-16 VITALS
BODY MASS INDEX: 33.61 KG/M2 | HEART RATE: 65 BPM | OXYGEN SATURATION: 93 % | DIASTOLIC BLOOD PRESSURE: 66 MMHG | WEIGHT: 180.8 LBS | TEMPERATURE: 97.3 F | SYSTOLIC BLOOD PRESSURE: 105 MMHG

## 2024-09-16 DIAGNOSIS — N18.32 TYPE 2 DIABETES MELLITUS WITH STAGE 3B CHRONIC KIDNEY DISEASE, WITHOUT LONG-TERM CURRENT USE OF INSULIN (MULTI): ICD-10-CM

## 2024-09-16 DIAGNOSIS — J45.909 MILD ASTHMA, UNSPECIFIED WHETHER COMPLICATED, UNSPECIFIED WHETHER PERSISTENT (HHS-HCC): ICD-10-CM

## 2024-09-16 DIAGNOSIS — N18.32 STAGE 3B CHRONIC KIDNEY DISEASE (MULTI): ICD-10-CM

## 2024-09-16 DIAGNOSIS — E55.9 VITAMIN D DEFICIENCY: ICD-10-CM

## 2024-09-16 DIAGNOSIS — E11.22 TYPE 2 DIABETES MELLITUS WITH STAGE 3B CHRONIC KIDNEY DISEASE, WITHOUT LONG-TERM CURRENT USE OF INSULIN (MULTI): ICD-10-CM

## 2024-09-16 DIAGNOSIS — J01.20 SUBACUTE ETHMOIDAL SINUSITIS: Primary | ICD-10-CM

## 2024-09-16 PROCEDURE — 3048F LDL-C <100 MG/DL: CPT | Performed by: FAMILY MEDICINE

## 2024-09-16 PROCEDURE — 3078F DIAST BP <80 MM HG: CPT | Performed by: FAMILY MEDICINE

## 2024-09-16 PROCEDURE — 1036F TOBACCO NON-USER: CPT | Performed by: FAMILY MEDICINE

## 2024-09-16 PROCEDURE — 3044F HG A1C LEVEL LT 7.0%: CPT | Performed by: FAMILY MEDICINE

## 2024-09-16 PROCEDURE — 3074F SYST BP LT 130 MM HG: CPT | Performed by: FAMILY MEDICINE

## 2024-09-16 PROCEDURE — 99214 OFFICE O/P EST MOD 30 MIN: CPT | Performed by: FAMILY MEDICINE

## 2024-09-16 PROCEDURE — 3060F POS MICROALBUMINURIA REV: CPT | Performed by: FAMILY MEDICINE

## 2024-09-16 RX ORDER — AZITHROMYCIN 250 MG/1
TABLET, FILM COATED ORAL
Qty: 6 TABLET | Refills: 0 | Status: SHIPPED | OUTPATIENT
Start: 2024-09-16 | End: 2024-09-21

## 2024-09-16 RX ORDER — BENZONATATE 200 MG/1
200 CAPSULE ORAL 3 TIMES DAILY PRN
Qty: 42 CAPSULE | Refills: 0 | Status: SHIPPED | OUTPATIENT
Start: 2024-09-16 | End: 2024-10-16

## 2024-09-16 RX ORDER — ALBUTEROL SULFATE 90 UG/1
2 INHALANT RESPIRATORY (INHALATION) EVERY 6 HOURS PRN
Qty: 18 G | Refills: 2 | Status: SHIPPED | OUTPATIENT
Start: 2024-09-16

## 2024-09-16 ASSESSMENT — ENCOUNTER SYMPTOMS
COUGH: 1
SINUS PAIN: 1
SORE THROAT: 1
WHEEZING: 0
SWOLLEN GLANDS: 0
HEADACHES: 1
VOMITING: 1
RHINORRHEA: 1
NAUSEA: 1

## 2024-09-16 NOTE — TELEPHONE ENCOUNTER
Patient called regarding the diagnosis verification letter that was faxed over. She said they are requesting the corresponding ICD 10 code for her diagnosis to be written on the form and faxed back to them.

## 2024-09-16 NOTE — PATIENT INSTRUCTIONS
Use inhaler 2 puffs every 4 hours for cough and wheezing.     Started Zithromax- this is an antibiotic. Take 2 today then 1 daily for 4 days.

## 2024-09-16 NOTE — PROGRESS NOTES
Subjective   Patient ID: Marni Lugo is a 63 y.o. female who presents for Follow-up (Before 8/17/2024 she has had nasal congestion, dry cough,fever,  fatigue, sneezing that has been getting worse over the last week. Loot at right ear. ).    Got sick before stent put in 8/17. Had dry hacky cough. Has been there since. Developed sneezing, coughing. Purulent nasal discharge. 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 Still with icky cough but cannot bring anything up. She cannot sleep due to cough. Chest cough. Ribs hurt from coughing. Right ear and right side of throat hurt when swallow.     URI   This is a new problem. The current episode started 1 to 4 weeks ago. The problem has been gradually worsening. Maximum temperature: Had fever couple weeks ago. Associated symptoms include congestion, coughing, ear pain, headaches, nausea, rhinorrhea, sinus pain, a sore throat and vomiting. Pertinent negatives include no chest pain, joint swelling, swollen glands or wheezing. Associated symptoms comments: Pressure in ethmoid. Feels like congestion in chest. . She has tried acetaminophen for the symptoms. The treatment provided no relief.          Current Outpatient Medications:     albuterol 2.5 mg /3 mL (0.083 %) nebulizer solution, Take 3 mL (2.5 mg) by nebulization every 6 hours if needed., Disp: , Rfl:     amLODIPine (Norvasc) 10 mg tablet, Take 1 tablet (10 mg) by mouth once daily., Disp: 90 tablet, Rfl: 3    apixaban (Eliquis) 5 mg tablet, Take 1 tablet (5 mg) by mouth 2 times a day., Disp: 60 tablet, Rfl: 11    aspirin 81 mg chewable tablet, Chew 1 tablet (81 mg) once daily., Disp: 30 tablet, Rfl: 11    carbidopa-levodopa (Sinemet)  mg tablet, TAKE 1 AND 1/2 TABLETS BY MOUTH DAILY, Disp: 135 tablet, Rfl: 2    cholecalciferol, vitamin D3, (VITAMIN D3 ORAL), Take by mouth., Disp: , Rfl:     clopidogrel (Plavix) 75 mg tablet, Take 1 tablet (75 mg) by mouth once daily., Disp: 90 tablet, Rfl: 3    empagliflozin (Jardiance) 25  mg, Take 1 tablet (25 mg) by mouth once daily., Disp: 90 tablet, Rfl: 3    famotidine (Pepcid) 20 mg tablet, Take 1 tablet (20 mg) by mouth 2 times a day., Disp: 180 tablet, Rfl: 3    fluticasone (Flonase) 50 mcg/actuation nasal spray, SHAKE LIQUID AND USE 1 TO 2 SPRAYS IN EACH NOSTRIL DAILY AS NEEDED FOR SYMPTOMS OR ALLERGY, Disp: , Rfl:     furosemide (Lasix) 20 mg tablet, TAKE 1 TABLET BY MOUTH EVERY DAY AS NEEDED FOR LEG SWELLING, Disp: 90 tablet, Rfl: 3    Gemtesa 75 mg tablet, Take 1 tablet (75 mg) by mouth once daily., Disp: 30 tablet, Rfl: 11    metoprolol tartrate (Lopressor) 25 mg tablet, Take 1 tablet (25 mg) by mouth 2 times a day., Disp: 60 tablet, Rfl: 11    nitroglycerin (Nitrostat) 0.4 mg SL tablet, Place 1 tablet (0.4 mg) under the tongue every 5 minutes if needed for chest pain., Disp: 25 tablet, Rfl: 2    pantoprazole (ProtoNix) 40 mg EC tablet, Take 1 tablet (40 mg) by mouth once daily., Disp: , Rfl:     rosuvastatin (Crestor) 40 mg tablet, Take 1 tablet (40 mg) by mouth once daily., Disp: 90 tablet, Rfl: 1    sildenafil (Revatio) 20 mg tablet, Take 1 tablet (20 mg) by mouth 2 times a day., Disp: , Rfl:     topiramate (Topamax) 25 mg tablet, Take 4 tablets (100 mg) by mouth once daily., Disp: 360 tablet, Rfl: 3    traMADol (Ultram) 50 mg tablet, once every 24 hours., Disp: , Rfl:     valACYclovir (Valtrex) 500 mg tablet, Take 1 tablet (500 mg) by mouth once daily., Disp: 90 tablet, Rfl: 3    albuterol 90 mcg/actuation inhaler, Inhale 2 puffs every 6 hours if needed for shortness of breath or wheezing., Disp: 18 g, Rfl: 2    azithromycin (Zithromax) 250 mg tablet, Take 2 tablets (500 mg) by mouth once daily for 1 day, THEN 1 tablet (250 mg) once daily for 4 days. Take 2 tabs (500 mg) by mouth today, than 1 daily for 4 days.., Disp: 6 tablet, Rfl: 0    benzonatate (Tessalon) 200 mg capsule, Take 1 capsule (200 mg) by mouth 3 times a day as needed for cough. Do not crush or chew., Disp: 42 capsule,  Rfl: 0    Patient Active Problem List   Diagnosis    Mild cognitive disorder    Antiphospholipid antibody syndrome (Multi)    Asthma ([facility])    Obstructive sleep apnea syndrome    Synovial cyst of left knee    Bilateral knee pain    Carpal tunnel syndrome    Cervical lymphadenitis    Congestive heart failure (Multi)    Stage 3b chronic kidney disease (Multi)    Chronic midline low back pain without sciatica    Mass of urinary bladder    Diffusion capacity of lung (dl), decreased    Dry skin dermatitis    Erythema nodosum    Fuchs' corneal dystrophy    History of DVT (deep vein thrombosis)    Hyperlipidemia    Hot flashes due to menopause    Hypertension    Inflammatory arthritis    Lateral epicondylitis of right elbow    Lumbar radiculopathy    Microscopic hematuria    Migraine without aura    Nevus of conjunctiva    Obesity    Osteoarthritis of multiple joints    Overactive bladder    Cyst of pancreas ([facility]HS-HCC)    Piriformis syndrome    Postphlebitic syndrome    Type 2 diabetes mellitus (Multi)    Raynaud's phenomenon    Restless legs syndrome    Sarcoidosis    Allergic rhinitis    Seborrhea capitis    Small fiber neuropathy    Acquired spondylolisthesis    Tubular adenoma of colon    Ulnar neuropathy    Vitamin D deficiency    Xerostomia    Peripheral venous insufficiency    Other chest pain    History of pulmonary embolus (PE)    Folliculitis decalvans    Contracture, left ankle    Disability of walking    Left sided ulcerative colitis (Multi)    Chronic anticoagulation    Recurrent cold sores    Pain of right lower extremity    Abdominal hernia    Abnormal computerized axial tomography of chest    Abnormal uterine bleeding    Abscess of buttock    Acute bronchitis    Acute gastroenteritis    Adenomatous polyp of colon    Adjustment disorder with mixed emotional features    Bronchospasm    Candidiasis of vagina    Trichomonal vulvovaginitis    Cough    Snoring    Daytime somnolence    Diarrhea    Disorder of  tendon of shoulder region    Diverticulitis of colon    Dry eyes    Dyspnea on exertion    Excessive sweating    Exophthalmos    Genital herpes simplex    Headache    History of viral infection    Hypersomnia    Insomnia    Injury of right ankle    Ischemic colitis (Multi)    Itching    Corneal edema    Lymphedema    Metrorrhagia    Myopia    Nasal discharge    Nausea    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    Nuclear sclerosis    Palpitations    Prediabetes    Protein S deficiency (Multi)    Raynaud's disease    Pain of hand    Numbness of hand    Pain in wrist    Shoulder pain    Staphylococcus infection    Strain of neck muscle    Superficial postoperative wound infection    Swelling of lower extremity    Tear film insufficiency    Tear of rotator cuff    Urge incontinence of urine    Vaginal discharge    Weakness of left hand    Chronic ankle pain    Inflammatory dermatosis    Injury of foot    Plantar fasciitis    Radial styloid tenosynovitis    Cervical radiculopathy    Cerebral palsy (Multi)    Arthralgia of hip    Pain in buttock    Knee pain    Greater trochanteric bursitis    Sinusitis    Angina pectoris (CMS-HCC)         Review of Systems   HENT:  Positive for congestion, ear pain, rhinorrhea, sinus pain and sore throat.    Respiratory:  Positive for cough. Negative for wheezing.    Cardiovascular:  Negative for chest pain.   Gastrointestinal:  Positive for nausea and vomiting.   Neurological:  Positive for headaches.       Objective   /66 (BP Location: Left arm, Patient Position: Sitting)   Pulse 65   Temp 36.3 °C (97.3 °F)   Wt 82 kg (180 lb 12.8 oz)   SpO2 93%   BMI 33.61 kg/m²     Physical Exam  Vitals reviewed.   Constitutional:       General: She is not in acute distress.  HENT:      Right Ear: Tympanic membrane normal.      Left Ear: Tympanic membrane normal.      Nose: Congestion and rhinorrhea present.      Mouth/Throat:      Mouth: Mucous membranes are moist.   Eyes:       Conjunctiva/sclera: Conjunctivae normal.   Cardiovascular:      Rate and Rhythm: Normal rate and regular rhythm.      Heart sounds: Normal heart sounds.   Pulmonary:      Effort: Pulmonary effort is normal. No respiratory distress.      Breath sounds: No wheezing, rhonchi or rales.      Comments: Decreased breath sounds throughout lung fields. No speech dyspnea.   Musculoskeletal:      Cervical back: Neck supple.   Skin:     General: Skin is warm.      Findings: No rash.   Neurological:      Mental Status: She is alert.   Psychiatric:         Mood and Affect: Mood normal.         Assessment/Plan   Problem List Items Addressed This Visit       Asthma ([facility])     Increase albuterol to every 4 hours while sick. Mucinex. If not doing better in couple days, add steroid         Relevant Medications    albuterol 90 mcg/actuation inhaler    Stage 3b chronic kidney disease (Multi)     Referred to pharmacy to follow.          Relevant Orders    Referral to Clinical Pharmacy    Type 2 diabetes mellitus (Multi)    Relevant Orders    Renal Function Panel    CBC    Hemoglobin A1C    Lipid Panel    Referral to Clinical Pharmacy    Vitamin D deficiency    Relevant Orders    Vitamin D 25-Hydroxy,Total (for eval of Vitamin D levels)    Sinusitis - Primary     Has triggered asthma exaceration. Antibiotics ordered, Mucinex, Albureol and Benzonatate.          Relevant Medications    azithromycin (Zithromax) 250 mg tablet    benzonatate (Tessalon) 200 mg capsule         Assessment, plans, tests, and follow up discussed with patient and patient verbalized understanding. Marni was given an opportunity to ask questions and  any concerns were addressed including but not limited to medications, referral, indications for follow up.

## 2024-09-17 NOTE — ASSESSMENT & PLAN NOTE
Increase albuterol to every 4 hours while sick. Mucinex. If not doing better in couple days, add steroid

## 2024-09-18 NOTE — TELEPHONE ENCOUNTER
I cannot release any information about diagnosis dirctly to the employer. They are not really entitled to it and has to have a release. I can send it to her in [email or web address] and she can give it to them or I can print it for her to  and deliver to them. Otherwise, she has to sign a release and it needs to be specific about what can be said.

## 2024-09-18 NOTE — TELEPHONE ENCOUNTER
Spoke to Melissa from [facility] [phone].  Marni is trying to get Medicaid secondary benefits through [facility]. They had faxed over a diagnosis verification letter  and we faxed it back with a copy of her problem list.   [facility] had call to discuss one of her diagnosis.  Which is what Marni was trying to tell the office with this message.  It has nothing to do with work.     Dalia stated that the diagnosis of Adjustment disorder with mixed emotional features- F43.29 is not covered.  They wanted to know if it could be changed to another F43......  They cover all the other Adjustment Disorders just not the one ending in .29.     Please let Dalia from [facility] know by Friday.  If it can't be changed that the patient will be denied benefits.

## 2024-09-19 PROBLEM — F43.20 ADJUSTMENT DISORDER: Status: ACTIVE | Noted: 2024-05-21

## 2024-09-19 NOTE — TELEPHONE ENCOUNTER
Marni left me a voicemail message.  I called Marni back.  She wanted to know if we faxed the requested information to [facility].  I let her know that it was faxed earlier and I also left a message for Melissa at [facility].

## 2024-09-29 ENCOUNTER — TELEPHONE (OUTPATIENT)
Dept: CARDIOLOGY | Facility: HOSPITAL | Age: 64
End: 2024-09-29
Payer: MEDICARE

## 2024-09-29 NOTE — TELEPHONE ENCOUNTER
Patient called the cardiology answering service reporting a lump under her breast.  She states she noted it several weeks ago and it has been persistent.  No cardiac symptoms reported.  I recommended she have it further evaluated by her PCP.

## 2024-09-30 ENCOUNTER — TELEPHONE (OUTPATIENT)
Dept: PRIMARY CARE | Facility: CLINIC | Age: 64
End: 2024-09-30
Payer: MEDICARE

## 2024-09-30 NOTE — TELEPHONE ENCOUNTER
Patient reported finding a lump on her left breast on Friday. She said that is is painful and tender when she would touch it. Patient is scheduled to see you on 10/21, but wanted to know if anything further could be done between now in then; or if she could be seen sooner. Please advise.

## 2024-10-01 NOTE — TELEPHONE ENCOUNTER
Patient called in and I did offer her a sooner appointment but she was not able to take that appointment because she did not have any transportation.

## 2024-10-08 ENCOUNTER — APPOINTMENT (OUTPATIENT)
Dept: CARDIOLOGY | Facility: CLINIC | Age: 64
End: 2024-10-08
Payer: MEDICARE

## 2024-10-17 ENCOUNTER — APPOINTMENT (OUTPATIENT)
Dept: PRIMARY CARE | Facility: CLINIC | Age: 64
End: 2024-10-17
Payer: MEDICARE

## 2024-10-17 ENCOUNTER — APPOINTMENT (OUTPATIENT)
Dept: PHARMACY | Facility: HOSPITAL | Age: 64
End: 2024-10-17
Payer: MEDICARE

## 2024-10-17 DIAGNOSIS — N18.32 TYPE 2 DIABETES MELLITUS WITH STAGE 3B CHRONIC KIDNEY DISEASE, WITHOUT LONG-TERM CURRENT USE OF INSULIN (MULTI): ICD-10-CM

## 2024-10-17 DIAGNOSIS — N18.32 STAGE 3B CHRONIC KIDNEY DISEASE (MULTI): ICD-10-CM

## 2024-10-17 DIAGNOSIS — E11.22 TYPE 2 DIABETES MELLITUS WITH STAGE 3B CHRONIC KIDNEY DISEASE, WITHOUT LONG-TERM CURRENT USE OF INSULIN (MULTI): ICD-10-CM

## 2024-10-17 NOTE — PROGRESS NOTES
Clinical Pharmacy Appointment    Patient ID: Marni Lugo is a 63 y.o. female who presents for Med Management.    Pt is here for First appointment.     Referring Provider: Gunjan Salvador, *  PCP: Gunjan Salvador MD   Last visit with PCP: 9/16/2024   Next visit with PCP: ---      Subjective     Interval History  N/A    HPI  Type II Diabetes  Current  Pharmacotherapy:   Jardiance 25 mg daily     SECONDARY PREVENTION  - Statin? Rosuvastatin 40 mg   LDL: 63   TC: 137  - ACE-I/ARB? No   UACR: 144.2 (6/2024)   BP: 105/66  - Aspirin? Yes    -The 10-year ASCVD risk score (Ten OSULLIVAN, et al., 2019) is: 8.2%    Values used to calculate the score:      Age: 63 years      Sex: Female      Is Non- : Yes      Diabetic: Yes      Tobacco smoker: No      Systolic Blood Pressure: 105 mmHg      Is BP treated: Yes      HDL Cholesterol: 45 mg/dL      Total Cholesterol: 137 mg/dL         CKD  eGFR: 58  UACR: 144 (6/2024)    ACE/ARB: No  SGLT2: Jardiance 25 mg         Drug Interactions  No relevant drug interactions were noted.    Medication System Management  [facility]  Adherence/Organization:   Has missed a couple doses of her meds  Especially plavix.  Affordability/Accessibility: n/a      Objective   Allergies   Allergen Reactions    Adhesive Unknown    Latex Hives, Itching, Rash, Swelling and Unknown     Social History     Social History Narrative    Not on file      Medication Review  Current Outpatient Medications   Medication Instructions    albuterol 90 mcg/actuation inhaler 2 puffs, inhalation, Every 6 hours PRN    albuterol 2.5 mg, nebulization, Every 6 hours PRN    amLODIPine (NORVASC) 10 mg, oral, Daily    apixaban (ELIQUIS) 5 mg, oral, 2 times daily    carbidopa-levodopa (Sinemet)  mg tablet 1.5 tablets, oral, Daily, Take 1 and 1/2 (one and one-half) tablets by mouth daily.    cholecalciferol, vitamin D3, (VITAMIN D3 ORAL) oral     clopidogrel (PLAVIX) 75 mg, oral, Daily    empagliflozin (JARDIANCE) 25 mg, oral, Daily    famotidine (PEPCID) 20 mg, oral, 2 times daily    fluticasone (Flonase) 50 mcg/actuation nasal spray SHAKE LIQUID AND USE 1 TO 2 SPRAYS IN EACH NOSTRIL DAILY AS NEEDED FOR SYMPTOMS OR ALLERGY    furosemide (Lasix) 20 mg tablet TAKE 1 TABLET BY MOUTH EVERY DAY AS NEEDED FOR LEG SWELLING    Gemtesa 75 mg, oral, Daily    metoprolol tartrate (LOPRESSOR) 25 mg, oral, 2 times daily    nitroglycerin (NITROSTAT) 0.4 mg, sublingual, Every 5 min PRN    pantoprazole (PROTONIX) 40 mg, oral, Daily    rosuvastatin (CRESTOR) 40 mg, oral, Daily    sildenafil (Revatio) 20 mg tablet 1 tablet, oral, 2 times daily    topiramate (TOPAMAX) 100 mg, oral, Daily    traMADol (Ultram) 50 mg tablet Every 24 hours    valACYclovir (VALTREX) 500 mg, oral, Daily      Vitals  BP Readings from Last 2 Encounters:   09/16/24 105/66   09/03/24 130/76     BMI Readings from Last 1 Encounters:   09/16/24 33.61 kg/m²      Labs  A1C  Lab Results   Component Value Date    HGBA1C 6.5 (H) 05/02/2024    HGBA1C 7.0 (H) 01/31/2024    HGBA1C 6.6 (A) 07/20/2023     BMP  Lab Results   Component Value Date    CALCIUM 8.9 08/22/2024     08/22/2024    K 3.9 08/22/2024    CO2 24 08/22/2024     (H) 08/22/2024    BUN 13 08/22/2024    CREATININE 1.08 (H) 08/22/2024    EGFR 58 (L) 08/22/2024     LFTs  Lab Results   Component Value Date    ALT 18 06/22/2024    AST 20 06/22/2024    ALKPHOS 98 06/22/2024    BILITOT 1.0 06/22/2024     FLP  Lab Results   Component Value Date    TRIG 146 05/02/2024    CHOL 137 05/02/2024    LDLF 156 (H) 07/20/2023    LDLCALC 63 05/02/2024    HDL 45.0 05/02/2024     Urine Microalbumin  Lab Results   Component Value Date    MICROALBCREA 144.2 (H) 06/24/2024     Weight Management  Wt Readings from Last 3 Encounters:   09/16/24 82 kg (180 lb 12.8 oz)   09/03/24 82.1 kg (181 lb)   08/19/24 80.7 kg (178 lb)      There is no height or weight on file to  calculate BMI.     Assessment/Plan   Problem List Items Addressed This Visit       Stage 3b chronic kidney disease (Multi)     Patient is currently Stage 3bA2.     She is not currently on GDMT based on recent labs. I recommended initiation of losartan 25 mg to assist to proteinuria. Patient states that she sees a nephrologist outside of  and declines making any changes with me today.     Plan:  CONTINUE Jardiance 25 mg daily         Type 2 diabetes mellitus     Patient's diabetes is currently well-controlled.     She is using diet, exercise and Jardiance for control. She should continue to monitor blood sugars     Plan:  CONTINUE Jardiance 25 mg daily            Clinical Pharmacist follow-up: as requested, Telehealth visit    Continue all meds under the continuation of care with the referring provider and clinical pharmacy team.    Thank you,  Jennifer Dunbar, PharmD  Clinical Pharmacy Specialist     Verbal consent to manage patient's drug therapy was obtained from the patient. They were informed they may decline to participate or withdraw from participation in pharmacy services at any time.

## 2024-10-17 NOTE — ASSESSMENT & PLAN NOTE
Patient is currently Stage 3bA2.     She is not currently on GDMT based on recent labs. I recommended initiation of losartan 25 mg to assist to proteinuria. Patient states that she sees a nephrologist outside of  and declines making any changes with me today.     Plan:  CONTINUE Jardiance 25 mg daily

## 2024-10-17 NOTE — ASSESSMENT & PLAN NOTE
Patient's diabetes is currently well-controlled.     She is using diet, exercise and Jardiance for control. She should continue to monitor blood sugars     Plan:  CONTINUE Jardiance 25 mg daily

## 2024-10-20 ENCOUNTER — OFFICE VISIT (OUTPATIENT)
Dept: URGENT CARE | Age: 64
End: 2024-10-20
Payer: MEDICARE

## 2024-10-20 VITALS
SYSTOLIC BLOOD PRESSURE: 114 MMHG | DIASTOLIC BLOOD PRESSURE: 76 MMHG | OXYGEN SATURATION: 98 % | HEART RATE: 59 BPM | RESPIRATION RATE: 16 BRPM | TEMPERATURE: 98.3 F

## 2024-10-20 DIAGNOSIS — N61.1 BREAST ABSCESS: Primary | ICD-10-CM

## 2024-10-20 PROCEDURE — 3078F DIAST BP <80 MM HG: CPT | Performed by: NURSE PRACTITIONER

## 2024-10-20 PROCEDURE — 1036F TOBACCO NON-USER: CPT | Performed by: NURSE PRACTITIONER

## 2024-10-20 PROCEDURE — 3074F SYST BP LT 130 MM HG: CPT | Performed by: NURSE PRACTITIONER

## 2024-10-20 PROCEDURE — 3060F POS MICROALBUMINURIA REV: CPT | Performed by: NURSE PRACTITIONER

## 2024-10-20 PROCEDURE — 3044F HG A1C LEVEL LT 7.0%: CPT | Performed by: NURSE PRACTITIONER

## 2024-10-20 PROCEDURE — 3048F LDL-C <100 MG/DL: CPT | Performed by: NURSE PRACTITIONER

## 2024-10-20 PROCEDURE — 99213 OFFICE O/P EST LOW 20 MIN: CPT | Performed by: NURSE PRACTITIONER

## 2024-10-20 RX ORDER — CEPHALEXIN 500 MG/1
500 CAPSULE ORAL 3 TIMES DAILY
Qty: 30 CAPSULE | Refills: 0 | Status: SHIPPED | OUTPATIENT
Start: 2024-10-20 | End: 2024-10-30

## 2024-10-20 ASSESSMENT — ENCOUNTER SYMPTOMS
CONSTITUTIONAL NEGATIVE: 1
CARDIOVASCULAR NEGATIVE: 1
MUSCULOSKELETAL NEGATIVE: 1
RESPIRATORY NEGATIVE: 1
WOUND: 1

## 2024-10-20 ASSESSMENT — PAIN SCALES - GENERAL: PAINLEVEL_OUTOF10: 8

## 2024-10-20 NOTE — PROGRESS NOTES
Subjective   Patient ID: Marni Lugo is a 63 y.o. female. They present today with a chief complaint of lump mid sternal.    History of Present Illness  62 yo female presents with c/o painful lump left breast.  Recent normal mammogram.  No drainage, fever, chills or other symptoms          Past Medical History  Allergies as of 10/20/2024 - Reviewed 10/20/2024   Allergen Reaction Noted    Adhesive Unknown 04/06/2023    Latex Hives, Itching, Rash, Swelling, and Unknown 03/25/2015       (Not in a hospital admission)       Past Medical History:   Diagnosis Date    Abnormal finding of blood chemistry, unspecified 04/14/2014    Abnormal blood chemistry    Acute bronchospasm 04/18/2016    Bronchospasm    Acute candidiasis of vulva and vagina 07/21/2013    Vaginitis due to Candida albicans    Acute embolism and thrombosis of unspecified vein 02/18/2014    Venous thrombosis    Acute vaginitis 02/11/2016    Bacterial vaginosis    Adjustment disorder with mixed anxiety and depressed mood 04/06/2023    Age-related nuclear cataract, bilateral 10/18/2022    Nuclear sclerosis of both eyes    Anesthesia of skin 03/16/2018    Numbness and tingling in both hands    Bitten or stung by nonvenomous insect and other nonvenomous arthropods, initial encounter 09/01/2016    Bug bite, initial encounter    Carpal tunnel syndrome 04/06/2023    Cervical lymphadenitis 04/06/2023    Chronic pain of left ankle 04/06/2023    Diverticulitis of large intestine without perforation or abscess without bleeding 07/21/2013    Diverticulitis of colon    Dry eye syndrome of unspecified lacrimal gland 10/18/2022    Dry eye syndrome    Encounter for follow-up examination after completed treatment for conditions other than malignant neoplasm 11/18/2015    Hospital discharge follow-up    Encounter for immunization 02/20/2016    Immunization due    Encounter for other preprocedural examination 10/09/2015    Pre-operative clearance    Encounter for screening for  infections with a predominantly sexual mode of transmission 03/16/2018    Screening for STD (sexually transmitted disease)    Encounter for screening for infections with a predominantly sexual mode of transmission 02/28/2018    Screening for STD (sexually transmitted disease)    Encounter for screening for infections with a predominantly sexual mode of transmission 02/28/2018    Screening for STD (sexually transmitted disease)    Encounter for screening for malignant neoplasm of colon 11/23/2020    Encounter for screening colonoscopy    Excessive and frequent menstruation with irregular cycle 07/21/2013    Metrorrhagia    Facial cellulitis 05/17/2023    Follicular disorder, unspecified 04/02/2021    Hemorrhage of anus and rectum 05/09/2014    Rectal hemorrhage    Hot flashes, menopausal 04/06/2023    Hyperlipidemia, unspecified 07/21/2013    Hyperlipidemia    Impetigo, unspecified 04/02/2021    Incarcerated umbilical hernia 04/06/2023    Surgery 4/1/23. Patient doing well. Dr. Tawanda Munoz.     Inflamed seborrheic keratosis 04/02/2021    Lateral epicondylitis, unspecified elbow 03/16/2018    Tennis elbow    Lateral epicondylitis, unspecified elbow 02/28/2018    Tennis elbow    Lateral epicondylitis, unspecified elbow 02/28/2018    Tennis elbow    Left sided colitis without complications (Multi) 07/21/2013    Ulcerative colitis, left sided    Lumbosacral spondylosis 04/06/2023    Myopia, bilateral 10/18/2022    Myopia of both eyes with astigmatism and presbyopia    Nevus of left conjunctiva 04/06/2023    Obstructive sleep apnea (adult) (pediatric) 07/21/2013    Obstructive sleep apnea    Other chest pain 05/08/2019    Chest tightness    Other conditions influencing health status 12/28/2020    Colonoscopy planned    Other conditions influencing health status 05/08/2019    History of cough    Other enthesopathies, not elsewhere classified 05/06/2014    Tendinitis of right shoulder    Other hypersomnia 11/08/2017     Excessive daytime sleepiness    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    Protein S deficiency    Other specified abnormal findings of blood chemistry 10/07/2016    Elevated serum creatinine    Other symptoms and signs involving the genitourinary system 11/23/2020    Abnormal urogenital discharge    Other symptoms and signs involving the nervous system 04/13/2017    Suspected sleep apnea    Personal history of diseases of the blood and blood-forming organs and certain disorders involving the immune mechanism 10/18/2022    History of sarcoidosis    Personal history of diseases of the skin and subcutaneous tissue     History of dermatitis    Personal history of other diseases of the circulatory system 07/10/2020    History of congestive heart failure    Personal history of other diseases of the female genital tract 10/26/2020    History of abnormal uterine bleeding    Personal history of other diseases of the musculoskeletal system and connective tissue 01/16/2018    History of lateral epicondylitis    Personal history of other diseases of the respiratory system 07/21/2013    History of acute bronchitis    Personal history of other diseases of the respiratory system 04/14/2014    Personal history of sinusitis    Personal history of other diseases of urinary system 02/02/2022    History of hematuria    Personal history of other diseases of urinary system 11/01/2021    History of hematuria    Personal history of other infectious and parasitic diseases 04/14/2014    History of trichomoniasis    Personal history of other infectious and parasitic diseases     History of herpes simplex infection    Personal history of other specified conditions 07/21/2020    History of chest pain    Personal history of other specified conditions 01/18/2017    History of dysuria    Personal history of other specified conditions 11/23/2020    History of diarrhea    Personal history of other specified conditions 07/14/2022    History  of itching    Personal history of other specified conditions 10/14/2020    History of dyspnea    Personal history of other specified conditions 06/18/2016    History of excessive sweating    Personal history of other specified conditions 07/26/2016    History of diarrhea    Personal history of other specified conditions 06/18/2016    History of nausea    Personal history of peptic ulcer disease     History of peptic ulcer    Personal history of pulmonary embolism 08/21/2020    History of pulmonary embolism    Personal history of urinary (tract) infections 11/05/2015    History of urinary tract infection    Piriformis syndrome of right side 04/06/2023    Postnasal drip 04/14/2014    Post-nasal drainage    Primary osteoarthritis of first carpometacarpal joint of left hand 04/06/2023    Rash and other nonspecific skin eruption 07/14/2022    Rash    Right upper quadrant abdominal tenderness 05/22/2017    Abdominal tenderness, right upper quadrant    Spondylolisthesis, acquired 04/06/2023    Type 2 diabetes mellitus (Multi) 11/21/2022    Formatting of this note might be different from the original. 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 Cardiac  Cath Lab;  Service: Cardiovascular;  Laterality: N/A;    CARDIAC CATHETERIZATION N/A 8/19/2024    Procedure: PCI ETHAN Stent- Coronary;  Surgeon: Paul He MD;  Location: [facility] Cardiac [facility] Lab;  Service: Cardiovascular;  Laterality: N/A;    CARDIAC CATHETERIZATION N/A 8/19/2024    Procedure: IVUS - Coronary;  Surgeon: Paul He MD;  Location: [facility] Cardiac Cath Lab;  Service: Cardiovascular;  Laterality: N/A;    CT ABDOMEN PELVIS ANGIOGRAM W AND/OR WO IV CONTRAST  11/12/2015    CT ABDOMEN PELVIS ANGIOGRAM W AND/OR WO IV CONTRAST 11/12/2015 [facility] CLINICAL LEGACY    CT ABDOMEN PELVIS ANGIOGRAM W AND/OR WO IV CONTRAST  5/8/2022    CT ABDOMEN PELVIS ANGIOGRAM W AND/OR WO IV CONTRAST 5/8/2022 DOCTOR OFFICE LEGACY    CT ABDOMEN PELVIS ANGIOGRAM W AND/OR WO IV CONTRAST  4/1/2023    CT ABDOMEN PELVIS ANGIOGRAM W AND/OR WO IV CONTRAST U CT    HYSTEROSCOPY  05/09/2014    Hysteroscopy With Endometrial Ablation    OTHER SURGICAL HISTORY  04/07/2014    Left Hemicolectomy    TONSILLECTOMY  04/07/2014    Tonsillectomy    TOTAL VAGINAL HYSTERECTOMY  09/11/2014    Vaginal Hysterectomy    TUBAL LIGATION  04/07/2014    Tubal Ligation        reports that she quit smoking about 30 years ago. Her smoking use included cigarettes. She started smoking about 34 years ago. She has a 1 pack-year smoking history. She has been exposed to tobacco smoke. She has never used smokeless tobacco. She reports current alcohol use of about 2.0 standard drinks of alcohol per week. She reports that she does not use drugs.    Review of Systems         See HPI                        Objective    Vitals:    10/20/24 1611   BP: 114/76   Pulse: 59   Resp: 16   Temp: 36.8 °C (98.3 °F)   SpO2: 98%     No LMP recorded. Patient has had a hysterectomy.    Physical Exam  Constitutional:       Appearance: Normal appearance.   Cardiovascular:      Rate and Rhythm: Normal rate and regular rhythm.   Pulmonary:      Effort: Pulmonary effort is normal.      Breath  sounds: Normal breath sounds.   Skin:     General: Skin is warm and dry.      Capillary Refill: Capillary refill takes less than 2 seconds.      Comments:    Left Breast 7:00 position quarter sized abscess, no drainage, sl firm    Neurological:      General: No focal deficit present.      Mental Status: She is alert and oriented to person, place, and time.   Psychiatric:         Mood and Affect: Mood normal.         Behavior: Behavior normal.         Thought Content: Thought content normal.         Procedures    Point of Care Test & Imaging Results from this visit  No results found for this visit on 10/20/24.   No results found.    Diagnostic study results (if any) were reviewed by ALETHA Guerra.    Assessment/Plan   Allergies, medications, history, and pertinent labs/EKGs/Imaging reviewed by ALETHA Guerra.     Medical Decision Making  Cellulitis - MDM- Signs and symptoms consistent with Abscess/cellulitis.  Plan is for antibiotic therapy and symptomatic support at home. Warm compresses 3-4 times daily.  Discussed other diefferentials: breast mass, cyst, other pathology, needs ultrasound within the next 1-2 weeks contact PCP. Patient advised to return to clinic or go to the ED if symptoms change or  worsen.     Orders and Diagnoses  Diagnoses and all orders for this visit:  Breast abscess  -     cephalexin (Keflex) 500 mg capsule; Take 1 capsule (500 mg) by mouth 3 times a day for 10 days.      Medical Admin Record      Patient disposition: Home    Electronically signed by ALETHA Guerra  4:32 PM

## 2024-10-20 NOTE — PATIENT INSTRUCTIONS
Cellulitis - MDM- Signs and symptoms consistent with Abscess/cellulitis.  Plan is for antibiotic therapy and symptomatic support at home. Warm compresses 3-4 times daily.  Discussed other differentials: breast mass, cyst, other pathology, needs ultrasound within the next 1-2 weeks contact PCP. Patient advised to return to clinic or go to the ED if symptoms change or  worsen.

## 2024-10-21 ENCOUNTER — APPOINTMENT (OUTPATIENT)
Dept: PRIMARY CARE | Facility: CLINIC | Age: 64
End: 2024-10-21
Payer: MEDICARE

## 2024-10-22 ENCOUNTER — APPOINTMENT (OUTPATIENT)
Dept: PRIMARY CARE | Facility: CLINIC | Age: 64
End: 2024-10-22
Payer: MEDICARE

## 2024-10-24 ENCOUNTER — TELEPHONE (OUTPATIENT)
Dept: PRIMARY CARE | Facility: CLINIC | Age: 64
End: 2024-10-24

## 2024-10-24 ENCOUNTER — HOSPITAL ENCOUNTER (OUTPATIENT)
Dept: RADIOLOGY | Facility: EXTERNAL LOCATION | Age: 64
Discharge: HOME | End: 2024-10-24

## 2024-10-24 ENCOUNTER — OFFICE VISIT (OUTPATIENT)
Dept: PRIMARY CARE | Facility: CLINIC | Age: 64
End: 2024-10-24
Payer: MEDICARE

## 2024-10-24 VITALS
HEIGHT: 61 IN | HEART RATE: 58 BPM | TEMPERATURE: 97.2 F | BODY MASS INDEX: 34.25 KG/M2 | WEIGHT: 181.4 LBS | DIASTOLIC BLOOD PRESSURE: 62 MMHG | RESPIRATION RATE: 18 BRPM | SYSTOLIC BLOOD PRESSURE: 126 MMHG

## 2024-10-24 DIAGNOSIS — N32.81 OAB (OVERACTIVE BLADDER): ICD-10-CM

## 2024-10-24 DIAGNOSIS — N63.24 UNSPECIFIED LUMP IN THE LEFT BREAST, LOWER INNER QUADRANT: ICD-10-CM

## 2024-10-24 DIAGNOSIS — I10 ESSENTIAL HYPERTENSION, BENIGN: ICD-10-CM

## 2024-10-24 DIAGNOSIS — N18.32 STAGE 3B CHRONIC KIDNEY DISEASE (MULTI): ICD-10-CM

## 2024-10-24 DIAGNOSIS — N63.20 MASS OF LEFT BREAST, UNSPECIFIED QUADRANT: ICD-10-CM

## 2024-10-24 DIAGNOSIS — N63.20 MASS OF LEFT BREAST, UNSPECIFIED QUADRANT: Primary | ICD-10-CM

## 2024-10-24 DIAGNOSIS — N64.51 INDURATION OF BREAST: ICD-10-CM

## 2024-10-24 DIAGNOSIS — E78.00 PURE HYPERCHOLESTEROLEMIA: ICD-10-CM

## 2024-10-24 DIAGNOSIS — Z86.711 HISTORY OF PULMONARY EMBOLUS (PE): ICD-10-CM

## 2024-10-24 DIAGNOSIS — I50.32 CHRONIC DIASTOLIC HEART FAILURE: ICD-10-CM

## 2024-10-24 PROCEDURE — 3074F SYST BP LT 130 MM HG: CPT | Performed by: INTERNAL MEDICINE

## 2024-10-24 PROCEDURE — 3078F DIAST BP <80 MM HG: CPT | Performed by: INTERNAL MEDICINE

## 2024-10-24 PROCEDURE — 3044F HG A1C LEVEL LT 7.0%: CPT | Performed by: INTERNAL MEDICINE

## 2024-10-24 PROCEDURE — 3008F BODY MASS INDEX DOCD: CPT | Performed by: INTERNAL MEDICINE

## 2024-10-24 PROCEDURE — 3048F LDL-C <100 MG/DL: CPT | Performed by: INTERNAL MEDICINE

## 2024-10-24 PROCEDURE — 1036F TOBACCO NON-USER: CPT | Performed by: INTERNAL MEDICINE

## 2024-10-24 PROCEDURE — 3060F POS MICROALBUMINURIA REV: CPT | Performed by: INTERNAL MEDICINE

## 2024-10-24 PROCEDURE — 99215 OFFICE O/P EST HI 40 MIN: CPT | Performed by: INTERNAL MEDICINE

## 2024-10-24 ASSESSMENT — ENCOUNTER SYMPTOMS
ALLERGIC/IMMUNOLOGIC NEGATIVE: 1
PALPITATIONS: 0
ENDOCRINE NEGATIVE: 1
DYSURIA: 0
CONSTIPATION: 0
ACTIVITY CHANGE: 0
BLOOD IN STOOL: 0
HEADACHES: 0
WHEEZING: 0
WEAKNESS: 0
ARTHRALGIAS: 0
FATIGUE: 0
ABDOMINAL PAIN: 0
SHORTNESS OF BREATH: 0
JOINT SWELLING: 0
DIZZINESS: 0
NUMBNESS: 0
BACK PAIN: 0
AGITATION: 0
ADENOPATHY: 0
EYE REDNESS: 0
FEVER: 0
COUGH: 0
FREQUENCY: 0

## 2024-10-24 NOTE — TELEPHONE ENCOUNTER
I ordered left breast US , urgent  for left breast painful mass ( as advised per ED )  If there is issue with order, Radiology should let us know for correction

## 2024-10-24 NOTE — PROGRESS NOTES
Subjective   Patient ID: Marni Lugo is a 63 y.o. female who presents for New Patient Visit.  Prior PCP Dr Salvador    HPI   Medical History:   She has a past medical history of Abnormal finding of blood chemistry, unspecified (04/14/2014), Acute bronchospasm (04/18/2016), Acute candidiasis of vulva and vagina (07/21/2013), Acute embolism and thrombosis of unspecified vein (02/18/2014), Acute vaginitis (02/11/2016), Adjustment disorder with mixed anxiety and depressed mood (04/06/2023), Age-related nuclear cataract, bilateral (10/18/2022), Anesthesia of skin (03/16/2018), Bitten or stung by nonvenomous insect and other nonvenomous arthropods, initial encounter (09/01/2016), Carpal tunnel syndrome (04/06/2023), Cervical lymphadenitis (04/06/2023), Chronic pain of left ankle (04/06/2023), Diverticulitis of large intestine without perforation or abscess without bleeding (07/21/2013), Dry eye syndrome of unspecified lacrimal gland (10/18/2022), Encounter for follow-up examination after completed treatment for conditions other than malignant neoplasm (11/18/2015), Encounter for immunization (02/20/2016), Encounter for other preprocedural examination (10/09/2015), Encounter for screening for infections with a predominantly sexual mode of transmission (03/16/2018), Encounter for screening for infections with a predominantly sexual mode of transmission (02/28/2018), Encounter for screening for infections with a predominantly sexual mode of transmission (02/28/2018), Encounter for screening for malignant neoplasm of colon (11/23/2020), Excessive and frequent menstruation with irregular cycle (07/21/2013), Facial cellulitis (05/17/2023), Follicular disorder, unspecified (04/02/2021), Hemorrhage of anus and rectum (05/09/2014), Hot flashes, menopausal (04/06/2023), Hyperlipidemia, unspecified (07/21/2013), Impetigo, unspecified (04/02/2021), Incarcerated umbilical hernia (04/06/2023), Inflamed seborrheic keratosis  (04/02/2021), Lateral epicondylitis, unspecified elbow (03/16/2018), Lateral epicondylitis, unspecified elbow (02/28/2018), Lateral epicondylitis, unspecified elbow (02/28/2018), Left sided colitis without complications (Multi) (07/21/2013), Lumbosacral spondylosis (04/06/2023), Myopia, bilateral (10/18/2022), Nevus of left conjunctiva (04/06/2023), Obstructive sleep apnea (adult) (pediatric) (07/21/2013), Other chest pain (05/08/2019), Other conditions influencing health status (12/28/2020), Other conditions influencing health status (05/08/2019), Other enthesopathies, not elsewhere classified (05/06/2014), Other hypersomnia (11/08/2017), 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), Other specified abnormal findings of blood chemistry (10/07/2016), Other symptoms and signs involving the genitourinary system (11/23/2020), Other symptoms and signs involving the nervous system (04/13/2017), Personal history of diseases of the blood and blood-forming organs and certain disorders involving the immune mechanism (10/18/2022), Personal history of diseases of the skin and subcutaneous tissue, Personal history of other diseases of the circulatory system (07/10/2020), Personal history of other diseases of the female genital tract (10/26/2020), Personal history of other diseases of the musculoskeletal system and connective tissue (01/16/2018), Personal history of other diseases of the respiratory system (07/21/2013), Personal history of other diseases of the respiratory system (04/14/2014), Personal history of other diseases of urinary system (02/02/2022), Personal history of other diseases of urinary system (11/01/2021), Personal history of other infectious and parasitic diseases (04/14/2014), Personal history of other infectious and parasitic diseases, Personal history of other specified conditions (07/21/2020), Personal history of other specified conditions (01/18/2017), Personal history of other specified conditions  (11/23/2020), Personal history of other specified conditions (07/14/2022), Personal history of other specified conditions (10/14/2020), Personal history of other specified conditions (06/18/2016), Personal history of other specified conditions (07/26/2016), Personal history of other specified conditions (06/18/2016), Personal history of peptic ulcer disease, Personal history of pulmonary embolism (08/21/2020), Personal history of urinary (tract) infections (11/05/2015), Piriformis syndrome of right side (04/06/2023), Postnasal drip (04/14/2014), Primary osteoarthritis of first carpometacarpal joint of left hand (04/06/2023), Rash and other nonspecific skin eruption (07/14/2022), Right upper quadrant abdominal tenderness (05/22/2017), Spondylolisthesis, acquired (04/06/2023), Type 2 diabetes mellitus (Multi) (11/21/2022), Type 2 diabetes mellitus without complication, without long-term current use of insulin (Multi) (04/06/2023), Ulcerative blepharitis unspecified eye, unspecified eyelid (09/21/2018), Ulnar neuropathy at elbow of right upper extremity (04/06/2023), Unspecified exophthalmos (07/21/2013), Unspecified exophthalmos (10/18/2022), Urinary tract infection, site not specified (04/14/2014), and Vascular disorder of intestine, unspecified (Multi) (07/21/2013).    Recent visit to ED on 10/20/24 for left breast mass , diagnosed with cellulitis / abscess , given script for cephalexin for 10 days , she did not take them, believes this is something different and requests for US   No fever, discharge, she does have pain ,and tenderness to palpation    Surgical History:   Surgical History         Past Surgical History:   Procedure Laterality Date    CARDIAC CATHETERIZATION N/A 8/19/2024     Procedure: Left Heart Cath, With LV;  Surgeon: Paul He MD;  Location: [facility] Cardiac Cath Lab;  Service: Cardiovascular;  Laterality: N/A;    CARDIAC CATHETERIZATION N/A 8/19/2024     Procedure: PCI ETHAN Stent- Coronary;   Surgeon: Paul He MD;  Location: [facility] Cardiac Cath Lab;  Service: Cardiovascular;  Laterality: N/A;    CARDIAC CATHETERIZATION N/A 8/19/2024     Procedure: IVUS - Coronary;  Surgeon: Paul He MD;  Location: [facility] Cardiac Cath Lab;  Service: Cardiovascular;  Laterality: N/A;    CT ABDOMEN PELVIS ANGIOGRAM W AND/OR WO IV CONTRAST   11/12/2015     CT ABDOMEN PELVIS ANGIOGRAM W AND/OR WO IV CONTRAST 11/12/2015 [facility] CLINICAL LEGACY    CT ABDOMEN PELVIS ANGIOGRAM W AND/OR WO IV CONTRAST   5/8/2022     CT ABDOMEN PELVIS ANGIOGRAM W AND/OR WO IV CONTRAST 5/8/2022 DOCTOR OFFICE LEGACY    CT ABDOMEN PELVIS ANGIOGRAM W AND/OR WO IV CONTRAST   4/1/2023     CT ABDOMEN PELVIS ANGIOGRAM W AND/OR WO IV CONTRAST U CT    HYSTEROSCOPY   05/09/2014     Hysteroscopy With Endometrial Ablation    OTHER SURGICAL HISTORY   04/07/2014     Left Hemicolectomy    TONSILLECTOMY   04/07/2014     Tonsillectomy    TOTAL VAGINAL HYSTERECTOMY   09/11/2014     Vaginal Hysterectomy    TUBAL LIGATION   04/07/2014     Tubal Ligation      PSHP@  Social History:   Social Determinants of Health with Concerns           Tobacco Use: Medium Risk (9/3/2024)     Patient History      Smoking Tobacco Use: Former      Smokeless Tobacco Use: Never      Passive Exposure: Past   Alcohol Use: Not on file                                       Family History:   Family History[]Expand by Default          Family History   Problem Relation Name Age of Onset    Heart disease Mother        Kidney failure Father        Cirrhosis Sister        Breast cancer Neg Hx             Allergies:  Adhesive and Latex   Review of Systems   Constitutional:  Negative for activity change, fatigue and fever.   HENT:  Negative for congestion.    Eyes:  Negative for redness and visual disturbance.   Respiratory:  Negative for cough, shortness of breath and wheezing.    Cardiovascular:  Negative for chest pain, palpitations and leg swelling.   Gastrointestinal:  Negative for abdominal  "pain, blood in stool and constipation.   Endocrine: Negative.    Genitourinary:  Negative for dysuria, frequency and urgency.   Musculoskeletal:  Negative for arthralgias, back pain and joint swelling.   Skin:  Negative for rash.        Left breast quarter size painful mass   Allergic/Immunologic: Negative.    Neurological:  Negative for dizziness, weakness, numbness and headaches.   Hematological:  Negative for adenopathy.   Psychiatric/Behavioral:  Negative for agitation and behavioral problems.        Objective   /62 (BP Location: Right arm, Patient Position: Sitting, BP Cuff Size: Adult)   Pulse 58   Temp 36.2 °C (97.2 °F) (Temporal)   Resp 18   Ht 1.549 m (5' 1\")   Wt 82.3 kg (181 lb 6.4 oz)   BMI 34.28 kg/m²     Physical Exam  Constitutional:       Appearance: Normal appearance. She is obese.   HENT:      Head: Normocephalic and atraumatic.      Right Ear: Tympanic membrane and external ear normal.      Left Ear: Tympanic membrane and external ear normal.      Nose: No congestion.      Mouth/Throat:      Mouth: Mucous membranes are moist.      Pharynx: Oropharynx is clear.   Eyes:      Extraocular Movements: Extraocular movements intact.      Conjunctiva/sclera: Conjunctivae normal.      Pupils: Pupils are equal, round, and reactive to light.   Cardiovascular:      Rate and Rhythm: Normal rate and regular rhythm.      Pulses: Normal pulses.      Heart sounds: Normal heart sounds. No murmur heard.  Pulmonary:      Effort: Pulmonary effort is normal.      Breath sounds: Normal breath sounds. No wheezing or rales.   Abdominal:      General: Abdomen is flat. Bowel sounds are normal.      Palpations: Abdomen is soft.      Hernia: No hernia is present.   Musculoskeletal:         General: No swelling or tenderness. Normal range of motion.      Cervical back: Normal range of motion and neck supple.      Right lower leg: No edema.      Left lower leg: No edema.      Comments: She wears knee high stockings "   Skin:     General: Skin is warm and dry.      Capillary Refill: Capillary refill takes less than 2 seconds.      Findings: Lesion present. No rash.      Comments:  Left Breast 7:00 position quarter sized abscess, no drainage, sl firm     Neurological:      General: No focal deficit present.      Mental Status: She is alert and oriented to person, place, and time.   Psychiatric:         Mood and Affect: Mood normal.         Behavior: Behavior normal.         Assessment/Plan        Left breast mass  / Abscess :  Urgent US of left breast , she will go to [facility]  Encouraged to finish course of ATB  Warm compresses, otc tylenol for comfort  Consider to see General surgeon , pending result of US      CAD status post PCI  HFpEF/diastolic dysfunction  Recurrent VTE  Hyperlipidemia  Essential hypertension     Type 2 DM with microalbuminuria- On Jardiance 25 mg, She is on Rosuvastatin, Amlodipine 10 mg daily,      Has CHF, seeing Dr Paul He . She is doing ok on Furosemide. No shortness of breath. Also on it for venous insufficiency. Using compression socks. On Sildenafil, but is for Raynauds disease of fingers.      Antiphospholipid antibody syndrome - On Eliquis now , was taking coumadin before  Has h/o DVT and PE    HTN :   Amlodipine, Metoprolol     OAB :   Gemtesa 75 mg daily    RLS :  Sinemet daily , per neurology    Headaches :   Topamax   Follows with Neurology      Hyperlipidemia - on Rosuvastatin 40 mg daily. No side effects. Eating not good per patient. Currently is staying with daughter to help her during divorce.      CKD3 - saw nephrology, Dr Yosi PINZON done . Has labs ordered.       [facility]  Flushot, covid booster at pharmacy  Prevnar 20 , shingrix, RSV at pharmacy

## 2024-10-24 NOTE — TELEPHONE ENCOUNTER
PATIENT CALLED RX LINE ASKING FOR STAFF MEMBER TO CALL HER BACK IN REGARDS TO HER ORDER FOR HER BREAST

## 2024-11-25 ENCOUNTER — OFFICE VISIT (OUTPATIENT)
Dept: URGENT CARE | Age: 64
End: 2024-11-25
Payer: MEDICARE

## 2024-11-25 ENCOUNTER — ANCILLARY PROCEDURE (OUTPATIENT)
Dept: URGENT CARE | Age: 64
End: 2024-11-25
Payer: MEDICARE

## 2024-11-25 DIAGNOSIS — Z86.718 HISTORY OF DVT (DEEP VEIN THROMBOSIS): ICD-10-CM

## 2024-11-25 DIAGNOSIS — M79.671 RIGHT FOOT PAIN: ICD-10-CM

## 2024-11-25 DIAGNOSIS — M79.674 PAIN OF RIGHT GREAT TOE: Primary | ICD-10-CM

## 2024-11-25 DIAGNOSIS — I10 PRIMARY HYPERTENSION: ICD-10-CM

## 2024-11-25 DIAGNOSIS — N61.1 BREAST ABSCESS: Primary | ICD-10-CM

## 2024-11-25 RX ORDER — PREDNISONE 20 MG/1
40 TABLET ORAL DAILY
Qty: 10 TABLET | Refills: 0 | Status: SHIPPED | OUTPATIENT
Start: 2024-11-25 | End: 2024-11-30

## 2024-11-25 RX ORDER — ACETAMINOPHEN 500 MG
1000 TABLET ORAL ONCE
Status: SHIPPED | OUTPATIENT
Start: 2024-11-25

## 2024-11-25 NOTE — PATIENT INSTRUCTIONS
Xray negative for fracture  Suspect gout, needs to follow up with PCP for labs  Unable to take NSAIDs d/t Eliquis  Prednisone and acetaminophen for pain  rest the injured area as much as practical, apply ice packs, elevate the injured limb  See orders for this visit as documented in the electronic medical record.

## 2024-11-25 NOTE — PROGRESS NOTES
SUBJECTIVE:  Marni Lugo is a 63 y.o. female who sustained a right foot injury 5 day(s) ago. Mechanism of injury: accidentally kicked the refrigerator. Immediate symptoms: immediate pain. Symptoms have been acute and constant since that time. Prior history of related problems: no prior problems with this area in the past.    OBJECTIVE:  Vital signs as noted above.  Appearance: alert, well appearing, and in no distress.  Foot/ankle exam: ankle joint is intact, erythema and tenderness right great metatarsal .  X-ray: no fracture or dislocation noted.    ASSESSMENT:  foot sprain  Also Suspect gout right great toe/metatarsal    PLAN:  Suspect gout, needs to follow up with PCP for labs  Unable to take NSAIDs d/t Eliquis  Prednisone and acetaminophen for pain  rest the injured area as much as practical, apply ice packs, elevate the injured limb  See orders for this visit as documented in the electronic medical record.

## 2024-11-27 DIAGNOSIS — E78.2 MIXED HYPERLIPIDEMIA: ICD-10-CM

## 2024-11-27 RX ORDER — ROSUVASTATIN CALCIUM 40 MG/1
40 TABLET, COATED ORAL DAILY
Qty: 90 TABLET | Refills: 1 | Status: SHIPPED | OUTPATIENT
Start: 2024-11-27 | End: 2025-05-26

## 2024-12-11 DIAGNOSIS — I20.9 ANGINA PECTORIS: ICD-10-CM

## 2024-12-11 DIAGNOSIS — Z95.5 S/P CORONARY ARTERY STENT PLACEMENT: ICD-10-CM

## 2024-12-11 RX ORDER — CLOPIDOGREL BISULFATE 75 MG/1
75 TABLET ORAL DAILY
Qty: 90 TABLET | Refills: 1 | Status: SHIPPED | OUTPATIENT
Start: 2024-12-11 | End: 2025-06-09

## 2024-12-11 NOTE — TELEPHONE ENCOUNTER
----- Message from Nurse Yolande JOSE sent at 12/11/2024  1:57 PM EST -----  Regarding: Refill  Patient is requesting a refill of clopidogrel 75 mg to be sent to [facility] phone is [phone]. She only has one left. Patient's phone is [phone] if needed.

## 2024-12-18 ENCOUNTER — TELEPHONE (OUTPATIENT)
Dept: RHEUMATOLOGY | Facility: CLINIC | Age: 64
End: 2024-12-18
Payer: MEDICARE

## 2024-12-18 NOTE — TELEPHONE ENCOUNTER
Patient called and she has a gout flare in her big toe.  Very painful she wanted to know if you could prescribe medication to help her?    [phone]    Pharmacy is [facility] [phone]       Olga Lidia ribeiro  I called patient and let her know what the doctor said

## 2025-01-07 ENCOUNTER — OFFICE VISIT (OUTPATIENT)
Dept: CARDIOLOGY | Facility: CLINIC | Age: 65
End: 2025-01-07
Payer: MEDICARE

## 2025-01-07 VITALS
OXYGEN SATURATION: 98 % | SYSTOLIC BLOOD PRESSURE: 130 MMHG | BODY MASS INDEX: 33.63 KG/M2 | DIASTOLIC BLOOD PRESSURE: 82 MMHG | WEIGHT: 178 LBS | HEART RATE: 69 BPM

## 2025-01-07 DIAGNOSIS — Z86.711 HISTORY OF PULMONARY EMBOLUS (PE): Primary | ICD-10-CM

## 2025-01-07 PROCEDURE — 99213 OFFICE O/P EST LOW 20 MIN: CPT | Performed by: INTERNAL MEDICINE

## 2025-01-07 PROCEDURE — 3075F SYST BP GE 130 - 139MM HG: CPT | Performed by: INTERNAL MEDICINE

## 2025-01-07 PROCEDURE — 3079F DIAST BP 80-89 MM HG: CPT | Performed by: INTERNAL MEDICINE

## 2025-01-07 NOTE — PROGRESS NOTES
History of Present Illness:  Marni Lugo is a/an 64 y.o. woman with history of DVT/PE, mesenteric thrombosis, initially maintained on long-term anticoagulation with warfarin, later transitioned to apixaban (by her primary).    She denies bleeding including epistaxis, gingival bleeding, hemoptysis, hematemesis, hematochezia, melena, hematuria, and vaginal bleeding.    Also with HFpEF, CKD3, remote bladder cancer, sarcoidosis.     Had coronary stent since last visit.    Has Raynaud's in hands.    She was last seen on 8/14/2023.      Past Medical History:   Diagnosis Date    Abnormal finding of blood chemistry, unspecified 04/14/2014    Abnormal blood chemistry    Acute bronchospasm 04/18/2016    Bronchospasm    Acute candidiasis of vulva and vagina 07/21/2013    Vaginitis due to Candida albicans    Acute embolism and thrombosis of unspecified vein 02/18/2014    Venous thrombosis    Acute vaginitis 02/11/2016    Bacterial vaginosis    Adjustment disorder with mixed anxiety and depressed mood 04/06/2023    Age-related nuclear cataract, bilateral 10/18/2022    Nuclear sclerosis of both eyes    Anesthesia of skin 03/16/2018    Numbness and tingling in both hands    Bitten or stung by nonvenomous insect and other nonvenomous arthropods, initial encounter 09/01/2016    Bug bite, initial encounter    Carpal tunnel syndrome 04/06/2023    Cervical lymphadenitis 04/06/2023    Chronic pain of left ankle 04/06/2023    Diverticulitis of large intestine without perforation or abscess without bleeding 07/21/2013    Diverticulitis of colon    Dry eye syndrome of unspecified lacrimal gland 10/18/2022    Dry eye syndrome    Encounter for follow-up examination after completed treatment for conditions other than malignant neoplasm 11/18/2015    Hospital discharge follow-up    Encounter for immunization 02/20/2016    Immunization due    Encounter for other preprocedural examination 10/09/2015    Pre-operative clearance    Encounter  for screening for infections with a predominantly sexual mode of transmission 03/16/2018    Screening for STD (sexually transmitted disease)    Encounter for screening for infections with a predominantly sexual mode of transmission 02/28/2018    Screening for STD (sexually transmitted disease)    Encounter for screening for infections with a predominantly sexual mode of transmission 02/28/2018    Screening for STD (sexually transmitted disease)    Encounter for screening for malignant neoplasm of colon 11/23/2020    Encounter for screening colonoscopy    Excessive and frequent menstruation with irregular cycle 07/21/2013    Metrorrhagia    Facial cellulitis 05/17/2023    Follicular disorder, unspecified 04/02/2021    Hemorrhage of anus and rectum 05/09/2014    Rectal hemorrhage    Hot flashes, menopausal 04/06/2023    Hyperlipidemia, unspecified 07/21/2013    Hyperlipidemia    Impetigo, unspecified 04/02/2021    Incarcerated umbilical hernia 04/06/2023    Surgery 4/1/23. Patient doing well. Dr. Tawanda Munoz.     Inflamed seborrheic keratosis 04/02/2021    Lateral epicondylitis, unspecified elbow 03/16/2018    Tennis elbow    Lateral epicondylitis, unspecified elbow 02/28/2018    Tennis elbow    Lateral epicondylitis, unspecified elbow 02/28/2018    Tennis elbow    Left sided colitis without complications (Multi) 07/21/2013    Ulcerative colitis, left sided    Lumbosacral spondylosis 04/06/2023    Myopia, bilateral 10/18/2022    Myopia of both eyes with astigmatism and presbyopia    Nevus of left conjunctiva 04/06/2023    Obstructive sleep apnea (adult) (pediatric) 07/21/2013    Obstructive sleep apnea    Other chest pain 05/08/2019    Chest tightness    Other conditions influencing health status 12/28/2020    Colonoscopy planned    Other conditions influencing health status 05/08/2019    History of cough    Other enthesopathies, not elsewhere classified 05/06/2014    Tendinitis of right shoulder    Other  hypersomnia 11/08/2017    Excessive daytime sleepiness    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    Protein S deficiency    Other specified abnormal findings of blood chemistry 10/07/2016    Elevated serum creatinine    Other symptoms and signs involving the genitourinary system 11/23/2020    Abnormal urogenital discharge    Other symptoms and signs involving the nervous system 04/13/2017    Suspected sleep apnea    Personal history of diseases of the blood and blood-forming organs and certain disorders involving the immune mechanism 10/18/2022    History of sarcoidosis    Personal history of diseases of the skin and subcutaneous tissue     History of dermatitis    Personal history of other diseases of the circulatory system 07/10/2020    History of congestive heart failure    Personal history of other diseases of the female genital tract 10/26/2020    History of abnormal uterine bleeding    Personal history of other diseases of the musculoskeletal system and connective tissue 01/16/2018    History of lateral epicondylitis    Personal history of other diseases of the respiratory system 07/21/2013    History of acute bronchitis    Personal history of other diseases of the respiratory system 04/14/2014    Personal history of sinusitis    Personal history of other diseases of urinary system 02/02/2022    History of hematuria    Personal history of other diseases of urinary system 11/01/2021    History of hematuria    Personal history of other infectious and parasitic diseases 04/14/2014    History of trichomoniasis    Personal history of other infectious and parasitic diseases     History of herpes simplex infection    Personal history of other specified conditions 07/21/2020    History of chest pain    Personal history of other specified conditions 01/18/2017    History of dysuria    Personal history of other specified conditions 11/23/2020    History of diarrhea    Personal history of other specified conditions  07/14/2022    History of itching    Personal history of other specified conditions 10/14/2020    History of dyspnea    Personal history of other specified conditions 06/18/2016    History of excessive sweating    Personal history of other specified conditions 07/26/2016    History of diarrhea    Personal history of other specified conditions 06/18/2016    History of nausea    Personal history of peptic ulcer disease     History of peptic ulcer    Personal history of pulmonary embolism 08/21/2020    History of pulmonary embolism    Personal history of urinary (tract) infections 11/05/2015    History of urinary tract infection    Piriformis syndrome of right side 04/06/2023    Postnasal drip 04/14/2014    Post-nasal drainage    Primary osteoarthritis of first carpometacarpal joint of left hand 04/06/2023    Rash and other nonspecific skin eruption 07/14/2022    Rash    Right upper quadrant abdominal tenderness 05/22/2017    Abdominal tenderness, right upper quadrant    Spondylolisthesis, acquired 04/06/2023    Type 2 diabetes mellitus 11/21/2022    Formatting of this note might be different from the original. Comment on above: Added by Problem List Migration; 2013-7-21; Moved to 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    Ulcerative blepharitis    Ulnar neuropathy at elbow of right upper extremity 04/06/2023    Unspecified exophthalmos 07/21/2013    Exophthalmos    Unspecified exophthalmos 10/18/2022    Ocular proptosis    Urinary tract infection, site not specified 04/14/2014    Acute UTI    Vascular disorder of intestine, unspecified 07/21/2013    Ischemic colitis     Past Surgical History:   Procedure Laterality Date    CARDIAC CATHETERIZATION N/A 8/19/2024    Procedure: Left Heart Cath, With LV;  Surgeon: Paul He MD;  Location: [facility]  Cardiac Cath Lab;  Service: Cardiovascular;  Laterality: N/A;    CARDIAC CATHETERIZATION N/A 2024    Procedure: PCI ETHAN Stent- Coronary;  Surgeon: Paul He MD;  Location: [facility] Cardiac Cath Lab;  Service: Cardiovascular;  Laterality: N/A;    CARDIAC CATHETERIZATION N/A 2024    Procedure: IVUS - Coronary;  Surgeon: Paul He MD;  Location: [facility] Cardiac Cath Lab;  Service: Cardiovascular;  Laterality: N/A;    CT ABDOMEN PELVIS ANGIOGRAM W AND/OR WO IV CONTRAST  2015    CT ABDOMEN PELVIS ANGIOGRAM W AND/OR WO IV CONTRAST 2015 [facility] CLINICAL LEGACY    CT ABDOMEN PELVIS ANGIOGRAM W AND/OR WO IV CONTRAST  2022    CT ABDOMEN PELVIS ANGIOGRAM W AND/OR WO IV CONTRAST 2022 DOCTOR OFFICE LEGACY    CT ABDOMEN PELVIS ANGIOGRAM W AND/OR WO IV CONTRAST  2023    CT ABDOMEN PELVIS ANGIOGRAM W AND/OR WO IV CONTRAST U CT    HYSTEROSCOPY  2014    Hysteroscopy With Endometrial Ablation    OTHER SURGICAL HISTORY  2014    Left Hemicolectomy    TONSILLECTOMY  2014    Tonsillectomy    TOTAL VAGINAL HYSTERECTOMY  2014    Vaginal Hysterectomy    TUBAL LIGATION  2014    Tubal Ligation     Social History     Tobacco Use    Smoking status: Former     Current packs/day: 0.00     Average packs/day: 0.3 packs/day for 4.0 years (1.0 ttl pk-yrs)     Types: Cigarettes     Start date:      Quit date:      Years since quittin.0     Passive exposure: Past    Smokeless tobacco: Never   Vaping Use    Vaping status: Never Used   Substance Use Topics    Alcohol use: Yes     Alcohol/week: 2.0 standard drinks of alcohol     Types: 2 Glasses of wine per week     Comment: twice a month    Drug use: Never     Family History   Problem Relation Name Age of Onset    Heart disease Mother      Kidney failure Father      Cirrhosis Sister      Breast cancer Neg Hx       Current Outpatient Medications   Medication Sig Dispense Refill    albuterol 2.5 mg /3 mL (0.083 %) nebulizer  solution Take 3 mL (2.5 mg) by nebulization every 6 hours if needed.      albuterol 90 mcg/actuation inhaler Inhale 2 puffs every 6 hours if needed for shortness of breath or wheezing. 18 g 2    amLODIPine (Norvasc) 10 mg tablet Take 1 tablet (10 mg) by mouth once daily. 90 tablet 3    apixaban (Eliquis) 5 mg tablet Take 1 tablet (5 mg) by mouth 2 times a day. 60 tablet 11    carbidopa-levodopa (Sinemet)  mg tablet TAKE 1 AND 1/2 TABLETS BY MOUTH DAILY 135 tablet 2    cholecalciferol, vitamin D3, (VITAMIN D3 ORAL) Take by mouth.      clopidogrel (Plavix) 75 mg tablet Take 1 tablet (75 mg) by mouth once daily. 90 tablet 1    empagliflozin (Jardiance) 25 mg Take 1 tablet (25 mg) by mouth once daily. 90 tablet 3    famotidine (Pepcid) 20 mg tablet Take 1 tablet (20 mg) by mouth 2 times a day. 180 tablet 3    fluticasone (Flonase) 50 mcg/actuation nasal spray SHAKE LIQUID AND USE 1 TO 2 SPRAYS IN EACH NOSTRIL DAILY AS NEEDED FOR SYMPTOMS OR ALLERGY      furosemide (Lasix) 20 mg tablet TAKE 1 TABLET BY MOUTH EVERY DAY AS NEEDED FOR LEG SWELLING 90 tablet 3    Gemtesa 75 mg tablet Take 1 tablet (75 mg) by mouth once daily. 30 tablet 11    metoprolol tartrate (Lopressor) 25 mg tablet Take 1 tablet (25 mg) by mouth 2 times a day. 60 tablet 11    nitroglycerin (Nitrostat) 0.4 mg SL tablet Place 1 tablet (0.4 mg) under the tongue every 5 minutes if needed for chest pain. 25 tablet 2    pantoprazole (ProtoNix) 40 mg EC tablet Take 1 tablet (40 mg) by mouth once daily.      rosuvastatin (Crestor) 40 mg tablet Take 1 tablet (40 mg) by mouth once daily. 90 tablet 1    sildenafil (Revatio) 20 mg tablet Take 1 tablet (20 mg) by mouth 2 times a day.      topiramate (Topamax) 25 mg tablet Take 4 tablets (100 mg) by mouth once daily. 360 tablet 3    valACYclovir (Valtrex) 500 mg tablet Take 1 tablet (500 mg) by mouth once daily. 90 tablet 3     Current Facility-Administered Medications   Medication Dose Route Frequency Provider  Last Rate Last Admin    acetaminophen (Tylenol) tablet 1,000 mg  1,000 mg oral Once Nichelle Coronado, APRN-CNP           Physical Examination:  Blood pressure 130/82, pulse 69, weight 80.7 kg (178 lb), SpO2 98%.  No distress  No JVD or carotid bruits  Lungs clear bilaterally  Heart regular and without murmurs  Abdomen soft and non-tender  Mild bilateral leg swelling  Pulses intact      Pertinent Labs:  Component      Latest Ref Rng 8/22/2024   GLUCOSE      74 - 99 mg/dL 106 (H)    SODIUM      136 - 145 mmol/L 142    POTASSIUM      3.5 - 5.3 mmol/L 3.9    CHLORIDE      98 - 107 mmol/L 112 (H)    Bicarbonate      21 - 32 mmol/L 24    Anion Gap      10 - 20 mmol/L 10    Blood Urea Nitrogen      6 - 23 mg/dL 13    Creatinine      0.50 - 1.05 mg/dL 1.08 (H)    EGFR      >60 mL/min/1.73m*2 58 (L)    Calcium      8.6 - 10.3 mg/dL 8.9    PHOSPHORUS      2.5 - 4.9 mg/dL 3.4    Albumin      3.4 - 5.0 g/dL 3.9    WBC      4.4 - 11.3 x10*3/uL 7.5    nRBC      0.0 - 0.0 /100 WBCs 0.0    RBC      4.00 - 5.20 x10*6/uL 5.11    HEMOGLOBIN      12.0 - 16.0 g/dL 14.7    HEMATOCRIT      36.0 - 46.0 % 44.7    MCV      80 - 100 fL 88    MCH      26.0 - 34.0 pg 28.8    MCHC      32.0 - 36.0 g/dL 32.9    RED CELL DISTRIBUTION WIDTH      11.5 - 14.5 % 14.6 (H)    Platelets      150 - 450 x10*3/uL 300       Legend:  (H) High  (L) Low    Pertinent Imaging:  Venous duplex ultrasound 6/22/2024   FINDINGS:   The right common femoral, profunda, femoral, and popliteal veins  demonstrated normal compressibility, normal phasic venous flow and  normal response to augmentation. There is no evidence for echogenic  thrombi. The visualized deep calf veins are patent.    The contralateral common femoral vein is free of thrombosis.  IMPRESSION:  No evidence for DVT within the right lower extremity.    Diagnoses and all orders for this visit:  History of pulmonary embolus (PE) (Primary)  Continue indefinite anticoagulation     Follow up in one  year.    Adamaris Nguyen MD, MS

## 2025-01-10 DIAGNOSIS — G43.009 MIGRAINE WITHOUT AURA AND WITHOUT STATUS MIGRAINOSUS, NOT INTRACTABLE: ICD-10-CM

## 2025-01-10 RX ORDER — TOPIRAMATE 25 MG/1
100 TABLET ORAL DAILY
Qty: 360 TABLET | Refills: 3 | Status: SHIPPED | OUTPATIENT
Start: 2025-01-10

## 2025-02-17 ENCOUNTER — OFFICE VISIT (OUTPATIENT)
Dept: URGENT CARE | Facility: CLINIC | Age: 65
End: 2025-02-17
Payer: MEDICARE

## 2025-02-17 VITALS
OXYGEN SATURATION: 97 % | TEMPERATURE: 99.3 F | HEART RATE: 95 BPM | BODY MASS INDEX: 35.04 KG/M2 | DIASTOLIC BLOOD PRESSURE: 88 MMHG | WEIGHT: 185.6 LBS | HEIGHT: 61 IN | SYSTOLIC BLOOD PRESSURE: 146 MMHG

## 2025-02-17 DIAGNOSIS — J22 LOWER RESPIRATORY INFECTION: Primary | ICD-10-CM

## 2025-02-17 PROCEDURE — 99214 OFFICE O/P EST MOD 30 MIN: CPT | Performed by: PHYSICIAN ASSISTANT

## 2025-02-17 PROCEDURE — 1036F TOBACCO NON-USER: CPT | Performed by: PHYSICIAN ASSISTANT

## 2025-02-17 PROCEDURE — 3077F SYST BP >= 140 MM HG: CPT | Performed by: PHYSICIAN ASSISTANT

## 2025-02-17 PROCEDURE — 3008F BODY MASS INDEX DOCD: CPT | Performed by: PHYSICIAN ASSISTANT

## 2025-02-17 PROCEDURE — 3079F DIAST BP 80-89 MM HG: CPT | Performed by: PHYSICIAN ASSISTANT

## 2025-02-17 RX ORDER — METHYLPREDNISOLONE 4 MG/1
TABLET ORAL
Qty: 21 TABLET | Refills: 0 | Status: SHIPPED | OUTPATIENT
Start: 2025-02-17 | End: 2025-02-17 | Stop reason: ENTERED-IN-ERROR

## 2025-02-17 RX ORDER — BENZONATATE 200 MG/1
200 CAPSULE ORAL 3 TIMES DAILY PRN
Qty: 21 CAPSULE | Refills: 0 | Status: SHIPPED | OUTPATIENT
Start: 2025-02-17 | End: 2025-02-21 | Stop reason: WASHOUT

## 2025-02-17 RX ORDER — DOXYCYCLINE 100 MG/1
100 CAPSULE ORAL 2 TIMES DAILY
Qty: 20 CAPSULE | Refills: 0 | Status: SHIPPED | OUTPATIENT
Start: 2025-02-17 | End: 2025-02-27

## 2025-02-17 RX ORDER — PREDNISONE 10 MG/1
TABLET ORAL
Qty: 12 TABLET | Refills: 0 | Status: SHIPPED | OUTPATIENT
Start: 2025-02-17 | End: 2025-02-21 | Stop reason: WASHOUT

## 2025-02-17 ASSESSMENT — ENCOUNTER SYMPTOMS
SHORTNESS OF BREATH: 0
MYALGIAS: 0
HEMOPTYSIS: 0
RHINORRHEA: 0
FEVER: 0
WEIGHT LOSS: 0
CHILLS: 0
HEADACHES: 1
HEARTBURN: 0
SWEATS: 0
SORE THROAT: 0
WHEEZING: 0
COUGH: 1

## 2025-02-17 ASSESSMENT — COPD QUESTIONNAIRES: COPD: 0

## 2025-02-17 NOTE — PATIENT INSTRUCTIONS
Low dose prednisone Rx - has taken before without issues with her DM2 for sarcoidosis  Doxycycline Rx - relatives with similar Sx did not respond to Augmentin or Zpak  Tessalon given for cough   Increase rest and fluids  ER if worsening symptoms such as chest pain, shortness of breath, coughing up blood, fever that cannot be controlled with Tylenol, severe headache, or other worsening/concerning symptoms

## 2025-02-17 NOTE — PROGRESS NOTES
Subjective   History  Marni Lugo is a 64 y.o. female who presents for Cough.      History provided by:  Patient  Cough  This is a new problem. The current episode started 1 to 4 weeks ago (1.5 weeks a lot). The problem has been gradually worsening. The problem occurs constantly. The cough is Productive of purulent sputum. Associated symptoms include headaches, nasal congestion and postnasal drip. Pertinent negatives include no chest pain, chills, ear congestion, ear pain, fever, heartburn, hemoptysis, myalgias, rash, rhinorrhea, sore throat, shortness of breath, sweats, weight loss or wheezing. Nothing aggravates the symptoms. Treatments tried: Tylenol. The treatment provided no relief. Her past medical history is significant for asthma, bronchitis and pneumonia. There is no history of bronchiectasis, COPD, emphysema or environmental allergies.     Daughter was sick around her. She went to [facility]. Was told she had the flu, but wasn't tested. She then followed up with PCP and was Dx with URI. Daughter went to ER yesterday. Grandson has the same thing. Took Amoxicillin, but didn't help. Pt does have a Hx of sarcoidosis. She has been wheezing more than usual as well. Is having to sit in a chair because the drainage is so bad that she can't breathe. The cough is the worst Sx. Tylenol did not work for headaches. Has not been taking anything else. She is on Eliquis, so she cannot take NSAIDs. She does have an albuterol inhaler, but hasn't tried it yet.       Past Surgical History:   Procedure Laterality Date    CARDIAC CATHETERIZATION N/A 8/19/2024    Procedure: Left Heart Cath, With LV;  Surgeon: Paul He MD;  Location: [facility] Cardiac Cath Lab;  Service: Cardiovascular;  Laterality: N/A;    CARDIAC CATHETERIZATION N/A 8/19/2024    Procedure: PCI ETHAN Stent- Coronary;  Surgeon: Paul He MD;  Location: [facility] Cardiac Cath Lab;  Service: Cardiovascular;  Laterality: N/A;    CARDIAC CATHETERIZATION N/A  2024    Procedure: IVUS - Coronary;  Surgeon: Paul He MD;  Location: [facility] Cardiac Cath Lab;  Service: Cardiovascular;  Laterality: N/A;    CT ABDOMEN PELVIS ANGIOGRAM W AND/OR WO IV CONTRAST  2015    CT ABDOMEN PELVIS ANGIOGRAM W AND/OR WO IV CONTRAST 2015 [facility] CLINICAL LEGACY    CT ABDOMEN PELVIS ANGIOGRAM W AND/OR WO IV CONTRAST  2022    CT ABDOMEN PELVIS ANGIOGRAM W AND/OR WO IV CONTRAST 2022 DOCTOR OFFICE LEGACY    CT ABDOMEN PELVIS ANGIOGRAM W AND/OR WO IV CONTRAST  2023    CT ABDOMEN PELVIS ANGIOGRAM W AND/OR WO IV CONTRAST [facility]    HYSTEROSCOPY  2014    Hysteroscopy With Endometrial Ablation    OTHER SURGICAL HISTORY  2014    Left Hemicolectomy    TONSILLECTOMY  2014    Tonsillectomy    TOTAL VAGINAL HYSTERECTOMY  2014    Vaginal Hysterectomy    TUBAL LIGATION  2014    Tubal Ligation     Social History     Tobacco Use    Smoking status: Former     Current packs/day: 0.00     Average packs/day: 0.3 packs/day for 4.0 years (1.0 ttl pk-yrs)     Types: Cigarettes     Start date:      Quit date:      Years since quittin.1     Passive exposure: Past    Smokeless tobacco: Never   Vaping Use    Vaping status: Never Used   Substance Use Topics    Alcohol use: Yes     Alcohol/week: 2.0 standard drinks of alcohol     Types: 2 Glasses of wine per week     Comment: twice a month    Drug use: Never         Review of Systems   Constitutional:  Negative for chills, fever and weight loss.   HENT:  Positive for postnasal drip. Negative for ear pain, rhinorrhea and sore throat.    Respiratory:  Positive for cough. Negative for hemoptysis, shortness of breath and wheezing.    Cardiovascular:  Negative for chest pain.   Gastrointestinal:  Negative for heartburn.   Musculoskeletal:  Negative for myalgias.   Skin:  Negative for rash.   Allergic/Immunologic: Negative for environmental allergies.   Neurological:  Positive for headaches.   All other systems  "reviewed and are negative.      Objective     Vital Signs  /88 (BP Location: Left arm, Patient Position: Sitting, BP Cuff Size: Adult long)   Pulse 95   Temp 37.4 °C (99.3 °F) (Oral)   Ht 1.549 m (5' 1\")   Wt 84.2 kg (185 lb 9.6 oz)   SpO2 97%   BMI 35.07 kg/m²    All vitals have been reviewed and are stable.     Physical Exam  Vitals reviewed.   Constitutional:       General: She is awake.      Appearance: Normal appearance. She is well-developed.   HENT:      Head: Normocephalic and atraumatic.      Right Ear: Tympanic membrane and ear canal normal.      Left Ear: Tympanic membrane and ear canal normal.      Nose: Mucosal edema and congestion present.      Right Turbinates: Swollen.      Left Turbinates: Swollen.      Mouth/Throat:      Lips: Pink.      Mouth: Mucous membranes are moist.      Pharynx: Uvula midline. Posterior oropharyngeal erythema and postnasal drip present.   Cardiovascular:      Rate and Rhythm: Normal rate and regular rhythm.   Pulmonary:      Effort: Pulmonary effort is normal.      Breath sounds: Decreased air movement present. Decreased breath sounds and wheezing present. No rhonchi or rales.      Comments: Deep, wet cough noted on exam.   Musculoskeletal:      Cervical back: Full passive range of motion without pain.      Right lower leg: No edema.      Left lower leg: No edema.   Skin:     General: Skin is warm and dry.      Findings: No lesion or rash.   Neurological:      General: No focal deficit present.      Mental Status: She is alert and oriented to person, place, and time.      Cranial Nerves: No facial asymmetry.      Motor: Motor function is intact.      Gait: Gait is intact.   Psychiatric:         Attention and Perception: Attention normal.         Mood and Affect: Mood and affect normal.       Assessment/Plan       Problem List Items Addressed This Visit    None  Visit Diagnoses       Lower respiratory infection    -  Primary    Relevant Medications    predniSONE " (Deltasone) 10 mg tablet    benzonatate (Tessalon) 200 mg capsule    doxycycline (Monodox) 100 mg capsule          Low dose prednisone Rx - has taken before without issues with her DM2 for sarcoidosis  Doxycycline Rx - relatives with similar Sx did not respond to Augmentin or Zpak  Tessalon given for cough   Increase rest and fluids  ER if worsening symptoms such as chest pain, shortness of breath, coughing up blood, fever that cannot be controlled with Tylenol, severe headache, or other worsening/concerning symptoms     Red flag symptoms reviewed with patient and all questions answered. Patient or parent/guardian verbalized understanding and agreement with care plan as above. All in office testing reviewed with patient. If symptoms worsen or do not improve, patient is to follow up with PCP or report to the ER.

## 2025-02-21 ENCOUNTER — APPOINTMENT (OUTPATIENT)
Dept: RADIOLOGY | Facility: HOSPITAL | Age: 65
End: 2025-02-21
Payer: MEDICARE

## 2025-02-21 ENCOUNTER — HOSPITAL ENCOUNTER (EMERGENCY)
Facility: HOSPITAL | Age: 65
Discharge: HOME | End: 2025-02-21
Payer: MEDICARE

## 2025-02-21 VITALS
HEART RATE: 57 BPM | DIASTOLIC BLOOD PRESSURE: 85 MMHG | BODY MASS INDEX: 33.99 KG/M2 | RESPIRATION RATE: 18 BRPM | SYSTOLIC BLOOD PRESSURE: 152 MMHG | WEIGHT: 180 LBS | HEIGHT: 61 IN | TEMPERATURE: 98.4 F | OXYGEN SATURATION: 99 %

## 2025-02-21 DIAGNOSIS — J40 BRONCHITIS: Primary | ICD-10-CM

## 2025-02-21 LAB
FLUAV RNA RESP QL NAA+PROBE: NOT DETECTED
FLUBV RNA RESP QL NAA+PROBE: NOT DETECTED
RSV RNA RESP QL NAA+PROBE: NOT DETECTED
SARS-COV-2 RNA RESP QL NAA+PROBE: NOT DETECTED

## 2025-02-21 PROCEDURE — 71046 X-RAY EXAM CHEST 2 VIEWS: CPT | Performed by: RADIOLOGY

## 2025-02-21 PROCEDURE — 71046 X-RAY EXAM CHEST 2 VIEWS: CPT

## 2025-02-21 PROCEDURE — 87637 SARSCOV2&INF A&B&RSV AMP PRB: CPT | Performed by: STUDENT IN AN ORGANIZED HEALTH CARE EDUCATION/TRAINING PROGRAM

## 2025-02-21 PROCEDURE — 99284 EMERGENCY DEPT VISIT MOD MDM: CPT | Mod: 25

## 2025-02-21 RX ORDER — BENZONATATE 200 MG/1
200 CAPSULE ORAL 3 TIMES DAILY PRN
Qty: 15 CAPSULE | Refills: 0 | Status: SHIPPED | OUTPATIENT
Start: 2025-02-21 | End: 2025-02-26

## 2025-02-21 RX ORDER — PREDNISONE 20 MG/1
40 TABLET ORAL DAILY
Qty: 10 TABLET | Refills: 0 | Status: SHIPPED | OUTPATIENT
Start: 2025-02-21 | End: 2025-02-26

## 2025-02-21 ASSESSMENT — PAIN DESCRIPTION - LOCATION
LOCATION_2: THROAT
LOCATION: HEAD

## 2025-02-21 ASSESSMENT — PAIN - FUNCTIONAL ASSESSMENT: PAIN_FUNCTIONAL_ASSESSMENT: 0-10

## 2025-02-21 ASSESSMENT — PAIN DESCRIPTION - PAIN TYPE: TYPE: ACUTE PAIN

## 2025-02-21 ASSESSMENT — PAIN SCALES - GENERAL
PAINLEVEL_OUTOF10: 8
PAINLEVEL_OUTOF10: 6

## 2025-02-21 NOTE — ED TRIAGE NOTES
C/o flu like symptoms, cough, congestion, hoarse voice, throat pain, headache, nausea and vomiting approx 1 week. Seen at urgent care Monday and diagnosed with pneumonia- pt received steroids and antibiotics but not improving

## 2025-02-22 NOTE — ED PROVIDER NOTES
HPI   Chief Complaint   Patient presents with    Flu Symptoms       This is a 64-year-old -American female, with a past medical history of sarcoidosis that is presenting to the emergency room with complaints of flulike symptoms.  The patient's symptoms started approximately 2 weeks ago.  She states that her grandchildren were sick with similar symptoms.  The patient has been experiencing productive cough with yellow sputum.  She has been having a headache, body aches, and general malaise.  The patient was seen at the urgent care on Monday.  She was given a prescription for doxycycline and a Medrol Dosepak.  The patient states she is not having any improvement of her symptoms.  She is not having any chest pain, dizziness, palpitations, paresthesias, or focal weakness.  Denies any abdominal pain.  Not having any nausea or vomiting.  Denies any alteration in her urine or bowel function.      History provided by:  Patient   used: No            Patient History   Past Medical History:   Diagnosis Date    Abnormal finding of blood chemistry, unspecified 04/14/2014    Abnormal blood chemistry    Acute bronchospasm 04/18/2016    Bronchospasm    Acute candidiasis of vulva and vagina 07/21/2013    Vaginitis due to Candida albicans    Acute embolism and thrombosis of unspecified vein 02/18/2014    Venous thrombosis    Acute vaginitis 02/11/2016    Bacterial vaginosis    Adjustment disorder with mixed anxiety and depressed mood 04/06/2023    Age-related nuclear cataract, bilateral 10/18/2022    Nuclear sclerosis of both eyes    Anesthesia of skin 03/16/2018    Numbness and tingling in both hands    Bitten or stung by nonvenomous insect and other nonvenomous arthropods, initial encounter 09/01/2016    Bug bite, initial encounter    Carpal tunnel syndrome 04/06/2023    Cervical lymphadenitis 04/06/2023    Chronic pain of left ankle 04/06/2023    Diverticulitis of large intestine without perforation or  abscess without bleeding 07/21/2013    Diverticulitis of colon    Dry eye syndrome of unspecified lacrimal gland 10/18/2022    Dry eye syndrome    Encounter for follow-up examination after completed treatment for conditions other than malignant neoplasm 11/18/2015    Hospital discharge follow-up    Encounter for immunization 02/20/2016    Immunization due    Encounter for other preprocedural examination 10/09/2015    Pre-operative clearance    Encounter for screening for infections with a predominantly sexual mode of transmission 03/16/2018    Screening for STD (sexually transmitted disease)    Encounter for screening for infections with a predominantly sexual mode of transmission 02/28/2018    Screening for STD (sexually transmitted disease)    Encounter for screening for infections with a predominantly sexual mode of transmission 02/28/2018    Screening for STD (sexually transmitted disease)    Encounter for screening for malignant neoplasm of colon 11/23/2020    Encounter for screening colonoscopy    Excessive and frequent menstruation with irregular cycle 07/21/2013    Metrorrhagia    Facial cellulitis 05/17/2023    Follicular disorder, unspecified 04/02/2021    Hemorrhage of anus and rectum 05/09/2014    Rectal hemorrhage    Hot flashes, menopausal 04/06/2023    Hyperlipidemia, unspecified 07/21/2013    Hyperlipidemia    Impetigo, unspecified 04/02/2021    Incarcerated umbilical hernia 04/06/2023    Surgery 4/1/23. Patient doing well. Dr. Tawanda Munoz.     Inflamed seborrheic keratosis 04/02/2021    Lateral epicondylitis, unspecified elbow 03/16/2018    Tennis elbow    Lateral epicondylitis, unspecified elbow 02/28/2018    Tennis elbow    Lateral epicondylitis, unspecified elbow 02/28/2018    Tennis elbow    Left sided colitis without complications (Multi) 07/21/2013    Ulcerative colitis, left sided    Lumbosacral spondylosis 04/06/2023    Myopia, bilateral 10/18/2022    Myopia of both eyes with astigmatism  and presbyopia    Nevus of left conjunctiva 04/06/2023    Obstructive sleep apnea (adult) (pediatric) 07/21/2013    Obstructive sleep apnea    Other chest pain 05/08/2019    Chest tightness    Other conditions influencing health status 12/28/2020    Colonoscopy planned    Other conditions influencing health status 05/08/2019    History of cough    Other enthesopathies, not elsewhere classified 05/06/2014    Tendinitis of right shoulder    Other hypersomnia 11/08/2017    Excessive daytime sleepiness    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    Protein S deficiency    Other specified abnormal findings of blood chemistry 10/07/2016    Elevated serum creatinine    Other symptoms and signs involving the genitourinary system 11/23/2020    Abnormal urogenital discharge    Other symptoms and signs involving the nervous system 04/13/2017    Suspected sleep apnea    Personal history of diseases of the blood and blood-forming organs and certain disorders involving the immune mechanism 10/18/2022    History of sarcoidosis    Personal history of diseases of the skin and subcutaneous tissue     History of dermatitis    Personal history of other diseases of the circulatory system 07/10/2020    History of congestive heart failure    Personal history of other diseases of the female genital tract 10/26/2020    History of abnormal uterine bleeding    Personal history of other diseases of the musculoskeletal system and connective tissue 01/16/2018    History of lateral epicondylitis    Personal history of other diseases of the respiratory system 07/21/2013    History of acute bronchitis    Personal history of other diseases of the respiratory system 04/14/2014    Personal history of sinusitis    Personal history of other diseases of urinary system 02/02/2022    History of hematuria    Personal history of other diseases of urinary system 11/01/2021    History of hematuria    Personal history of other infectious and parasitic diseases  04/14/2014    History of trichomoniasis    Personal history of other infectious and parasitic diseases     History of herpes simplex infection    Personal history of other specified conditions 07/21/2020    History of chest pain    Personal history of other specified conditions 01/18/2017    History of dysuria    Personal history of other specified conditions 11/23/2020    History of diarrhea    Personal history of other specified conditions 07/14/2022    History of itching    Personal history of other specified conditions 10/14/2020    History of dyspnea    Personal history of other specified conditions 06/18/2016    History of excessive sweating    Personal history of other specified conditions 07/26/2016    History of diarrhea    Personal history of other specified conditions 06/18/2016    History of nausea    Personal history of peptic ulcer disease     History of peptic ulcer    Personal history of pulmonary embolism 08/21/2020    History of pulmonary embolism    Personal history of urinary (tract) infections 11/05/2015    History of urinary tract infection    Piriformis syndrome of right side 04/06/2023    Postnasal drip 04/14/2014    Post-nasal drainage    Primary osteoarthritis of first carpometacarpal joint of left hand 04/06/2023    Rash and other nonspecific skin eruption 07/14/2022    Rash    Right upper quadrant abdominal tenderness 05/22/2017    Abdominal tenderness, right upper quadrant    Spondylolisthesis, acquired 04/06/2023    Type 2 diabetes mellitus 11/21/2022    Formatting of this note might be different from the original. 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    Ulcerative blepharitis    Ulnar neuropathy at elbow of right upper extremity 04/06/2023     Unspecified exophthalmos 07/21/2013    Exophthalmos    Unspecified exophthalmos 10/18/2022    Ocular proptosis    Urinary tract infection, site not specified 04/14/2014    Acute UTI    Vascular disorder of intestine, unspecified 07/21/2013    Ischemic colitis     Past Surgical History:   Procedure Laterality Date    CARDIAC CATHETERIZATION N/A 8/19/2024    Procedure: Left Heart Cath, With LV;  Surgeon: Paul He MD;  Location: [facility];  Service: Cardiovascular;  Laterality: N/A;    CARDIAC CATHETERIZATION N/A 8/19/2024    Procedure: PCI ETHAN Stent- Coronary;  Surgeon: Paul He MD;  Location: [facility];  Service: Cardiovascular;  Laterality: N/A;    CARDIAC CATHETERIZATION N/A 8/19/2024    Procedure: IVUS - Coronary;  Surgeon: Paul He MD;  Location: [facility];  Service: Cardiovascular;  Laterality: N/A;    CT ABDOMEN PELVIS ANGIOGRAM W AND/OR WO IV CONTRAST  11/12/2015    CT ABDOMEN PELVIS ANGIOGRAM W AND/OR WO IV CONTRAST 11/12/2015 [facility] CLINICAL LEGAC[facility]    CT ABDOMEN PELVIS ANGIOGRAM W AND/OR WO IV CONTRAST  5/8/2022    CT ABDOMEN PELVIS ANGIOGRAM W AND/OR WO IV CONTRAST 5/8/2022 [facility]GAC[facility]    CT ABDOMEN PELVIS ANGIOGRAM W AND/OR WO IV CONTRAST  4/1/2023    CT ABDOMEN PELVIS ANGIOGRAM W AND/OR WO IV CONTRAST [facility] CT    HYSTEROSCOPY  05/09/2014    Hysteroscopy With Endometrial Ablation    OTHER SURGICAL HISTORY  04/07/2014    Left Hemicolectomy    TONSILLECTOMY  04/07/2014    Tonsillectomy    TOTAL VAGINAL HYSTERECTOMY  09/11/2014    Vaginal Hysterectomy    TUBAL LIGATION  04/07/2014    Tubal Ligation     Family History   Problem Relation Name Age of Onset    Heart disease Mother      Kidney failure Father      Cirrhosis Sister      Breast cancer Neg Hx       Social History     Tobacco Use    Smoking status: Former     Current packs/day: 0.00     Average packs/day: 0.3 packs/day for 4.0 years (1.0 ttl pk-yrs)     Types: Cigarettes     Start date: 1990      Quit date:      Years since quittin.1     Passive exposure: Past    Smokeless tobacco: Never   Vaping Use    Vaping status: Never Used   Substance Use Topics    Alcohol use: Yes     Alcohol/week: 2.0 standard drinks of alcohol     Types: 2 Glasses of wine per week     Comment: twice a month    Drug use: Never       Physical Exam   ED Triage Vitals [25 1759]   Temperature Heart Rate Respirations BP   37.3 °C (99.1 °F) 63 20 162/82      Pulse Ox Temp Source Heart Rate Source Patient Position   98 % Tympanic Monitor Sitting      BP Location FiO2 (%)     Left arm --       Physical Exam  Vitals and nursing note reviewed.   Constitutional:       Appearance: Normal appearance.   HENT:      Head: Normocephalic and atraumatic.      Right Ear: Tympanic membrane and external ear normal.      Left Ear: Tympanic membrane and external ear normal.      Nose: Nose normal.      Mouth/Throat:      Mouth: Mucous membranes are moist.      Pharynx: Oropharynx is clear.   Eyes:      Extraocular Movements: Extraocular movements intact.      Conjunctiva/sclera: Conjunctivae normal.      Pupils: Pupils are equal, round, and reactive to light.   Cardiovascular:      Rate and Rhythm: Normal rate and regular rhythm.      Pulses: Normal pulses.   Pulmonary:      Effort: Pulmonary effort is normal.      Breath sounds: Normal breath sounds.   Genitourinary:     Comments: No CVA tenderness or pubic pain.  Musculoskeletal:         General: Normal range of motion.   Skin:     General: Skin is warm and dry.      Capillary Refill: Capillary refill takes less than 2 seconds.   Neurological:      General: No focal deficit present.      Mental Status: She is alert and oriented to person, place, and time.   Psychiatric:         Mood and Affect: Mood normal.           ED Course & MDM   Diagnoses as of 25   Bronchitis                 No data recorded     Boris Coma Scale Score: 15 (25 1801 : Gunjan Sidhu RN)                            Medical Decision Making  Patient was seen and evaluated by the nurse practitioner, Georgette Gilbert.  The patient is presenting to the emergency room with complaints of flulike symptoms.  Differential diagnosis includes influenza, COVID, RSV, bronchitis, pneumonia, or other acute process.  Viral swabs were obtained and were negative.  An x-ray of the chest was performed and showed no acute cardiopulmonary process.  The patient was not experiencing any respiratory distress.  Vital signs were stable.  At this time, we believe the patient is most likely suffering from acute bronchitis.  She was added an additional course of steroids as well as Tessalon Perles.  She is to continue taking her antibiotics as directed.  The patient is to follow up with their primary care physician in the next 2-3 days.  The patient is to return to the ED worse in any way.  The patient was discharged in stable condition with computer discharge instructions given. Patient was agreeable with discharge planning.        Procedure  Procedures     CEDRIC Crawford-THIERRY  02/21/25 [id number]

## 2025-03-04 ENCOUNTER — APPOINTMENT (OUTPATIENT)
Dept: CARDIOLOGY | Facility: CLINIC | Age: 65
End: 2025-03-04
Payer: MEDICARE

## 2025-03-27 ENCOUNTER — TELEPHONE (OUTPATIENT)
Dept: CARDIOLOGY | Facility: HOSPITAL | Age: 65
End: 2025-03-27

## 2025-03-27 ENCOUNTER — TELEMEDICINE (OUTPATIENT)
Dept: PRIMARY CARE | Facility: CLINIC | Age: 65
End: 2025-03-27
Payer: COMMERCIAL

## 2025-03-27 DIAGNOSIS — D68.61 ANTIPHOSPHOLIPID ANTIBODY SYNDROME (MULTI): ICD-10-CM

## 2025-03-27 DIAGNOSIS — Z86.711 HISTORY OF PULMONARY EMBOLUS (PE): ICD-10-CM

## 2025-03-27 DIAGNOSIS — U07.1 COVID-19: Primary | ICD-10-CM

## 2025-03-27 DIAGNOSIS — Z79.01 CHRONIC ANTICOAGULATION: ICD-10-CM

## 2025-03-27 PROCEDURE — 99214 OFFICE O/P EST MOD 30 MIN: CPT | Performed by: INTERNAL MEDICINE

## 2025-03-27 RX ORDER — NIRMATRELVIR AND RITONAVIR 300-100 MG
3 KIT ORAL 2 TIMES DAILY
Qty: 30 TABLET | Refills: 0 | Status: SHIPPED | OUTPATIENT
Start: 2025-03-27 | End: 2025-04-01

## 2025-03-27 NOTE — TELEPHONE ENCOUNTER
RN returned patient call patient reports she tested positive covid today, she is requesting to be off eliquis to take paxlovid RN to discuss with physician.

## 2025-03-27 NOTE — TELEPHONE ENCOUNTER
RN called and spoke with patient notified patient that, Dr. He will not prescribe paxlovid for her positive COVID test. RN explained to patient that the blood thinner along with paxlovid can increase risk of bleeding. If she chooses to take the medication her  blood thinner will need to be decreased, patient  has a history of recurrent blood clots and that would put her at risk for developing more clots. Patient verbalized understanding and will call back later today to update her plan moving forward. Patient will also schedule missed follow up at that time.

## 2025-03-27 NOTE — PROGRESS NOTES
Virtual or Telephone Consent    An interactive audio and video telecommunication system which permits real time communications between the patient (at the originating site) and provider (at the distant site) was utilized to provide this telehealth service.   Verbal consent was requested and obtained from Marni Lugo on this date, 03/27/25 for a telehealth visit and the patient's location was confirmed at the time of the visit.    Subjective   Patient ID: Marni Lugo is a 64 y.o. female who presents for No chief complaint on file..    HPI   Patient presented today to urgent care for chief complaint of cough, congestion, body aches, sore throat and chills. Patient states symptoms have been present for 3 days. Patient denies any shortness of breath or wheezing. Patient denies any abdominal pain nausea vomiting or diarrhea. Patient has been taking Robitussin for symptoms with some relief. Patient states that she is on Eliquis for heart history and has stage III kidney disease. She tested Covid positive , and checked with Cardiology for Paxlovid     Review of Systems   Constitutional: Positive for chills, diaphoresis, fatigue and fever. Negative for activity change and appetite change.   HENT: Positive for congestion, rhinorrhea and sore throat. Negative for ear discharge, ear pain and postnasal drip.   Eyes: Negative for pain, discharge, redness and itching.   Respiratory: Positive for cough. Negative for chest tightness, shortness of breath and wheezing.   Gastrointestinal: Negative for abdominal pain, constipation, nausea and vomiting.   Skin: Negative for color change, pallor, rash and wound.   Allergic/Immunologic: Negative for environmental allergies and food allergies.   Neurological: Positive for headaches.   Hematological: Negative for adenopathy.     Review of Systems  See HPI      Physical Exam  This is Tele video visit    Assessment/Plan        Covid 19 Positive / Viral illness  -Patient advised to  utilize nasal saline/rinses, humidified air, warm salt water gargles, throat lozenges/sprays, increased fluids and rest as well as tylenol for pain and discomfort/symptom management.    Recommend to take Covid booster at pharmacy  Contact precautions  Paxlovid for 5 days , but reduce dose of Eliquis to 2.5 mg bid for 5 days , due to increased risk of bleeding   She will hold Plavix , and crestor for 5 days and she is not taking sildenafil ( interaction with paxlovid ) explained , she   Acknowledged understanding

## 2025-03-31 ENCOUNTER — TELEPHONE (OUTPATIENT)
Dept: CARDIOLOGY | Facility: HOSPITAL | Age: 65
End: 2025-03-31
Payer: MEDICARE

## 2025-03-31 NOTE — TELEPHONE ENCOUNTER
RN called and spoke with patient, notified that since she just had covid she would have to reschedule her appointment, new appointment 4/8/25 1200

## 2025-04-01 ENCOUNTER — APPOINTMENT (OUTPATIENT)
Dept: PRIMARY CARE | Facility: CLINIC | Age: 65
End: 2025-04-01
Payer: MEDICARE

## 2025-04-01 ENCOUNTER — APPOINTMENT (OUTPATIENT)
Dept: CARDIOLOGY | Facility: CLINIC | Age: 65
End: 2025-04-01
Payer: MEDICARE

## 2025-04-01 VITALS
BODY MASS INDEX: 31.51 KG/M2 | OXYGEN SATURATION: 96 % | WEIGHT: 184.6 LBS | TEMPERATURE: 97.3 F | HEART RATE: 68 BPM | DIASTOLIC BLOOD PRESSURE: 67 MMHG | RESPIRATION RATE: 16 BRPM | HEIGHT: 64 IN | SYSTOLIC BLOOD PRESSURE: 122 MMHG

## 2025-04-01 DIAGNOSIS — U07.1 COVID-19: Primary | ICD-10-CM

## 2025-04-01 LAB — POC SARS-COV-2 AG BINAX: ABNORMAL

## 2025-04-01 PROCEDURE — 1036F TOBACCO NON-USER: CPT | Performed by: INTERNAL MEDICINE

## 2025-04-01 PROCEDURE — 99213 OFFICE O/P EST LOW 20 MIN: CPT | Performed by: INTERNAL MEDICINE

## 2025-04-01 PROCEDURE — 3078F DIAST BP <80 MM HG: CPT | Performed by: INTERNAL MEDICINE

## 2025-04-01 PROCEDURE — 87811 SARS-COV-2 COVID19 W/OPTIC: CPT | Performed by: INTERNAL MEDICINE

## 2025-04-01 PROCEDURE — 3074F SYST BP LT 130 MM HG: CPT | Performed by: INTERNAL MEDICINE

## 2025-04-01 PROCEDURE — 3008F BODY MASS INDEX DOCD: CPT | Performed by: INTERNAL MEDICINE

## 2025-04-01 NOTE — PROGRESS NOTES
Subjective   Patient ID: Marni Lugo is a 64 y.o. female who presents for Follow-up (Post covid ).    HPI     She has finished Paxlovid for Covid infection , wants to confirm that she does not have covid any more , she is seeing an autistic person.  She is feeling much better now.   Interval history :  Patient presented  to urgent care for chief complaint of cough, congestion, body aches, sore throat and chills. Patient states symptoms have been present for 3 days. Patient denies any shortness of breath or wheezing. Patient denies any abdominal pain nausea vomiting or diarrhea. Patient has been taking Robitussin for symptoms with some relief. Patient states that she is on Eliquis for heart history and has stage III kidney disease. She tested Covid positive , and checked with Cardiology for Paxlovid         Review of Systems  See HPI      Physical Exam  Constitutional:       Appearance: Normal appearance.   HENT:      Nose: Nose normal.      Mouth/Throat:      Mouth: Mucous membranes are moist.      Pharynx: Oropharynx is clear. No oropharyngeal exudate or posterior oropharyngeal erythema.   Eyes:      Conjunctiva/sclera: Conjunctivae normal.   Cardiovascular:      Rate and Rhythm: Normal rate and regular rhythm.      Heart sounds: Normal heart sounds.   Pulmonary:      Effort: Pulmonary effort is normal.      Breath sounds: Normal breath sounds. No wheezing.           Assessment/Plan     Repeat Covid Ag swab test : weakly positive   Covid 19 Positive / Viral illness  -Patient advised to utilize nasal saline/rinses, humidified air, warm salt water gargles, throat lozenges/sprays, increased fluids and rest as well as tylenol for pain and discomfort/symptom management.    Recommend to take Covid booster at pharmacy  Contact precautions  Paxlovid for 5 days , she has just finished course.  Hydration , MV and Vit C, tylenol  Reassurance

## 2025-04-07 ENCOUNTER — TELEPHONE (OUTPATIENT)
Dept: CARDIOLOGY | Facility: HOSPITAL | Age: 65
End: 2025-04-07
Payer: MEDICARE

## 2025-04-07 NOTE — TELEPHONE ENCOUNTER
RN called and spoke with patient, home medications reviewed, patient then stated she is getting another COVID test today and is still feeling ill. Will call to reschedule her appointment when she is feeling better.

## 2025-04-08 ENCOUNTER — APPOINTMENT (OUTPATIENT)
Dept: CARDIOLOGY | Facility: CLINIC | Age: 65
End: 2025-04-08
Payer: MEDICARE

## 2025-04-08 ENCOUNTER — APPOINTMENT (OUTPATIENT)
Dept: PRIMARY CARE | Facility: CLINIC | Age: 65
End: 2025-04-08
Payer: MEDICARE

## 2025-05-04 ENCOUNTER — HOSPITAL ENCOUNTER (EMERGENCY)
Facility: HOSPITAL | Age: 65
Discharge: HOME | End: 2025-05-04
Payer: MEDICARE

## 2025-05-04 ENCOUNTER — APPOINTMENT (OUTPATIENT)
Dept: RADIOLOGY | Facility: HOSPITAL | Age: 65
End: 2025-05-04
Payer: MEDICARE

## 2025-05-04 ENCOUNTER — OFFICE VISIT (OUTPATIENT)
Dept: URGENT CARE | Age: 65
End: 2025-05-04
Payer: MEDICARE

## 2025-05-04 VITALS
HEART RATE: 75 BPM | OXYGEN SATURATION: 98 % | RESPIRATION RATE: 16 BRPM | DIASTOLIC BLOOD PRESSURE: 87 MMHG | SYSTOLIC BLOOD PRESSURE: 162 MMHG

## 2025-05-04 VITALS
DIASTOLIC BLOOD PRESSURE: 99 MMHG | HEIGHT: 61 IN | HEART RATE: 73 BPM | SYSTOLIC BLOOD PRESSURE: 168 MMHG | WEIGHT: 182 LBS | BODY MASS INDEX: 34.36 KG/M2 | TEMPERATURE: 97 F | RESPIRATION RATE: 16 BRPM | OXYGEN SATURATION: 98 %

## 2025-05-04 DIAGNOSIS — M79.604 PAIN OF RIGHT LOWER EXTREMITY: Primary | ICD-10-CM

## 2025-05-04 DIAGNOSIS — I82.441 ACUTE DEEP VEIN THROMBOSIS (DVT) OF RIGHT TIBIAL VEIN (MULTI): Primary | ICD-10-CM

## 2025-05-04 PROCEDURE — 3077F SYST BP >= 140 MM HG: CPT | Performed by: NURSE PRACTITIONER

## 2025-05-04 PROCEDURE — 99499 UNLISTED E&M SERVICE: CPT | Performed by: NURSE PRACTITIONER

## 2025-05-04 PROCEDURE — 3079F DIAST BP 80-89 MM HG: CPT | Performed by: NURSE PRACTITIONER

## 2025-05-04 PROCEDURE — 99284 EMERGENCY DEPT VISIT MOD MDM: CPT | Mod: 25

## 2025-05-04 PROCEDURE — 2500000001 HC RX 250 WO HCPCS SELF ADMINISTERED DRUGS (ALT 637 FOR MEDICARE OP): Performed by: PHYSICIAN ASSISTANT

## 2025-05-04 PROCEDURE — 93971 EXTREMITY STUDY: CPT

## 2025-05-04 PROCEDURE — 93971 EXTREMITY STUDY: CPT | Performed by: RADIOLOGY

## 2025-05-04 RX ORDER — ACETAMINOPHEN 325 MG/1
975 TABLET ORAL ONCE
Status: COMPLETED | OUTPATIENT
Start: 2025-05-04 | End: 2025-05-04

## 2025-05-04 RX ORDER — TRAMADOL HYDROCHLORIDE 50 MG/1
50 TABLET ORAL EVERY 6 HOURS
Qty: 12 TABLET | Refills: 0 | Status: SHIPPED | OUTPATIENT
Start: 2025-05-04 | End: 2025-05-07

## 2025-05-04 RX ADMIN — ACETAMINOPHEN 975 MG: 325 TABLET ORAL at 13:37

## 2025-05-04 ASSESSMENT — PAIN DESCRIPTION - PAIN TYPE: TYPE: ACUTE PAIN

## 2025-05-04 ASSESSMENT — COLUMBIA-SUICIDE SEVERITY RATING SCALE - C-SSRS
2. HAVE YOU ACTUALLY HAD ANY THOUGHTS OF KILLING YOURSELF?: NO
1. IN THE PAST MONTH, HAVE YOU WISHED YOU WERE DEAD OR WISHED YOU COULD GO TO SLEEP AND NOT WAKE UP?: NO
6. HAVE YOU EVER DONE ANYTHING, STARTED TO DO ANYTHING, OR PREPARED TO DO ANYTHING TO END YOUR LIFE?: NO

## 2025-05-04 ASSESSMENT — PAIN - FUNCTIONAL ASSESSMENT
PAIN_FUNCTIONAL_ASSESSMENT: 0-10
PAIN_FUNCTIONAL_ASSESSMENT: 0-10

## 2025-05-04 ASSESSMENT — LIFESTYLE VARIABLES
EVER HAD A DRINK FIRST THING IN THE MORNING TO STEADY YOUR NERVES TO GET RID OF A HANGOVER: NO
HAVE YOU EVER FELT YOU SHOULD CUT DOWN ON YOUR DRINKING: NO
HAVE PEOPLE ANNOYED YOU BY CRITICIZING YOUR DRINKING: NO
EVER FELT BAD OR GUILTY ABOUT YOUR DRINKING: NO
TOTAL SCORE: 0

## 2025-05-04 ASSESSMENT — PAIN DESCRIPTION - ORIENTATION: ORIENTATION: RIGHT

## 2025-05-04 ASSESSMENT — PAIN SCALES - GENERAL
PAINLEVEL_OUTOF10: 8
PAINLEVEL_OUTOF10: 10 - WORST POSSIBLE PAIN

## 2025-05-04 ASSESSMENT — PAIN DESCRIPTION - LOCATION: LOCATION: LEG

## 2025-05-04 NOTE — ED TRIAGE NOTES
Pt. Arrived to the ED via Private car sent from urgent care for c/o right lower leg pain in swelling in the posterior side. Per pt. She went to bed fine and woke up this morning with the pain and swelling. Denies any injury or trauma to the area. Denies any long car rides or flights

## 2025-05-04 NOTE — ED PROVIDER NOTES
HPI   Chief Complaint   Patient presents with    Leg Pain    Leg Swelling       History of present illness: 64-year-old female with history of rheumatoid arthritis and DVT presents with increasing pain and swelling on the right leg today.  Denies any specific injury.  She was seen in urgent care and told to go to the emergency department.  Denies paresthesias weakness loss of function.  Denies chest pain shortness of breath lightheadedness dizziness.  Patient has history of DVT and takes Eliquis.  Patient states she missed a few doses last week.    Review of systems: Constitutional, eye, ENT, cardiovascular, respiratory, gastrointestinal, genitourinary, neurologic, musculoskeletal, dermatologic, hematologic, endocrine systems were evaluated and were negative unless otherwise specified in history of present illness.    Medications: Reviewed and per nursing note.    Family history: Denies relevant medical conditions.    Social history: Denies tobacco, alcohol, drug use.      Physical exam:    Appearance: Well-developed, well-nourished, nontoxic-appearing, alert and oriented x3. Talking in complete sentences.    HEENT:  Head normocephalic atraumatic,    NECK:  Nml Inspection    Respiratory: Clear to auscultation    Cardiovascular: Regular rate and rhythm. Capillary refill less than 3 seconds on all extremities.    Neuro:  Oriented x 3, Speech Clear, cranial nerves grossly intact. Normal sensation to light touch in all 4 extremities. Deep tendon reflexes 2+ symmetrically in upper and lower extremities.    Musculoskeletal: Right calf tenderness.  No knee tenderness.  Normal anterior posterior drawer.  No varus or valgus instability.  Normal Tidwell test.  Patient spontaneously moves all 4 extremities with 5/5 muscle strength.    Skin:  No cyanosis, clubbing, edema, open wounds, or rashes.            Patient History   Medical History[1]  Surgical History[2]  Family History[3]  Social History[4]    Physical Exam   ED  Triage Vitals   Temperature Heart Rate Respirations BP   05/04/25 1215 05/04/25 1215 05/04/25 1215 05/04/25 1215   36.1 °C (97 °F) 75 16 180/85      Pulse Ox Temp src Heart Rate Source Patient Position   05/04/25 1215 -- 05/04/25 1607 --   98 %  Monitor       BP Location FiO2 (%)     -- --             Physical Exam      ED Course & MDM   Diagnoses as of 05/04/25 1611   Acute deep vein thrombosis (DVT) of right tibial vein (Multi)                 No data recorded     Brohard Coma Scale Score: 15 (05/04/25 1219 : Samantha Ellison, VISH)                           Medical Decision Making  Vascular US lower extremity venous duplex right   Final Result    Occlusive thrombus involving proximal segment of the right posterior    tibial vein. No sonographic evidence for deep venous thrombosis in    the remainder of the visualized portions of the  right lower    extremity deep venous system.          SUPPLEMENTAL INFORMATION:    Notifi message was left for PRISCA MCMULLEN regarding this exam by Dr. Velázquez on 5/4/2025 at approximately 15:33 hours.          Signed by: Jamin Velázquez 5/4/2025 3:33 PM    Dictation workstation:   [id number]         Patient presents with extremity pain.  Differential diagnosis of DVT, fracture, cellulitis, venous stasis, contusion, dislocation, sprain, strain, vascular compromise, compartment syndrome.  Examination shows tenderness right calf with normal neurovascular exam and compartments with full ROM.  No evidence of neurovascular injury or compartment syndrome.    Venous duplex ultrasound right lower ordered.  Given Tylenol for pain control.  Pain improved.  Venous duplex ultrasound shows occlusive thrombus of the proximal posterior tibial vein.  There is no thigh vein deep vein thrombosis.  Patient has distal DVT.  She is currently on anticoagulants and missed some doses.  Patient is encouraged to take Eliquis as prescribed daily.  Patient has no cardiopulmonary symptoms making clinically significant  PE unlikely.    Patient will be discharged to home with prescription for tramadol as needed for pain control.  Patient is educated in signs and symptoms of worsening symptoms and reasons to come back to the emergency department.  Will need to follow up with primary care provider.  Patient does not report social determinants of health impacting ability to obtain care that is needed.  Patient agrees with plan.    This is a transcription.  Text was reviewed for errors, but some transcription errors may remain.  Please call for any questions.              Procedure  Procedures       [1]   Past Medical History:  Diagnosis Date    Abnormal finding of blood chemistry, unspecified 04/14/2014    Abnormal blood chemistry    Acute bronchospasm 04/18/2016    Bronchospasm    Acute candidiasis of vulva and vagina 07/21/2013    Vaginitis due to Candida albicans    Acute embolism and thrombosis of unspecified vein 02/18/2014    Venous thrombosis    Acute vaginitis 02/11/2016    Bacterial vaginosis    Adjustment disorder with mixed anxiety and depressed mood 04/06/2023    Age-related nuclear cataract, bilateral 10/18/2022    Nuclear sclerosis of both eyes    Anesthesia of skin 03/16/2018    Numbness and tingling in both hands    Bitten or stung by nonvenomous insect and other nonvenomous arthropods, initial encounter 09/01/2016    Bug bite, initial encounter    Carpal tunnel syndrome 04/06/2023    Cervical lymphadenitis 04/06/2023    Chronic pain of left ankle 04/06/2023    Diverticulitis of large intestine without perforation or abscess without bleeding 07/21/2013    Diverticulitis of colon    Dry eye syndrome of unspecified lacrimal gland 10/18/2022    Dry eye syndrome    Encounter for follow-up examination after completed treatment for conditions other than malignant neoplasm 11/18/2015    Hospital discharge follow-up    Encounter for immunization 02/20/2016    Immunization due    Encounter for other preprocedural examination  10/09/2015    Pre-operative clearance    Encounter for screening for infections with a predominantly sexual mode of transmission 03/16/2018    Screening for STD (sexually transmitted disease)    Encounter for screening for infections with a predominantly sexual mode of transmission 02/28/2018    Screening for STD (sexually transmitted disease)    Encounter for screening for infections with a predominantly sexual mode of transmission 02/28/2018    Screening for STD (sexually transmitted disease)    Encounter for screening for malignant neoplasm of colon 11/23/2020    Encounter for screening colonoscopy    Excessive and frequent menstruation with irregular cycle 07/21/2013    Metrorrhagia    Facial cellulitis 05/17/2023    Follicular disorder, unspecified 04/02/2021    Hemorrhage of anus and rectum 05/09/2014    Rectal hemorrhage    Hot flashes, menopausal 04/06/2023    Hyperlipidemia, unspecified 07/21/2013    Hyperlipidemia    Impetigo, unspecified 04/02/2021    Incarcerated umbilical hernia 04/06/2023    Surgery 4/1/23. Patient doing well. Dr. Tawanda Munoz.     Inflamed seborrheic keratosis 04/02/2021    Lateral epicondylitis, unspecified elbow 03/16/2018    Tennis elbow    Lateral epicondylitis, unspecified elbow 02/28/2018    Tennis elbow    Lateral epicondylitis, unspecified elbow 02/28/2018    Tennis elbow    Left sided colitis without complications (Multi) 07/21/2013    Ulcerative colitis, left sided    Lumbosacral spondylosis 04/06/2023    Myopia, bilateral 10/18/2022    Myopia of both eyes with astigmatism and presbyopia    Nevus of left conjunctiva 04/06/2023    Obstructive sleep apnea (adult) (pediatric) 07/21/2013    Obstructive sleep apnea    Other chest pain 05/08/2019    Chest tightness    Other conditions influencing health status 12/28/2020    Colonoscopy planned    Other conditions influencing health status 05/08/2019    History of cough    Other enthesopathies, not elsewhere classified 05/06/2014     Tendinitis of right shoulder    Other hypersomnia 11/08/2017    Excessive daytime sleepiness    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    Protein S deficiency    Other specified abnormal findings of blood chemistry 10/07/2016    Elevated serum creatinine    Other symptoms and signs involving the genitourinary system 11/23/2020    Abnormal urogenital discharge    Other symptoms and signs involving the nervous system 04/13/2017    Suspected sleep apnea    Personal history of diseases of the blood and blood-forming organs and certain disorders involving the immune mechanism 10/18/2022    History of sarcoidosis    Personal history of diseases of the skin and subcutaneous tissue     History of dermatitis    Personal history of other diseases of the circulatory system 07/10/2020    History of congestive heart failure    Personal history of other diseases of the female genital tract 10/26/2020    History of abnormal uterine bleeding    Personal history of other diseases of the musculoskeletal system and connective tissue 01/16/2018    History of lateral epicondylitis    Personal history of other diseases of the respiratory system 07/21/2013    History of acute bronchitis    Personal history of other diseases of the respiratory system 04/14/2014    Personal history of sinusitis    Personal history of other diseases of urinary system 02/02/2022    History of hematuria    Personal history of other diseases of urinary system 11/01/2021    History of hematuria    Personal history of other infectious and parasitic diseases 04/14/2014    History of trichomoniasis    Personal history of other infectious and parasitic diseases     History of herpes simplex infection    Personal history of other specified conditions 07/21/2020    History of chest pain    Personal history of other specified conditions 01/18/2017    History of dysuria    Personal history of other specified conditions 11/23/2020    History of diarrhea    Personal  history of other specified conditions 07/14/2022    History of itching    Personal history of other specified conditions 10/14/2020    History of dyspnea    Personal history of other specified conditions 06/18/2016    History of excessive sweating    Personal history of other specified conditions 07/26/2016    History of diarrhea    Personal history of other specified conditions 06/18/2016    History of nausea    Personal history of peptic ulcer disease     History of peptic ulcer    Personal history of pulmonary embolism 08/21/2020    History of pulmonary embolism    Personal history of urinary (tract) infections 11/05/2015    History of urinary tract infection    Piriformis syndrome of right side 04/06/2023    Postnasal drip 04/14/2014    Post-nasal drainage    Primary osteoarthritis of first carpometacarpal joint of left hand 04/06/2023    Rash and other nonspecific skin eruption 07/14/2022    Rash    Right upper quadrant abdominal tenderness 05/22/2017    Abdominal tenderness, right upper quadrant    Spondylolisthesis, acquired 04/06/2023    Type 2 diabetes mellitus 11/21/2022    Formatting of this note might be different from the original. 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    Ulcerative blepharitis    Ulnar neuropathy at elbow of right upper extremity 04/06/2023    Unspecified exophthalmos 07/21/2013    Exophthalmos    Unspecified exophthalmos 10/18/2022    Ocular proptosis    Urinary tract infection, site not specified 04/14/2014    Acute UTI    Vascular disorder of intestine, unspecified 07/21/2013    Ischemic colitis   [2]   Past Surgical History:  Procedure Laterality Date    CARDIAC CATHETERIZATION N/A 8/19/2024    Procedure: Left Heart Cath, With LV;  Surgeon:  Paul He MD;  Location: [facility];  Service: Cardiovascular;  Laterality: N/A;    CARDIAC CATHETERIZATION N/A 2024    Procedure: PCI ETHAN Stent- Coronary;  Surgeon: Paul He MD;  Location: [facility];  Service: Cardiovascular;  Laterality: N/A;    CARDIAC CATHETERIZATION N/A 2024    Procedure: IVUS - Coronary;  Surgeon: Paul He MD;  Location: [facility];  Service: Cardiovascular;  Laterality: N/A;    CT ABDOMEN PELVIS ANGIOGRAM W AND/OR WO IV CONTRAST  2015    CT ABDOMEN PELVIS ANGIOGRAM W AND/OR WO IV CONTRAST 2015 [facility] CLINICAL LEGACY    CT ABDOMEN PELVIS ANGIOGRAM W AND/OR WO IV CONTRAST  2022    CT ABDOMEN PELVIS ANGIOGRAM W AND/OR WO IV CONTRAST 2022 DOCTOR OFFICE LEGACY    CT ABDOMEN PELVIS ANGIOGRAM W AND/OR WO IV CONTRAST  2023    CT ABDOMEN PELVIS ANGIOGRAM W AND/OR WO IV CONTRAST U CT    HYSTEROSCOPY  2014    Hysteroscopy With Endometrial Ablation    OTHER SURGICAL HISTORY  2014    Left Hemicolectomy    TONSILLECTOMY  2014    Tonsillectomy    TOTAL VAGINAL HYSTERECTOMY  2014    Vaginal Hysterectomy    TUBAL LIGATION  2014    Tubal Ligation   [3]   Family History  Problem Relation Name Age of Onset    Heart disease Mother      Kidney failure Father      Cirrhosis Sister      Breast cancer Neg Hx     [4]   Social History  Tobacco Use    Smoking status: Former     Current packs/day: 0.00     Average packs/day: 0.3 packs/day for 4.0 years (1.0 ttl pk-yrs)     Types: Cigarettes     Start date:      Quit date:      Years since quittin.3     Passive exposure: Past    Smokeless tobacco: Never   Vaping Use    Vaping status: Never Used   Substance Use Topics    Alcohol use: Yes     Alcohol/week: 2.0 standard drinks of alcohol     Types: 2 Glasses of wine per week     Comment: twice a month    Drug use: Never        Vini Dover PA-C  [phone]

## 2025-05-07 ENCOUNTER — OFFICE VISIT (OUTPATIENT)
Dept: PRIMARY CARE | Facility: CLINIC | Age: 65
End: 2025-05-07
Payer: MEDICARE

## 2025-05-07 VITALS
HEIGHT: 61 IN | WEIGHT: 190.6 LBS | HEART RATE: 79 BPM | BODY MASS INDEX: 35.98 KG/M2 | TEMPERATURE: 97.7 F | DIASTOLIC BLOOD PRESSURE: 74 MMHG | OXYGEN SATURATION: 97 % | SYSTOLIC BLOOD PRESSURE: 130 MMHG

## 2025-05-07 DIAGNOSIS — E78.00 PURE HYPERCHOLESTEROLEMIA: ICD-10-CM

## 2025-05-07 DIAGNOSIS — I10 ESSENTIAL HYPERTENSION, BENIGN: ICD-10-CM

## 2025-05-07 DIAGNOSIS — I82.401 ACUTE DEEP VEIN THROMBOSIS (DVT) OF RIGHT LOWER EXTREMITY, UNSPECIFIED VEIN: ICD-10-CM

## 2025-05-07 DIAGNOSIS — D68.61 ANTIPHOSPHOLIPID ANTIBODY SYNDROME (MULTI): Primary | ICD-10-CM

## 2025-05-07 DIAGNOSIS — N32.81 OAB (OVERACTIVE BLADDER): ICD-10-CM

## 2025-05-07 DIAGNOSIS — N18.32 STAGE 3B CHRONIC KIDNEY DISEASE (MULTI): ICD-10-CM

## 2025-05-07 DIAGNOSIS — Z79.01 CHRONIC ANTICOAGULATION: ICD-10-CM

## 2025-05-07 DIAGNOSIS — I82.441 ACUTE DEEP VEIN THROMBOSIS (DVT) OF RIGHT TIBIAL VEIN (MULTI): ICD-10-CM

## 2025-05-07 DIAGNOSIS — Z86.711 HISTORY OF PULMONARY EMBOLUS (PE): ICD-10-CM

## 2025-05-07 DIAGNOSIS — I50.32 CHRONIC DIASTOLIC HEART FAILURE: ICD-10-CM

## 2025-05-07 PROCEDURE — 99215 OFFICE O/P EST HI 40 MIN: CPT | Performed by: INTERNAL MEDICINE

## 2025-05-07 PROCEDURE — 3075F SYST BP GE 130 - 139MM HG: CPT | Performed by: INTERNAL MEDICINE

## 2025-05-07 PROCEDURE — 3008F BODY MASS INDEX DOCD: CPT | Performed by: INTERNAL MEDICINE

## 2025-05-07 PROCEDURE — 3078F DIAST BP <80 MM HG: CPT | Performed by: INTERNAL MEDICINE

## 2025-05-07 PROCEDURE — 1036F TOBACCO NON-USER: CPT | Performed by: INTERNAL MEDICINE

## 2025-05-07 ASSESSMENT — ENCOUNTER SYMPTOMS
FREQUENCY: 0
ABDOMINAL PAIN: 0
NUMBNESS: 0
JOINT SWELLING: 0
BLOOD IN STOOL: 0
WHEEZING: 0
HEADACHES: 0
BACK PAIN: 0
FATIGUE: 0
WEAKNESS: 0
CONSTIPATION: 0
ACTIVITY CHANGE: 0
ARTHRALGIAS: 0
DIZZINESS: 0
COUGH: 0
AGITATION: 0
EYE REDNESS: 0
ADENOPATHY: 0
ENDOCRINE NEGATIVE: 1
DYSURIA: 0
FEVER: 0
PALPITATIONS: 0
SHORTNESS OF BREATH: 0
ALLERGIC/IMMUNOLOGIC NEGATIVE: 1

## 2025-05-07 ASSESSMENT — PATIENT HEALTH QUESTIONNAIRE - PHQ9
SUM OF ALL RESPONSES TO PHQ9 QUESTIONS 1 AND 2: 2
10. IF YOU CHECKED OFF ANY PROBLEMS, HOW DIFFICULT HAVE THESE PROBLEMS MADE IT FOR YOU TO DO YOUR WORK, TAKE CARE OF THINGS AT HOME, OR GET ALONG WITH OTHER PEOPLE: SOMEWHAT DIFFICULT
1. LITTLE INTEREST OR PLEASURE IN DOING THINGS: SEVERAL DAYS
2. FEELING DOWN, DEPRESSED OR HOPELESS: SEVERAL DAYS

## 2025-05-07 NOTE — PROGRESS NOTES
Subjective   Patient ID: Marni Lugo is a 64 y.o. female who presents for Thrombophilia (Right leg blood clot - ER 05/04/25).  Prior PCP Dr Salvador    HPI   Medical History:   64 y.o. woman with history of DVT/PE, mesenteric thrombosis, initially maintained on long-term anticoagulation with warfarin, later transitioned to apixaban (by her primary).  Recent ED visit on 5/04/25, had venous doppler study : She missed few doses fo Eliquis   IMPRESSION:  Occlusive thrombus involving proximal segment of the right posterior  tibial vein. No sonographic evidence for deep venous thrombosis in  the remainder of the visualized portions of the  right lower  extremity deep venous system.       She denies bleeding including epistaxis, gingival bleeding, hemoptysis, hematemesis, hematochezia, melena, hematuria, and vaginal bleeding.     Also with HFpEF, CKD3, remote bladder cancer, sarcoidosis.         Surgical History:   Surgical History         Past Surgical History:   Procedure Laterality Date    CARDIAC CATHETERIZATION N/A 8/19/2024     Procedure: Left Heart Cath, With LV;  Surgeon: Paul He MD;  Location: [facility];  Service: Cardiovascular;  Laterality: N/A;    CARDIAC CATHETERIZATION N/A 8/19/2024     Procedure: PCI ETHAN Stent- Coronary;  Surgeon: Paul He MD;  Location: [facility];  Service: Cardiovascular;  Laterality: N/A;    CARDIAC CATHETERIZATION N/A 8/19/2024     Procedure: IVUS - Coronary;  Surgeon: Paul He MD;  Location: [facility];  Service: Cardiovascular;  Laterality: N/A;    CT ABDOMEN PELVIS ANGIOGRAM W AND/OR WO IV CONTRAST   11/12/2015     CT ABDOMEN PELVIS ANGIOGRAM W AND/OR WO IV CONTRAST 11/12/2015 [facility] CLINICAL LEGACY    CT ABDOMEN PELVIS ANGIOGRAM W AND/OR WO IV CONTRAST   5/8/2022     CT ABDOMEN PELVIS ANGIOGRAM W AND/OR WO IV CONTRAST 5/8/2022 DOCTOR OFFICE LEGACY    CT ABDOMEN PELVIS ANGIOGRAM W AND/OR WO IV CONTRAST   4/1/2023     CT ABDOMEN  PELVIS ANGIOGRAM W AND/OR WO IV CONTRAST [facility] CT    HYSTEROSCOPY   2014     Hysteroscopy With Endometrial Ablation    OTHER SURGICAL HISTORY   2014     Left Hemicolectomy    TONSILLECTOMY   2014     Tonsillectomy    TOTAL VAGINAL HYSTERECTOMY   2014     Vaginal Hysterectomy    TUBAL LIGATION   2014     Tubal Ligation      PSHP@  Social History:   Social History            Tobacco Use    Smoking status: Former       Current packs/day: 0.00       Average packs/day: 0.3 packs/day for 4.0 years (1.0 ttl pk-yrs)       Types: Cigarettes       Start date:        Quit date:        Years since quittin.0       Passive exposure: Past    Smokeless tobacco: Never   Vaping Use    Vaping status: Never Used   Substance Use Topics    Alcohol use: Yes       Alcohol/week: 2.0 standard drinks of alcohol       Types: 2 Glasses of wine per week       Comment: twice a month    Drug use: Never      Family History  Family History[]Expand by Default          Family History   Problem Relation Name Age of Onset    Heart disease Mother        Kidney failure Father        Cirrhosis Sister                      Allergies:  Adhesive and Latex   Review of Systems   Constitutional:  Negative for activity change, fatigue and fever.   HENT:  Negative for congestion.    Eyes:  Negative for redness and visual disturbance.   Respiratory:  Negative for cough, shortness of breath and wheezing.    Cardiovascular:  Negative for chest pain, palpitations and leg swelling.   Gastrointestinal:  Negative for abdominal pain, blood in stool and constipation.   Endocrine: Negative.    Genitourinary:  Negative for dysuria, frequency and urgency.   Musculoskeletal:  Negative for arthralgias, back pain and joint swelling.   Skin:  Negative for rash.        Left breast quarter size painful mass   Allergic/Immunologic: Negative.    Neurological:  Negative for dizziness, weakness, numbness and headaches.   Hematological:  Negative  "for adenopathy.   Psychiatric/Behavioral:  Negative for agitation and behavioral problems.        Objective   /74 (BP Location: Right arm, Patient Position: Sitting, BP Cuff Size: Adult)   Pulse 79   Temp 36.5 °C (97.7 °F) (Temporal)   Ht 1.549 m (5' 1\")   Wt 86.5 kg (190 lb 9.6 oz)   SpO2 97%   BMI 36.01 kg/m²     Physical Exam  Constitutional:       Appearance: Normal appearance. She is obese.   HENT:      Head: Normocephalic and atraumatic.      Right Ear: Tympanic membrane and external ear normal.      Left Ear: Tympanic membrane and external ear normal.      Nose: No congestion.      Mouth/Throat:      Mouth: Mucous membranes are moist.      Pharynx: Oropharynx is clear.   Eyes:      Extraocular Movements: Extraocular movements intact.      Conjunctiva/sclera: Conjunctivae normal.      Pupils: Pupils are equal, round, and reactive to light.   Cardiovascular:      Rate and Rhythm: Normal rate and regular rhythm.      Pulses: Normal pulses.      Heart sounds: Normal heart sounds. No murmur heard.  Pulmonary:      Effort: Pulmonary effort is normal.      Breath sounds: Normal breath sounds. No wheezing or rales.   Abdominal:      General: Abdomen is flat. Bowel sounds are normal.      Palpations: Abdomen is soft.      Hernia: No hernia is present.   Musculoskeletal:         General: No swelling or tenderness. Normal range of motion.      Cervical back: Normal range of motion and neck supple.      Right lower leg: No edema.      Left lower leg: No edema.      Comments: She wears knee high stockings   Skin:     General: Skin is warm and dry.      Capillary Refill: Capillary refill takes less than 2 seconds.      Findings: Lesion present. No rash.      Comments:  Left Breast 7:00 position quarter sized abscess, no drainage, sl firm     Neurological:      General: No focal deficit present.      Mental Status: She is alert and oriented to person, place, and time.   Psychiatric:         Mood and Affect: Mood " normal.         Behavior: Behavior normal.         Assessment/Plan        Antiphospholipid antibody syndrome - On Eliquis now , was taking coumadin before  Has h/o DVT and PE , recent US doppler positive for DVT right leg  She will d/w her hematologist   Consider another OAC and /or  IVC filter   Continue Eliquid 5 mg bid , compliance is encouraged     CAD status post PCI  HFpEF/diastolic dysfunction  Recurrent VTE  Hyperlipidemia  Essential hypertension     Type 2 DM with microalbuminuria- On Jardiance 25 mg, She is on Rosuvastatin, Amlodipine 10 mg daily,      Has CHF, seeing Dr Paul He . She is doing ok on Furosemide. No shortness of breath. Also on it for venous insufficiency. Using compression socks. On Sildenafil, but is for Raynauds disease of fingers.        HTN :   Amlodipine, Metoprolol     OAB :   Gemtesa 75 mg daily    RLS :  Sinemet daily , per neurology    Headaches :   Topamax   Follows with Neurology      Hyperlipidemia - on Rosuvastatin 40 mg daily. No side effects. Eating not good per patient. Currently is staying with daughter to help her during divorce.      CKD3 - saw nephrology, Dr Yosi PINZON done . Has labs ordered.       [facility]  Flushot, covid booster at pharmacy  Prevnar 20 , shingrix, RSV at pharmacy

## 2025-05-12 ENCOUNTER — TELEPHONE (OUTPATIENT)
Dept: PODIATRY | Facility: HOSPITAL | Age: 65
End: 2025-05-12

## 2025-05-12 NOTE — TELEPHONE ENCOUNTER
Copied from CRM #[id number]. Topic: Transfer to Department for Scheduling  >> May 8, 2025  9:55 AM Tanya LI wrote:  CRM created and been sent to appropriate department.

## 2025-05-15 ENCOUNTER — APPOINTMENT (OUTPATIENT)
Dept: NEUROLOGY | Facility: CLINIC | Age: 65
End: 2025-05-15
Payer: MEDICARE

## 2025-05-19 ENCOUNTER — APPOINTMENT (OUTPATIENT)
Dept: PRIMARY CARE | Facility: CLINIC | Age: 65
End: 2025-05-19
Payer: MEDICARE

## 2025-05-21 ENCOUNTER — HOSPITAL ENCOUNTER (EMERGENCY)
Facility: HOSPITAL | Age: 65
Discharge: HOME | End: 2025-05-21
Attending: EMERGENCY MEDICINE
Payer: MEDICARE

## 2025-05-21 ENCOUNTER — APPOINTMENT (OUTPATIENT)
Dept: RADIOLOGY | Facility: HOSPITAL | Age: 65
End: 2025-05-21
Payer: MEDICARE

## 2025-05-21 VITALS
OXYGEN SATURATION: 99 % | TEMPERATURE: 97.6 F | BODY MASS INDEX: 35.87 KG/M2 | DIASTOLIC BLOOD PRESSURE: 76 MMHG | SYSTOLIC BLOOD PRESSURE: 118 MMHG | RESPIRATION RATE: 16 BRPM | WEIGHT: 190 LBS | HEART RATE: 64 BPM | HEIGHT: 61 IN

## 2025-05-21 DIAGNOSIS — A08.4 VIRAL GASTROENTERITIS: Primary | ICD-10-CM

## 2025-05-21 LAB
ALBUMIN SERPL BCP-MCNC: 3.9 G/DL (ref 3.4–5)
ALP SERPL-CCNC: 92 U/L (ref 33–136)
ALT SERPL W P-5'-P-CCNC: 15 U/L (ref 7–45)
ANION GAP BLDV CALCULATED.4IONS-SCNC: 7 MMOL/L (ref 10–25)
ANION GAP SERPL CALC-SCNC: 12 MMOL/L (ref 10–20)
APPEARANCE UR: CLEAR
AST SERPL W P-5'-P-CCNC: 17 U/L (ref 9–39)
BASE EXCESS BLDV CALC-SCNC: -0.9 MMOL/L (ref -2–3)
BASOPHILS # BLD AUTO: 0.04 X10*3/UL (ref 0–0.1)
BASOPHILS NFR BLD AUTO: 0.7 %
BILIRUB DIRECT SERPL-MCNC: 0.1 MG/DL (ref 0–0.3)
BILIRUB SERPL-MCNC: 0.5 MG/DL (ref 0–1.2)
BILIRUB UR STRIP.AUTO-MCNC: NEGATIVE MG/DL
BODY TEMPERATURE: 37 DEGREES CELSIUS
BUN SERPL-MCNC: 13 MG/DL (ref 6–23)
CA-I BLDV-SCNC: 1.22 MMOL/L (ref 1.1–1.33)
CALCIUM SERPL-MCNC: 8.8 MG/DL (ref 8.6–10.3)
CHLORIDE BLDV-SCNC: 110 MMOL/L (ref 98–107)
CHLORIDE SERPL-SCNC: 109 MMOL/L (ref 98–107)
CO2 SERPL-SCNC: 24 MMOL/L (ref 21–32)
COLOR UR: COLORLESS
CREAT SERPL-MCNC: 1.22 MG/DL (ref 0.5–1.05)
EGFRCR SERPLBLD CKD-EPI 2021: 50 ML/MIN/1.73M*2
EOSINOPHIL # BLD AUTO: 0.06 X10*3/UL (ref 0–0.7)
EOSINOPHIL NFR BLD AUTO: 1.1 %
ERYTHROCYTE [DISTWIDTH] IN BLOOD BY AUTOMATED COUNT: 14.9 % (ref 11.5–14.5)
GLUCOSE BLDV-MCNC: 156 MG/DL (ref 74–99)
GLUCOSE SERPL-MCNC: 143 MG/DL (ref 74–99)
GLUCOSE UR STRIP.AUTO-MCNC: ABNORMAL MG/DL
HCO3 BLDV-SCNC: 25.8 MMOL/L (ref 22–26)
HCT VFR BLD AUTO: 44.7 % (ref 36–46)
HCT VFR BLD EST: 47 % (ref 36–46)
HGB BLD-MCNC: 14.7 G/DL (ref 12–16)
HGB BLDV-MCNC: 15.5 G/DL (ref 12–16)
IMM GRANULOCYTES # BLD AUTO: 0.01 X10*3/UL (ref 0–0.7)
IMM GRANULOCYTES NFR BLD AUTO: 0.2 % (ref 0–0.9)
INHALED O2 CONCENTRATION: 21 %
KETONES UR STRIP.AUTO-MCNC: NEGATIVE MG/DL
LACTATE BLDV-SCNC: 1.3 MMOL/L (ref 0.4–2)
LEUKOCYTE ESTERASE UR QL STRIP.AUTO: NEGATIVE
LIPASE SERPL-CCNC: 94 U/L (ref 9–82)
LYMPHOCYTES # BLD AUTO: 2.55 X10*3/UL (ref 1.2–4.8)
LYMPHOCYTES NFR BLD AUTO: 47.6 %
MAGNESIUM SERPL-MCNC: 1.97 MG/DL (ref 1.6–2.4)
MCH RBC QN AUTO: 27.4 PG (ref 26–34)
MCHC RBC AUTO-ENTMCNC: 32.9 G/DL (ref 32–36)
MCV RBC AUTO: 83 FL (ref 80–100)
MONOCYTES # BLD AUTO: 0.61 X10*3/UL (ref 0.1–1)
MONOCYTES NFR BLD AUTO: 11.4 %
NEUTROPHILS # BLD AUTO: 2.09 X10*3/UL (ref 1.2–7.7)
NEUTROPHILS NFR BLD AUTO: 39 %
NITRITE UR QL STRIP.AUTO: NEGATIVE
NRBC BLD-RTO: 0 /100 WBCS (ref 0–0)
OXYHGB MFR BLDV: 17.8 % (ref 45–75)
PCO2 BLDV: 49 MM HG (ref 41–51)
PH BLDV: 7.33 PH (ref 7.33–7.43)
PH UR STRIP.AUTO: 6.5 [PH]
PLATELET # BLD AUTO: 327 X10*3/UL (ref 150–450)
PO2 BLDV: 17 MM HG (ref 35–45)
POTASSIUM BLDV-SCNC: 3.9 MMOL/L (ref 3.5–5.3)
POTASSIUM SERPL-SCNC: 3.7 MMOL/L (ref 3.5–5.3)
PROT SERPL-MCNC: 6.9 G/DL (ref 6.4–8.2)
PROT UR STRIP.AUTO-MCNC: ABNORMAL MG/DL
RBC # BLD AUTO: 5.36 X10*6/UL (ref 4–5.2)
RBC # UR STRIP.AUTO: ABNORMAL MG/DL
RBC #/AREA URNS AUTO: NORMAL /HPF
SAO2 % BLDV: 18 % (ref 45–75)
SODIUM BLDV-SCNC: 139 MMOL/L (ref 136–145)
SODIUM SERPL-SCNC: 141 MMOL/L (ref 136–145)
SP GR UR STRIP.AUTO: 1.01
SQUAMOUS #/AREA URNS AUTO: NORMAL /HPF
UROBILINOGEN UR STRIP.AUTO-MCNC: NORMAL MG/DL
WBC # BLD AUTO: 5.4 X10*3/UL (ref 4.4–11.3)
WBC #/AREA URNS AUTO: NORMAL /HPF

## 2025-05-21 PROCEDURE — 36415 COLL VENOUS BLD VENIPUNCTURE: CPT | Performed by: EMERGENCY MEDICINE

## 2025-05-21 PROCEDURE — 85025 COMPLETE CBC W/AUTO DIFF WBC: CPT | Performed by: EMERGENCY MEDICINE

## 2025-05-21 PROCEDURE — 2550000001 HC RX 255 CONTRASTS: Mod: JZ | Performed by: EMERGENCY MEDICINE

## 2025-05-21 PROCEDURE — 82435 ASSAY OF BLOOD CHLORIDE: CPT | Mod: 59 | Performed by: EMERGENCY MEDICINE

## 2025-05-21 PROCEDURE — 96374 THER/PROPH/DIAG INJ IV PUSH: CPT

## 2025-05-21 PROCEDURE — 81001 URINALYSIS AUTO W/SCOPE: CPT | Performed by: EMERGENCY MEDICINE

## 2025-05-21 PROCEDURE — 82248 BILIRUBIN DIRECT: CPT | Performed by: EMERGENCY MEDICINE

## 2025-05-21 PROCEDURE — 82435 ASSAY OF BLOOD CHLORIDE: CPT | Performed by: EMERGENCY MEDICINE

## 2025-05-21 PROCEDURE — 74177 CT ABD & PELVIS W/CONTRAST: CPT

## 2025-05-21 PROCEDURE — 74177 CT ABD & PELVIS W/CONTRAST: CPT | Performed by: RADIOLOGY

## 2025-05-21 PROCEDURE — 2500000004 HC RX 250 GENERAL PHARMACY W/ HCPCS (ALT 636 FOR OP/ED): Mod: JZ | Performed by: EMERGENCY MEDICINE

## 2025-05-21 PROCEDURE — 83735 ASSAY OF MAGNESIUM: CPT | Performed by: EMERGENCY MEDICINE

## 2025-05-21 PROCEDURE — 83690 ASSAY OF LIPASE: CPT | Performed by: EMERGENCY MEDICINE

## 2025-05-21 PROCEDURE — 84132 ASSAY OF SERUM POTASSIUM: CPT | Performed by: EMERGENCY MEDICINE

## 2025-05-21 PROCEDURE — 99285 EMERGENCY DEPT VISIT HI MDM: CPT | Mod: 25 | Performed by: EMERGENCY MEDICINE

## 2025-05-21 RX ORDER — ONDANSETRON HYDROCHLORIDE 2 MG/ML
4 INJECTION, SOLUTION INTRAVENOUS ONCE
Status: COMPLETED | OUTPATIENT
Start: 2025-05-21 | End: 2025-05-21

## 2025-05-21 RX ORDER — ONDANSETRON 4 MG/1
4 TABLET, FILM COATED ORAL EVERY 6 HOURS
Qty: 12 TABLET | Refills: 0 | Status: SHIPPED | OUTPATIENT
Start: 2025-05-21 | End: 2025-05-24

## 2025-05-21 RX ADMIN — IOHEXOL 75 ML: 350 INJECTION, SOLUTION INTRAVENOUS at 14:12

## 2025-05-21 RX ADMIN — SODIUM CHLORIDE 1000 ML: 0.9 INJECTION, SOLUTION INTRAVENOUS at 14:03

## 2025-05-21 RX ADMIN — ONDANSETRON 4 MG: 2 INJECTION, SOLUTION INTRAMUSCULAR; INTRAVENOUS at 14:03

## 2025-05-21 ASSESSMENT — PAIN DESCRIPTION - DESCRIPTORS
DESCRIPTORS: ACHING
DESCRIPTORS: ACHING

## 2025-05-21 ASSESSMENT — PAIN SCALES - GENERAL: PAINLEVEL_OUTOF10: 7

## 2025-05-21 ASSESSMENT — LIFESTYLE VARIABLES
EVER HAD A DRINK FIRST THING IN THE MORNING TO STEADY YOUR NERVES TO GET RID OF A HANGOVER: NO
EVER FELT BAD OR GUILTY ABOUT YOUR DRINKING: NO
TOTAL SCORE: 0
HAVE YOU EVER FELT YOU SHOULD CUT DOWN ON YOUR DRINKING: NO
HAVE PEOPLE ANNOYED YOU BY CRITICIZING YOUR DRINKING: NO

## 2025-05-21 ASSESSMENT — PAIN DESCRIPTION - PAIN TYPE: TYPE: ACUTE PAIN

## 2025-05-21 ASSESSMENT — PAIN DESCRIPTION - LOCATION: LOCATION: ABDOMEN

## 2025-05-21 ASSESSMENT — PAIN - FUNCTIONAL ASSESSMENT: PAIN_FUNCTIONAL_ASSESSMENT: 0-10

## 2025-05-21 NOTE — ED PROVIDER NOTES
HPI:  64-year-old female recently diagnosed with a DVT on Eliquis presents to the emergency department with abdominal pain nausea vomiting and diarrhea.  Patient's symptoms started 2 nights ago where she says she was vomiting and having diarrhea at the same time she says that her vomitus looked like a bright red color which she thought was unusual she is also been having decreased appetite and crampy abdominal pain denies any fevers or chills no back pain    Limitations to history: None  Independent Historians: None  External Records Reviewed: EMR records  ------------------------------------------------------------------------------------------------------------------------------------------  ROS: a ten point review of systems was performed and negative except as per HPI.  ------------------------------------------------------------------------------------------------------------------------------------------  PMH / PSH: as per HPI, reviewed in EMR and discussed with the patient []  MEDS:  reviewed in EMR and discussed with the patient []  ALLERGIES: reviewed in EMR[]  SocH:  as per HPI, otherwise reviewed in EMR []  FH:  as per HPI, otherwise reviewed in EMR []  ------------------------------------------------------------------------------------------------------------------------------------------  Physical Exam:  VS: As documented in the triage note and EMR flowsheet from this visit was reviewed  General: Well appearing. No acute distress.   Eyes:  Extraocular movements grossly intact. No scleral icterus.   HEENT: Atraumatic. Normocephalic.    Neck: Supple. No gross masses  CV: RRR, audible S1/S2, 2+ symmetric peripheral pulses  Resp: Clear to auscultation bilaterally. No respiratory distress.  Non-labored respirations  GI: Soft, non-tender, non-distended, no rebound or gaurding  MSK: Symmetric muscle bulk. No gross step offs or deformities.  Skin: Warm, dry, no obvious rash.  Neuro: Speech fluent. Awake. Alert.  Appropriate conversation.  Psych: Appropriate mood and affect for situation  ------------------------------------------------------------------------------------------------------------------------------------------  Hospital Course / Medical Decision Makin-year-old female with history of sarcoidosis presents emergency department with 2 nights of nausea vomiting and diarrhea.  Patient had peripheral IV access obtained labs were drawn abdominal exam was reassuring belly was soft and benign.  Labs were unremarkable electrolytes within normal limits no signs of anemia no leukocytosis.  Patient given IV fluids antiemetics and CAT scan of the abdomen pelvis with IV contrast was obtained which was negative for any acute process no bowel obstruction seen or acute inflammation.  Did catch the lower part of the patient's lungs which showed some new areas of consolidation however when I went to discuss this with the patient she is denying any URI type of symptoms has low concern for pneumonia and therefore does not wish to start antibiotics at this time she does have a history of sarcoid says that she did recently get over COVID and pneumonia a month ago.  Patient feeling much improved after ED treatment prescription for Zofran sent to her pharmacy of preference she was discharged from the ED in stable condition    She agreed that if she feels worse or develops symptoms concerning for pneumonia that she will follow-up closely with a primary care provider physician or to return to the ED    Patient care discussed with: [Admitting team, consultants, social work, THRIVE, SANITALO, radiology]  Social Determinants affecting care: [Problems affording transportation, inability to afford prescriptions, lack of housing, lack of insurance]    Final diagnosis and disposition: [] Viral gastroenteritis            Carmen Mujica MD  [phone]

## 2025-05-22 ENCOUNTER — APPOINTMENT (OUTPATIENT)
Dept: NEUROLOGY | Facility: CLINIC | Age: 65
End: 2025-05-22
Payer: MEDICARE

## 2025-05-27 DIAGNOSIS — N32.81 OVERACTIVE BLADDER: ICD-10-CM

## 2025-05-28 DIAGNOSIS — K21.9 GASTROESOPHAGEAL REFLUX DISEASE, UNSPECIFIED WHETHER ESOPHAGITIS PRESENT: ICD-10-CM

## 2025-05-28 RX ORDER — VIBEGRON 75 MG/1
1 TABLET, FILM COATED ORAL DAILY
Qty: 30 TABLET | Refills: 2 | Status: SHIPPED | OUTPATIENT
Start: 2025-05-28

## 2025-05-28 RX ORDER — PANTOPRAZOLE SODIUM 40 MG/1
40 TABLET, DELAYED RELEASE ORAL DAILY
Qty: 90 TABLET | Refills: 0 | Status: SHIPPED | OUTPATIENT
Start: 2025-05-28

## 2025-05-29 ENCOUNTER — ANCILLARY PROCEDURE (OUTPATIENT)
Dept: VASCULAR MEDICINE | Facility: CLINIC | Age: 65
End: 2025-05-29
Payer: MEDICARE

## 2025-05-29 ENCOUNTER — OFFICE VISIT (OUTPATIENT)
Dept: CARDIOLOGY | Facility: CLINIC | Age: 65
End: 2025-05-29
Payer: MEDICARE

## 2025-05-29 VITALS
BODY MASS INDEX: 35.71 KG/M2 | SYSTOLIC BLOOD PRESSURE: 116 MMHG | DIASTOLIC BLOOD PRESSURE: 74 MMHG | WEIGHT: 189 LBS | HEART RATE: 74 BPM | OXYGEN SATURATION: 96 %

## 2025-05-29 DIAGNOSIS — R60.0 LOCALIZED EDEMA: ICD-10-CM

## 2025-05-29 DIAGNOSIS — I82.441 ACUTE DEEP VEIN THROMBOSIS (DVT) OF TIBIAL VEIN OF RIGHT LOWER EXTREMITY: ICD-10-CM

## 2025-05-29 DIAGNOSIS — I82.441 ACUTE DEEP VEIN THROMBOSIS (DVT) OF TIBIAL VEIN OF RIGHT LOWER EXTREMITY: Primary | ICD-10-CM

## 2025-05-29 PROBLEM — I50.9 CONGESTIVE HEART FAILURE: Status: RESOLVED | Noted: 2018-02-20 | Resolved: 2025-05-29

## 2025-05-29 PROBLEM — I73.00 RAYNAUD'S DISEASE: Status: ACTIVE | Noted: 2023-04-06

## 2025-05-29 PROBLEM — R07.89 OTHER CHEST PAIN: Status: RESOLVED | Noted: 2023-06-28 | Resolved: 2025-05-29

## 2025-05-29 PROBLEM — J20.9 ACUTE BRONCHITIS: Status: RESOLVED | Noted: 2024-05-21 | Resolved: 2025-05-29

## 2025-05-29 PROCEDURE — 3074F SYST BP LT 130 MM HG: CPT | Performed by: INTERNAL MEDICINE

## 2025-05-29 PROCEDURE — 3078F DIAST BP <80 MM HG: CPT | Performed by: INTERNAL MEDICINE

## 2025-05-29 PROCEDURE — 93971 EXTREMITY STUDY: CPT

## 2025-05-29 PROCEDURE — 93971 EXTREMITY STUDY: CPT | Performed by: SURGERY

## 2025-05-29 PROCEDURE — 99214 OFFICE O/P EST MOD 30 MIN: CPT | Performed by: INTERNAL MEDICINE

## 2025-05-29 PROCEDURE — 99214 OFFICE O/P EST MOD 30 MIN: CPT | Mod: 25 | Performed by: INTERNAL MEDICINE

## 2025-05-29 PROCEDURE — 1036F TOBACCO NON-USER: CPT | Performed by: INTERNAL MEDICINE

## 2025-05-29 PROCEDURE — G2211 COMPLEX E/M VISIT ADD ON: HCPCS | Performed by: INTERNAL MEDICINE

## 2025-05-29 NOTE — PATIENT INSTRUCTIONS
You do NOT have the antiphospholipid antibody syndrome, which is a good thing! This means that Eliquis is likely a great medication for you. I also currently recommend AGAINST an IVC filter.    Please get new compression socks and wear them most days. Let me know on MyChart in 2-3 weeks how your leg is feeling.    I will be away for the last week of June.    I'll see you back in 6 months.    Should you have questions, please do not hesitate to call the office at [phone], or you can reach me on [employer]On[employer] if you have signed up for it. If you have urgent concerns, call the office, do not use StepOn[employer].

## 2025-05-29 NOTE — PROGRESS NOTES
Vascular Medicine established visit    History of Present Illness:  Marni Lugo is a/an 64 y.o. woman with history of recurrent venous thromboembolism here for follow up. She DOES NOT have antiphospholipid antibody syndrome as is noted in error in the chart. Antibody testing/lupus anticoagulant are normal/negative. She had been maintained long-term on warfarin but eventually we opted to switch to apixaban.     She was last seen on 1/7/2025.    She had recent emergency department visit for pain and swelling in the right leg. Had reported missing several doses of apixaban.    Pain is numbness, tingling, painful.  Tingling from ankle to foot, not constant, seems to be random, may last for 10-15 minutes  Pain is in the calf  Elevating it really high hurts; lying down in bed helps  Calf pain is worse toward the end of the day and better first thing in the morning      Medical History[1]  Surgical History[2]  Social History[3]  Family History[4]  Current Medications[5]    Physical Examination:  Blood pressure 116/74, pulse 74, weight 85.7 kg (189 lb), SpO2 96%.  No distress  No JVD or carotid bruits  Lungs clear bilaterally  Heart regular and without murmurs  Abdomen soft and non-tender  Bilateral leg swelling with dorsal foot swelling and squaring of toes  Pulses intact; triphasic tones in posterior tibial and DP on Doppler      Pertinent Labs:    Pertinent Imaging:  Venous duplex from 5/4/2025 and today personally reviewed  5/4/2025 images are a bit difficult to interpret; I think there is some tissue shadowing at site reported as non-compressible posterior tibial vein. We repeated the scan in the vasc lab and the veins are fully compressible with no evidence of thrombus    Diagnoses and all orders for this visit:  Acute deep vein thrombosis (DVT) of tibial vein of right lower extremity (Primary)  -     Vascular US Lower Extremity Venous Duplex Right; Future  -     Compression Stockings 20-30 mmHg    I reviewed our  [facility] lab images, which show no evidence of thrombus  Although it's possible Ms. Lugo could have resolved a small calf deep vein thrombosis in the last few weeks I'm skeptical of the initial diagnosis in the emergency department based on difficult imaging. I think she may have been experiencing a flare of post-thrombotic syndrome or just garden variety chronic venous insufficiency discomfort.     We discussed that she does NOT have antiphospholipid antibody syndrome. She has had negative/normal lupus anticoagulant and antiphospholipid antibodies. She may have had transiently positive antibodies at one time but at this point I feel comfortable continuing her on apixaban as she has had no clear recurrence of venous thromboembolism when taking medication reliably. We discussed the importance of adherence.    Recommend against IVC filter unless she had pulmonary embolism on therapeutic anticoagulation (with no missed doses) or had acute venous thromboembolism and could not be on anticoagulation for whatever reason.    I asked her to get new compression socks. Her current ones look pretty played out and I think may not be providing adequate compression. I asked her to check in with me in a few weeks to let me know how she's doing.    Follow up in 6 months.    Adamaris Nguyen MD, MS           [1]   Past Medical History:  Diagnosis Date    Abnormal finding of blood chemistry, unspecified 04/14/2014    Abnormal blood chemistry    Acute bronchospasm 04/18/2016    Bronchospasm    Acute candidiasis of vulva and vagina 07/21/2013    Vaginitis due to Candida albicans    Acute embolism and thrombosis of unspecified vein 02/18/2014    Venous thrombosis    Acute vaginitis 02/11/2016    Bacterial vaginosis    Adjustment disorder with mixed anxiety and depressed mood 04/06/2023    Age-related nuclear cataract, bilateral 10/18/2022    Nuclear sclerosis of both eyes    Allergic 87    Anesthesia of skin 03/16/2018    Numbness and  tingling in both hands    Arthritis     Asthma     Bitten or stung by nonvenomous insect and other nonvenomous arthropods, initial encounter 09/01/2016    Bug bite, initial encounter    Carpal tunnel syndrome 04/06/2023    Cervical lymphadenitis 04/06/2023    CHF (congestive heart failure)     Chronic kidney disease     Chronic pain of left ankle 04/06/2023    Diverticulitis of large intestine without perforation or abscess without bleeding 07/21/2013    Diverticulitis of colon    Dry eye syndrome of unspecified lacrimal gland 10/18/2022    Dry eye syndrome    Encounter for follow-up examination after completed treatment for conditions other than malignant neoplasm 11/18/2015    Hospital discharge follow-up    Encounter for immunization 02/20/2016    Immunization due    Encounter for other preprocedural examination 10/09/2015    Pre-operative clearance    Encounter for screening for infections with a predominantly sexual mode of transmission 03/16/2018    Screening for STD (sexually transmitted disease)    Encounter for screening for infections with a predominantly sexual mode of transmission 02/28/2018    Screening for STD (sexually transmitted disease)    Encounter for screening for infections with a predominantly sexual mode of transmission 02/28/2018    Screening for STD (sexually transmitted disease)    Encounter for screening for malignant neoplasm of colon 11/23/2020    Encounter for screening colonoscopy    Excessive and frequent menstruation with irregular cycle 07/21/2013    Metrorrhagia    Facial cellulitis 05/17/2023    Follicular disorder, unspecified 04/02/2021    GERD (gastroesophageal reflux disease)     Headache     Hemorrhage of anus and rectum 05/09/2014    Rectal hemorrhage    Hot flashes, menopausal 04/06/2023    Hyperlipidemia, unspecified 07/21/2013    Hyperlipidemia    Hypertension     Impetigo, unspecified 04/02/2021    Incarcerated umbilical hernia 04/06/2023    Surgery 4/1/23. Patient  doing well. Dr. Tawanda Munoz.     Inflamed seborrheic keratosis 04/02/2021    Lateral epicondylitis, unspecified elbow 03/16/2018    Tennis elbow    Lateral epicondylitis, unspecified elbow 02/28/2018    Tennis elbow    Lateral epicondylitis, unspecified elbow 02/28/2018    Tennis elbow    Left sided colitis without complications (Multi) 07/21/2013    Ulcerative colitis, left sided    Lumbosacral spondylosis 04/06/2023    Myopia, bilateral 10/18/2022    Myopia of both eyes with astigmatism and presbyopia    Nevus of left conjunctiva 04/06/2023    Obstructive sleep apnea (adult) (pediatric) 07/21/2013    Obstructive sleep apnea    Other chest pain 05/08/2019    Chest tightness    Other conditions influencing health status 12/28/2020    Colonoscopy planned    Other conditions influencing health status 05/08/2019    History of cough    Other enthesopathies, not elsewhere classified 05/06/2014    Tendinitis of right shoulder    Other hypersomnia 11/08/2017    Excessive daytime sleepiness    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    Protein S deficiency    Other specified abnormal findings of blood chemistry 10/07/2016    Elevated serum creatinine    Other symptoms and signs involving the genitourinary system 11/23/2020    Abnormal urogenital discharge    Other symptoms and signs involving the nervous system 04/13/2017    Suspected sleep apnea    Personal history of diseases of the blood and blood-forming organs and certain disorders involving the immune mechanism 10/18/2022    History of sarcoidosis    Personal history of diseases of the skin and subcutaneous tissue     History of dermatitis    Personal history of other diseases of the circulatory system 07/10/2020    History of congestive heart failure    Personal history of other diseases of the female genital tract 10/26/2020    History of abnormal uterine bleeding    Personal history of other diseases of the musculoskeletal system and connective tissue 01/16/2018     History of lateral epicondylitis    Personal history of other diseases of the respiratory system 07/21/2013    History of acute bronchitis    Personal history of other diseases of the respiratory system 04/14/2014    Personal history of sinusitis    Personal history of other diseases of urinary system 02/02/2022    History of hematuria    Personal history of other diseases of urinary system 11/01/2021    History of hematuria    Personal history of other infectious and parasitic diseases 04/14/2014    History of trichomoniasis    Personal history of other infectious and parasitic diseases     History of herpes simplex infection    Personal history of other specified conditions 07/21/2020    History of chest pain    Personal history of other specified conditions 01/18/2017    History of dysuria    Personal history of other specified conditions 11/23/2020    History of diarrhea    Personal history of other specified conditions 07/14/2022    History of itching    Personal history of other specified conditions 10/14/2020    History of dyspnea    Personal history of other specified conditions 06/18/2016    History of excessive sweating    Personal history of other specified conditions 07/26/2016    History of diarrhea    Personal history of other specified conditions 06/18/2016    History of nausea    Personal history of peptic ulcer disease     History of peptic ulcer    Personal history of pulmonary embolism 08/21/2020    History of pulmonary embolism    Personal history of urinary (tract) infections 11/05/2015    History of urinary tract infection    Piriformis syndrome of right side 04/06/2023    Postnasal drip 04/14/2014    Post-nasal drainage    Primary osteoarthritis of first carpometacarpal joint of left hand 04/06/2023    Rash and other nonspecific skin eruption 07/14/2022    Rash    Right upper quadrant abdominal tenderness 05/22/2017    Abdominal tenderness, right upper quadrant    Spondylolisthesis,  acquired 2023    Type 2 diabetes mellitus 2022    Formatting of this note might be different from the original. Comment on above: Added by Problem List Migration; 2013; Moved to Suppressed 2013 4:10PM;    Type 2 diabetes mellitus without complication, without long-term current use of insulin 2023    Ulcerative blepharitis unspecified eye, unspecified eyelid 2018    Ulcerative blepharitis    Ulnar neuropathy at elbow of right upper extremity 2023    Unspecified exophthalmos 2013    Exophthalmos    Unspecified exophthalmos 10/18/2022    Ocular proptosis    Urinary tract infection, site not specified 2014    Acute UTI    Vascular disorder of intestine, unspecified 2013    Ischemic colitis   [2]   Past Surgical History:  Procedure Laterality Date    CARDIAC CATHETERIZATION N/A 2024    Procedure: Left Heart Cath, With LV;  Surgeon: Paul He MD;  Location: [facility];  Service: Cardiovascular;  Laterality: N/A;    CARDIAC CATHETERIZATION N/A 2024    Procedure: PCI ETHAN Stent- Coronary;  Surgeon: Paul He MD;  Location: [facility];  Service: Cardiovascular;  Laterality: N/A;    CARDIAC CATHETERIZATION N/A 2024    Procedure: IVUS - Coronary;  Surgeon: Paul He MD;  Location: [facility];  Service: Cardiovascular;  Laterality: N/A;     SECTION, LOW TRANSVERSE      COLON SURGERY      CT ABDOMEN PELVIS ANGIOGRAM W AND/OR WO IV CONTRAST  2015    CT ABDOMEN PELVIS ANGIOGRAM W AND/OR WO IV CONTRAST 2015 [facility]ICAL LEGAC[facility]    CT ABDOMEN PELVIS ANGIOGRAM W AND/OR WO IV CONTRAST  2022    CT ABDOMEN PELVIS ANGIOGRAM W AND/OR WO IV CONTRAST 2022 DOCTOR OFFICE LEGACY    CT ABDOMEN PELVIS ANGIOGRAM W AND/OR WO IV CONTRAST  2023    CT ABDOMEN PELVIS ANGIOGRAM W AND/OR WO IV CONTRAST U CT    HYSTEROSCOPY  2014    Hysteroscopy With Endometrial Ablation    OTHER SURGICAL  HISTORY  2014    Left Hemicolectomy    TONSILLECTOMY  2014    Tonsillectomy    TOTAL VAGINAL HYSTERECTOMY  2014    Vaginal Hysterectomy    TUBAL LIGATION  2014    Tubal Ligation   [3]   Social History  Tobacco Use    Smoking status: Former     Current packs/day: 0.00     Average packs/day: 0.3 packs/day for 4.0 years (1.0 ttl pk-yrs)     Types: Cigarettes     Start date: 1990     Quit date:      Years since quittin.4     Passive exposure: Past    Smokeless tobacco: Never   Vaping Use    Vaping status: Never Used   Substance Use Topics    Alcohol use: Yes     Alcohol/week: 2.0 standard drinks of alcohol     Types: 2 Glasses of wine per week     Comment: twice a month    Drug use: Never   [4]   Family History  Problem Relation Name Age of Onset    Heart disease Mother HCM     Kidney failure Father      Cirrhosis Sister      Heart disease Daughter Hcm     Breast cancer Neg Hx     [5]   Current Outpatient Medications   Medication Sig Dispense Refill    albuterol 2.5 mg /3 mL (0.083 %) nebulizer solution Take 3 mL (2.5 mg) by nebulization every 6 hours if needed.      albuterol 90 mcg/actuation inhaler Inhale 2 puffs every 6 hours if needed for shortness of breath or wheezing. 18 g 2    amLODIPine (Norvasc) 10 mg tablet Take 1 tablet (10 mg) by mouth once daily. 90 tablet 3    apixaban (Eliquis) 5 mg tablet Take 1 tablet (5 mg) by mouth 2 times a day. 60 tablet 11    carbidopa-levodopa (Sinemet)  mg tablet TAKE 1 AND 1/2 TABLETS BY MOUTH DAILY 135 tablet 2    cholecalciferol, vitamin D3, (VITAMIN D3 ORAL) Take by mouth.      clopidogrel (Plavix) 75 mg tablet Take 1 tablet (75 mg) by mouth once daily. 90 tablet 1    empagliflozin (Jardiance) 25 mg Take 1 tablet (25 mg) by mouth once daily. 90 tablet 3    famotidine (Pepcid) 20 mg tablet Take 1 tablet (20 mg) by mouth 2 times a day. 180 tablet 3    fluticasone (Flonase) 50 mcg/actuation nasal spray SHAKE LIQUID AND USE 1 TO 2  SPRAYS IN EACH NOSTRIL DAILY AS NEEDED FOR SYMPTOMS OR ALLERGY      furosemide (Lasix) 20 mg tablet TAKE 1 TABLET BY MOUTH EVERY DAY AS NEEDED FOR LEG SWELLING 90 tablet 3    Gemtesa 75 mg tablet Take 1 tablet (75 mg) by mouth once daily. 30 tablet 2    metoprolol tartrate (Lopressor) 25 mg tablet Take 1 tablet (25 mg) by mouth 2 times a day. 60 tablet 11    nitroglycerin (Nitrostat) 0.4 mg SL tablet Place 1 tablet (0.4 mg) under the tongue every 5 minutes if needed for chest pain. 25 tablet 2    pantoprazole (ProtoNix) 40 mg EC tablet Take 1 tablet (40 mg) by mouth once daily. 90 tablet 0    rosuvastatin (Crestor) 40 mg tablet Take 1 tablet (40 mg) by mouth once daily. 90 tablet 1    topiramate (Topamax) 25 mg tablet Take 4 tablets (100 mg) by mouth once daily. 360 tablet 3     Current Facility-Administered Medications   Medication Dose Route Frequency Provider Last Rate Last Admin    acetaminophen (Tylenol) tablet 1,000 mg  1,000 mg oral Once Nichelle Coronado, APRN-CNP

## 2025-06-09 DIAGNOSIS — Z79.01 CHRONIC ANTICOAGULATION: ICD-10-CM

## 2025-06-09 DIAGNOSIS — D68.61 ANTIPHOSPHOLIPID ANTIBODY SYNDROME (MULTI): ICD-10-CM

## 2025-06-09 RX ORDER — FAMOTIDINE 20 MG/1
20 TABLET, FILM COATED ORAL 2 TIMES DAILY
Qty: 180 TABLET | Refills: 0 | Status: SHIPPED | OUTPATIENT
Start: 2025-06-09 | End: 2026-06-09

## 2025-06-18 ENCOUNTER — HOSPITAL ENCOUNTER (OUTPATIENT)
Dept: RADIOLOGY | Facility: CLINIC | Age: 65
Discharge: HOME | End: 2025-06-18
Payer: MEDICARE

## 2025-06-18 DIAGNOSIS — N18.31 CHRONIC KIDNEY DISEASE, STAGE 3A (MULTI): ICD-10-CM

## 2025-06-18 PROCEDURE — 76770 US EXAM ABDO BACK WALL COMP: CPT | Performed by: RADIOLOGY

## 2025-06-18 PROCEDURE — 76770 US EXAM ABDO BACK WALL COMP: CPT

## 2025-06-24 ENCOUNTER — TELEPHONE (OUTPATIENT)
Dept: PRIMARY CARE | Facility: CLINIC | Age: 65
End: 2025-06-24
Payer: MEDICARE

## 2025-06-24 DIAGNOSIS — B00.1 RECURRENT COLD SORES: Primary | ICD-10-CM

## 2025-06-24 RX ORDER — VALACYCLOVIR HYDROCHLORIDE 500 MG/1
500 TABLET, FILM COATED ORAL DAILY
Qty: 90 TABLET | Refills: 0 | Status: SHIPPED | OUTPATIENT
Start: 2025-06-24

## 2025-06-24 RX ORDER — VALACYCLOVIR HYDROCHLORIDE 500 MG/1
500 TABLET, FILM COATED ORAL DAILY
COMMUNITY
End: 2025-06-24 | Stop reason: SDUPTHER

## 2025-06-24 NOTE — TELEPHONE ENCOUNTER
PATIENT LEFT VOICEMAIL ON RX LINE STATING SHE NEEDS FORM FILLED OUT FOR HANDICAP PLACARD  IS IT OK IF SHE DROPS THEM OFF OR DO YOU NEED TO SEE HER??

## 2025-06-25 DIAGNOSIS — I50.20 SYSTOLIC CONGESTIVE HEART FAILURE, UNSPECIFIED HF CHRONICITY: ICD-10-CM

## 2025-06-25 DIAGNOSIS — J45.909 MILD ASTHMA, UNSPECIFIED WHETHER COMPLICATED, UNSPECIFIED WHETHER PERSISTENT (HHS-HCC): ICD-10-CM

## 2025-06-30 ENCOUNTER — APPOINTMENT (OUTPATIENT)
Dept: CARDIOLOGY | Facility: CLINIC | Age: 65
End: 2025-06-30
Payer: MEDICARE

## 2025-07-07 ENCOUNTER — APPOINTMENT (OUTPATIENT)
Dept: UROLOGY | Facility: CLINIC | Age: 65
End: 2025-07-07
Payer: MEDICARE

## 2025-07-07 DIAGNOSIS — E66.01 OBESITY, MORBID (MULTI): ICD-10-CM

## 2025-07-07 DIAGNOSIS — K51.50 LEFT SIDED ULCERATIVE COLITIS WITHOUT COMPLICATION (MULTI): ICD-10-CM

## 2025-07-07 DIAGNOSIS — R80.9 TYPE 2 DIABETES MELLITUS WITH MICROALBUMINURIA, WITHOUT LONG-TERM CURRENT USE OF INSULIN (MULTI): ICD-10-CM

## 2025-07-07 DIAGNOSIS — N18.32 TYPE 2 DIABETES MELLITUS WITH STAGE 3B CHRONIC KIDNEY DISEASE, WITHOUT LONG-TERM CURRENT USE OF INSULIN (MULTI): ICD-10-CM

## 2025-07-07 DIAGNOSIS — E11.22 TYPE 2 DIABETES MELLITUS WITH STAGE 3B CHRONIC KIDNEY DISEASE, WITHOUT LONG-TERM CURRENT USE OF INSULIN (MULTI): ICD-10-CM

## 2025-07-07 DIAGNOSIS — N39.41 URGE INCONTINENCE OF URINE: Primary | ICD-10-CM

## 2025-07-07 DIAGNOSIS — E11.29 TYPE 2 DIABETES MELLITUS WITH MICROALBUMINURIA, WITHOUT LONG-TERM CURRENT USE OF INSULIN (MULTI): ICD-10-CM

## 2025-07-07 DIAGNOSIS — N32.81 OVERACTIVE BLADDER: ICD-10-CM

## 2025-07-07 PROCEDURE — G2211 COMPLEX E/M VISIT ADD ON: HCPCS | Performed by: STUDENT IN AN ORGANIZED HEALTH CARE EDUCATION/TRAINING PROGRAM

## 2025-07-07 PROCEDURE — 1036F TOBACCO NON-USER: CPT | Performed by: STUDENT IN AN ORGANIZED HEALTH CARE EDUCATION/TRAINING PROGRAM

## 2025-07-07 PROCEDURE — 99214 OFFICE O/P EST MOD 30 MIN: CPT | Performed by: STUDENT IN AN ORGANIZED HEALTH CARE EDUCATION/TRAINING PROGRAM

## 2025-07-07 RX ORDER — VIBEGRON 75 MG/1
1 TABLET, FILM COATED ORAL DAILY
Qty: 30 TABLET | Refills: 11 | Status: SHIPPED | OUTPATIENT
Start: 2025-07-07

## 2025-07-07 NOTE — PROGRESS NOTES
Scribed for Dr. Rick Snider by Jed Mckeon. I, Dr. Rick Snider have personally reviewed and agreed with the information entered by the Virtual Scribe. This visit was completed via telemedicine. All issues as below were discussed and addressed but no physical exam was performed unless allowed by visual confirmation. If it was felt that the patient should be evaluated in clinic, then they were directed there. Patient verbal consented to the visit. 25.    ASSESSMENT:  Problem List Items Addressed This Visit       Overactive bladder    Relevant Medications    Gemtesa 75 mg tablet    Urge incontinence of urine - Primary      PLAN:  #OAB symptoms  #Mixed UI - urge predominant  #Urinary urgency  Denotes benefit with continuing Gemtesa for her OAB/UUI.   Tolerating the medication without side-effects.   Discussed risks of continued use and alternative treatment options.   Patient elects to continue Gemtesa, Rx refill sent to pharmacy.   Will reevaluate plan on an annual basis.    Discussion:  Discussed AUA guidelines for overactive bladder symptoms (OAB). We discussed first line therapy including lifestyle changes, bladder training, delayed voiding, double voiding, physical therapy and weight loss. We discussed second line therapy including anticholinergics, and B-agonists, as well as third-line procedures including  intravesical botox, and PTNS/neuromodulation. Risks, benefits, and complications discussed.     We discussed particular behavioral modifications that may lessen their irritative symptoms; restricting evening fluid, elevating one's legs for a while before bedtime, voiding right before bedtime, and avoiding bladder irritants like caffeine, carbonated beverages, dark-colored beverages, artificial sweeteners, acidic foods and beverages, and spicy foods. In addition, advised the followin) Avoid caffeine, 2) Avoid/restrict fluids altogether after 6 pm. Patient agrees to proceed with behavioral  modifications, timed voids, and monitoring bladder irritants.    All questions were answered to the patient’s satisfaction.  Patient agrees with the plan and wishes to proceed.  Continue follow-up for ongoing care of her chronic medical conditions.       History of Present Illness (HPI):  Marni presents virtually for a follow up visit.  The patient’s EMR has been reviewed.  Lives in [place], [place].     TODAY: (25)  Reports she has been doing well overall.   Continues on Gemtesa for her OAB/UUI.   Endorses benefit, no side-effects.   States her leakage is now resolved.   Has some ongoing frequency, but manageable.   No acute or worsening complaints at this time.     TO REVIEW: Initial visit [23]  Hx of OAB symptoms and mixed urinary incontinence (urge predominant).  C/o frequency, urgency and mixed urinary incontinence.  DTF 9x, NTF 3x; She feels she empties her bladder well.  C/o of leakage throughout the day, wears pads for this.  Occasionally leaks with coughing, laughing sneezing.  Has not tried any medications for this in the past.   NDKA, allergic to latex.     PMHx: CHF, CKD, cystitis cystica, erythema nodosum, Hx of DVT/PE, HTN, HTN, SCOTT  PSHx: hernia repair, partial hysterectomy, tubal ligation, , left hemicolectomy  FH: She does not know her FHx.  SH: Non-smoker.    Medical History[1]  Surgical History[2]  Family History[3]  Tobacco Use History[4]  Current Medications[5]  Allergies[6]  Past medical, surgical, family and social history in the chart was reviewed and is accurate including any additions to what is in this HPI.    REVIEW OF SYSTEMS (ROS):   Constitutional: denies any unintentional weight loss or change in strength.  Integumentary: denies any rashes or pruritus.  Eyes: denies any double vision or eye pain.  Ear/Nose/Mouth/Throat: denies any nosebleeds or gum bleeds.  Cardiovascular: denies any chest pain or syncope.  Respiratory: denies hemoptysis.  Gastrointestinal: denies  nausea or vomiting.  Musculoskeletal: denies muscle cramping or weakness.  Neurologic: denies convulsions or seizures.  Hematologic/Lymphatic: denies bleeding tendencies.  Endocrine: denies heat/cold intolerance.  All other systems have been reviewed and are negative unless otherwise noted in the HPI.     OBJECTIVE:  There were no vitals taken for this visit.  PHYSICAL EXAM:  PHYSICAL EXAM LIMITED 2/2 BEING A TELEHEALTH VISIT.     Labs and imaging:  Lab Results   Component Value Date    WBC 5.4 05/21/2025    HGB 14.7 05/21/2025    HCT 44.7 05/21/2025     05/21/2025    CHOL 137 05/02/2024    TRIG 146 05/02/2024    HDL 45.0 05/02/2024    ALT 15 05/21/2025    AST 17 05/21/2025     05/21/2025    K 3.7 05/21/2025     (H) 05/21/2025    CREATININE 1.22 (H) 05/21/2025    BUN 13 05/21/2025    CO2 24 05/21/2025    TSH 1.95 01/31/2024    INR 2.40 05/28/2024    HGBA1C 6.5 (H) 05/02/2024       Scribed for Dr. Rick Snider by Jed Mckeon.  I, Dr. Rick Snider have personally reviewed and agreed with the information entered by the Virtual Scribe. 07/07/25          [1]   Past Medical History:  Diagnosis Date    Abnormal finding of blood chemistry, unspecified 04/14/2014    Abnormal blood chemistry    Acute bronchospasm 04/18/2016    Bronchospasm    Acute candidiasis of vulva and vagina 07/21/2013    Vaginitis due to Candida albicans    Acute embolism and thrombosis of unspecified vein 02/18/2014    Venous thrombosis    Acute vaginitis 02/11/2016    Bacterial vaginosis    Adjustment disorder with mixed anxiety and depressed mood 04/06/2023    Age-related nuclear cataract, bilateral 10/18/2022    Nuclear sclerosis of both eyes    Allergic 87    Anesthesia of skin 03/16/2018    Numbness and tingling in both hands    Arthritis     Asthma     Bitten or stung by nonvenomous insect and other nonvenomous arthropods, initial encounter 09/01/2016    Bug bite, initial encounter    Carpal tunnel syndrome 04/06/2023     Cervical lymphadenitis 04/06/2023    CHF (congestive heart failure)     Chronic kidney disease     Chronic pain of left ankle 04/06/2023    Diverticulitis of large intestine without perforation or abscess without bleeding 07/21/2013    Diverticulitis of colon    Dry eye syndrome of unspecified lacrimal gland 10/18/2022    Dry eye syndrome    Encounter for follow-up examination after completed treatment for conditions other than malignant neoplasm 11/18/2015    Hospital discharge follow-up    Encounter for immunization 02/20/2016    Immunization due    Encounter for other preprocedural examination 10/09/2015    Pre-operative clearance    Encounter for screening for infections with a predominantly sexual mode of transmission 03/16/2018    Screening for STD (sexually transmitted disease)    Encounter for screening for infections with a predominantly sexual mode of transmission 02/28/2018    Screening for STD (sexually transmitted disease)    Encounter for screening for infections with a predominantly sexual mode of transmission 02/28/2018    Screening for STD (sexually transmitted disease)    Encounter for screening for malignant neoplasm of colon 11/23/2020    Encounter for screening colonoscopy    Excessive and frequent menstruation with irregular cycle 07/21/2013    Metrorrhagia    Facial cellulitis 05/17/2023    Follicular disorder, unspecified 04/02/2021    GERD (gastroesophageal reflux disease)     Headache     Hemorrhage of anus and rectum 05/09/2014    Rectal hemorrhage    Hot flashes, menopausal 04/06/2023    Hyperlipidemia, unspecified 07/21/2013    Hyperlipidemia    Hypertension     Impetigo, unspecified 04/02/2021    Incarcerated umbilical hernia 04/06/2023    Surgery 4/1/23. Patient doing well. Dr. Tawanda Munoz.     Inflamed seborrheic keratosis 04/02/2021    Lateral epicondylitis, unspecified elbow 03/16/2018    Tennis elbow    Lateral epicondylitis, unspecified elbow 02/28/2018    Tennis elbow     Lateral epicondylitis, unspecified elbow 02/28/2018    Tennis elbow    Left sided colitis without complications (Multi) 07/21/2013    Ulcerative colitis, left sided    Lumbosacral spondylosis 04/06/2023    Myopia, bilateral 10/18/2022    Myopia of both eyes with astigmatism and presbyopia    Nevus of left conjunctiva 04/06/2023    Obstructive sleep apnea (adult) (pediatric) 07/21/2013    Obstructive sleep apnea    Other chest pain 05/08/2019    Chest tightness    Other conditions influencing health status 12/28/2020    Colonoscopy planned    Other conditions influencing health status 05/08/2019    History of cough    Other enthesopathies, not elsewhere classified 05/06/2014    Tendinitis of right shoulder    Other hypersomnia 11/08/2017    Excessive daytime sleepiness    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    Protein S deficiency    Other specified abnormal findings of blood chemistry 10/07/2016    Elevated serum creatinine    Other symptoms and signs involving the genitourinary system 11/23/2020    Abnormal urogenital discharge    Other symptoms and signs involving the nervous system 04/13/2017    Suspected sleep apnea    Personal history of diseases of the blood and blood-forming organs and certain disorders involving the immune mechanism 10/18/2022    History of sarcoidosis    Personal history of diseases of the skin and subcutaneous tissue     History of dermatitis    Personal history of other diseases of the circulatory system 07/10/2020    History of congestive heart failure    Personal history of other diseases of the female genital tract 10/26/2020    History of abnormal uterine bleeding    Personal history of other diseases of the musculoskeletal system and connective tissue 01/16/2018    History of lateral epicondylitis    Personal history of other diseases of the respiratory system 07/21/2013    History of acute bronchitis    Personal history of other diseases of the respiratory system 04/14/2014     Personal history of sinusitis    Personal history of other diseases of urinary system 02/02/2022    History of hematuria    Personal history of other diseases of urinary system 11/01/2021    History of hematuria    Personal history of other infectious and parasitic diseases 04/14/2014    History of trichomoniasis    Personal history of other infectious and parasitic diseases     History of herpes simplex infection    Personal history of other specified conditions 07/21/2020    History of chest pain    Personal history of other specified conditions 01/18/2017    History of dysuria    Personal history of other specified conditions 11/23/2020    History of diarrhea    Personal history of other specified conditions 07/14/2022    History of itching    Personal history of other specified conditions 10/14/2020    History of dyspnea    Personal history of other specified conditions 06/18/2016    History of excessive sweating    Personal history of other specified conditions 07/26/2016    History of diarrhea    Personal history of other specified conditions 06/18/2016    History of nausea    Personal history of peptic ulcer disease     History of peptic ulcer    Personal history of pulmonary embolism 08/21/2020    History of pulmonary embolism    Personal history of urinary (tract) infections 11/05/2015    History of urinary tract infection    Piriformis syndrome of right side 04/06/2023    Postnasal drip 04/14/2014    Post-nasal drainage    Primary osteoarthritis of first carpometacarpal joint of left hand 04/06/2023    Rash and other nonspecific skin eruption 07/14/2022    Rash    Right upper quadrant abdominal tenderness 05/22/2017    Abdominal tenderness, right upper quadrant    Spondylolisthesis, acquired 04/06/2023    Type 2 diabetes mellitus 11/21/2022    Formatting of this note might be different from the original. Comment on above: Added by Problem List Migration; 2013-7-21; Moved to Suppressed Nov 23 2013 4:10PM;     Type 2 diabetes mellitus without complication, without long-term current use of insulin 2023    Ulcerative blepharitis unspecified eye, unspecified eyelid 2018    Ulcerative blepharitis    Ulnar neuropathy at elbow of right upper extremity 2023    Unspecified exophthalmos 2013    Exophthalmos    Unspecified exophthalmos 10/18/2022    Ocular proptosis    Urinary tract infection, site not specified 2014    Acute UTI    Vascular disorder of intestine, unspecified 2013    Ischemic colitis   [2]   Past Surgical History:  Procedure Laterality Date    CARDIAC CATHETERIZATION N/A 2024    Procedure: Left Heart Cath, With LV;  Surgeon: Paul He MD;  Location: [facility];  Service: Cardiovascular;  Laterality: N/A;    CARDIAC CATHETERIZATION N/A 2024    Procedure: PCI ETHAN Stent- Coronary;  Surgeon: Paul He MD;  Location: [facility];  Service: Cardiovascular;  Laterality: N/A;    CARDIAC CATHETERIZATION N/A 2024    Procedure: IVUS - Coronary;  Surgeon: Paul He MD;  Location: [facility];  Service: Cardiovascular;  Laterality: N/A;     SECTION, LOW TRANSVERSE      COLON SURGERY      CT ABDOMEN PELVIS ANGIOGRAM W AND/OR WO IV CONTRAST  2015    CT ABDOMEN PELVIS ANGIOGRAM W AND/OR WO IV CONTRAST 2015 [facility] CLINICAL LEGAC[facility]    CT ABDOMEN PELVIS ANGIOGRAM W AND/OR WO IV CONTRAST  2022    CT ABDOMEN PELVIS ANGIOGRAM W AND/OR WO IV CONTRAST 2022 DOCTOR OFFICE LEGACY    CT ABDOMEN PELVIS ANGIOGRAM W AND/OR WO IV CONTRAST  2023    CT ABDOMEN PELVIS ANGIOGRAM W AND/OR WO IV CONTRAST [facility] CT    HYSTEROSCOPY  2014    Hysteroscopy With Endometrial Ablation    OTHER SURGICAL HISTORY  2014    Left Hemicolectomy    TONSILLECTOMY  2014    Tonsillectomy    TOTAL VAGINAL HYSTERECTOMY  2014    Vaginal Hysterectomy    TUBAL LIGATION  2014    Tubal Ligation   [3]   Family  History  Problem Relation Name Age of Onset    Heart disease Mother HCM     Kidney failure Father      Cirrhosis Sister      Heart disease Daughter Hcm     Breast cancer Neg Hx     [4]   Social History  Tobacco Use   Smoking Status Former    Current packs/day: 0.00    Average packs/day: 0.3 packs/day for 4.0 years (1.0 ttl pk-yrs)    Types: Cigarettes    Start date: 1990    Quit date:     Years since quittin.5    Passive exposure: Past   Smokeless Tobacco Never   [5]   Current Outpatient Medications   Medication Sig Dispense Refill    albuterol 2.5 mg /3 mL (0.083 %) nebulizer solution Take 3 mL (2.5 mg) by nebulization every 6 hours if needed.      albuterol 90 mcg/actuation inhaler Inhale 2 puffs every 6 hours if needed for shortness of breath or wheezing. 18 g 2    amLODIPine (Norvasc) 10 mg tablet Take 1 tablet (10 mg) by mouth once daily. 90 tablet 3    apixaban (Eliquis) 5 mg tablet Take 1 tablet (5 mg) by mouth 2 times a day. 60 tablet 11    carbidopa-levodopa (Sinemet)  mg tablet TAKE 1 AND 1/2 TABLETS BY MOUTH DAILY 135 tablet 2    cholecalciferol, vitamin D3, (VITAMIN D3 ORAL) Take by mouth.      empagliflozin (Jardiance) 25 mg Take 1 tablet (25 mg) by mouth once daily. 90 tablet 3    famotidine (Pepcid) 20 mg tablet Take 1 tablet (20 mg) by mouth 2 times a day. 180 tablet 0    fluticasone (Flonase) 50 mcg/actuation nasal spray SHAKE LIQUID AND USE 1 TO 2 SPRAYS IN EACH NOSTRIL DAILY AS NEEDED FOR SYMPTOMS OR ALLERGY      furosemide (Lasix) 20 mg tablet TAKE 1 TABLET BY MOUTH EVERY DAY AS NEEDED FOR LEG SWELLING 90 tablet 3    Gemtesa 75 mg tablet Take 1 tablet (75 mg) by mouth once daily. 30 tablet 2    metoprolol tartrate (Lopressor) 25 mg tablet Take 1 tablet (25 mg) by mouth 2 times a day. 60 tablet 11    nitroglycerin (Nitrostat) 0.4 mg SL tablet Place 1 tablet (0.4 mg) under the tongue every 5 minutes if needed for chest pain. 25 tablet 2    pantoprazole (ProtoNix) 40 mg EC tablet  Take 1 tablet (40 mg) by mouth once daily. 90 tablet 0    rosuvastatin (Crestor) 40 mg tablet TAKE 1 TABLET(40 MG) BY MOUTH DAILY 90 tablet 0    topiramate (Topamax) 25 mg tablet Take 4 tablets (100 mg) by mouth once daily. 360 tablet 3    valACYclovir (Valtrex) 500 mg tablet Take 1 tablet (500 mg) by mouth once daily. 90 tablet 0     Current Facility-Administered Medications   Medication Dose Route Frequency Provider Last Rate Last Admin    acetaminophen (Tylenol) tablet 1,000 mg  1,000 mg oral Once Nichelle Coronado, APRN-CNP       [6]   Allergies  Allergen Reactions    Adhesive Unknown    Latex Hives, Itching, Rash, Swelling and Unknown

## 2025-07-15 DIAGNOSIS — E11.29 TYPE 2 DIABETES MELLITUS WITH MICROALBUMINURIA, WITHOUT LONG-TERM CURRENT USE OF INSULIN (MULTI): ICD-10-CM

## 2025-07-15 DIAGNOSIS — N18.32 TYPE 2 DIABETES MELLITUS WITH STAGE 3B CHRONIC KIDNEY DISEASE, WITHOUT LONG-TERM CURRENT USE OF INSULIN (MULTI): Primary | ICD-10-CM

## 2025-07-15 DIAGNOSIS — M62.838 MUSCLE SPASM: ICD-10-CM

## 2025-07-15 DIAGNOSIS — R80.9 TYPE 2 DIABETES MELLITUS WITH MICROALBUMINURIA, WITHOUT LONG-TERM CURRENT USE OF INSULIN (MULTI): ICD-10-CM

## 2025-07-15 DIAGNOSIS — E11.22 TYPE 2 DIABETES MELLITUS WITH STAGE 3B CHRONIC KIDNEY DISEASE, WITHOUT LONG-TERM CURRENT USE OF INSULIN (MULTI): Primary | ICD-10-CM

## 2025-07-15 DIAGNOSIS — E55.9 VITAMIN D DEFICIENCY: ICD-10-CM

## 2025-07-15 DIAGNOSIS — I50.22 CHRONIC SYSTOLIC CONGESTIVE HEART FAILURE: ICD-10-CM

## 2025-07-15 RX ORDER — ASPIRIN 325 MG
1.25 TABLET, DELAYED RELEASE (ENTERIC COATED) ORAL WEEKLY
OUTPATIENT
Start: 2025-07-15

## 2025-07-15 RX ORDER — ASPIRIN 325 MG
1.25 TABLET, DELAYED RELEASE (ENTERIC COATED) ORAL WEEKLY
COMMUNITY

## 2025-07-16 ENCOUNTER — RESULTS FOLLOW-UP (OUTPATIENT)
Dept: PRIMARY CARE | Facility: CLINIC | Age: 65
End: 2025-07-16
Payer: COMMERCIAL

## 2025-07-16 LAB
25(OH)D3+25(OH)D2 SERPL-MCNC: 62 NG/ML (ref 30–100)
EST. AVERAGE GLUCOSE BLD GHB EST-MCNC: 140 MG/DL
EST. AVERAGE GLUCOSE BLD GHB EST-SCNC: 7.7 MMOL/L
HBA1C MFR BLD: 6.5 %

## 2025-07-16 RX ORDER — CYCLOBENZAPRINE HCL 5 MG
5 TABLET ORAL DAILY PRN
Qty: 15 TABLET | Refills: 1 | Status: SHIPPED | OUTPATIENT
Start: 2025-07-16

## 2025-07-16 RX ORDER — CYCLOBENZAPRINE HCL 5 MG
5 TABLET ORAL DAILY PRN
COMMUNITY
Start: 2023-05-16 | End: 2025-07-16 | Stop reason: SDUPTHER

## 2025-07-16 NOTE — TELEPHONE ENCOUNTER
Dang Luque MD      Vit D level is NORMAL, at 62  You take vit D3 2000 units every other day now OTC  No need for higher dose prescription     Dang Luque MD      Jardiance refilled  Vit D level pending

## 2025-07-16 NOTE — RESULT ENCOUNTER NOTE
Vit D level is NORMAL, at 62   You take vit D3 2000 units every other day now OTC  No need for higher dose prescription

## 2025-07-17 ENCOUNTER — APPOINTMENT (OUTPATIENT)
Dept: OPHTHALMOLOGY | Age: 65
End: 2025-07-17
Payer: MEDICARE

## 2025-07-17 DIAGNOSIS — H52.4 MYOPIA OF BOTH EYES WITH ASTIGMATISM AND PRESBYOPIA: Primary | ICD-10-CM

## 2025-07-17 DIAGNOSIS — H52.203 MYOPIA OF BOTH EYES WITH ASTIGMATISM AND PRESBYOPIA: Primary | ICD-10-CM

## 2025-07-17 DIAGNOSIS — H52.13 MYOPIA OF BOTH EYES WITH ASTIGMATISM AND PRESBYOPIA: Primary | ICD-10-CM

## 2025-07-17 ASSESSMENT — VISUAL ACUITY
OS_CC: 20/50
OD_CC+: -2
OD_CC: 20/40
METHOD: SNELLEN - LINEAR
OS_PH_CC: 20/30
CORRECTION_TYPE: GLASSES
OS_CC+: -2
OS_BAT_MED: 20/400
OD_PH_CC: 20/20
OD_BAT_MED: 20/400
OS_PH_CC+: +1
OD_PH_CC+: -1

## 2025-07-17 ASSESSMENT — CONF VISUAL FIELD
OS_INFERIOR_TEMPORAL_RESTRICTION: 0
OD_INFERIOR_TEMPORAL_RESTRICTION: 0
OS_SUPERIOR_TEMPORAL_RESTRICTION: 0
OD_SUPERIOR_NASAL_RESTRICTION: 0
OD_INFERIOR_NASAL_RESTRICTION: 0
OS_INFERIOR_NASAL_RESTRICTION: 0
OS_NORMAL: 1
OD_SUPERIOR_TEMPORAL_RESTRICTION: 0
OS_SUPERIOR_NASAL_RESTRICTION: 0
OD_NORMAL: 1

## 2025-07-17 ASSESSMENT — REFRACTION_MANIFEST
OS_CYLINDER: -0.50
OD_AXIS: 069
OS_CYLINDER: -0.50
OS_ADD: +2.50
OS_SPHERE: -1.00
OD_CYLINDER: -0.25
OS_AXIS: 148
OD_ADD: +2.50
OD_SPHERE: -1.25
OD_SPHERE: -0.75
METHOD_AUTOREFRACTION: 1
OD_CYLINDER: -0.25
OS_AXIS: 148
OS_SPHERE: PLANO
OD_AXIS: 069

## 2025-07-17 ASSESSMENT — REFRACTION_WEARINGRX
OD_CYLINDER: SPH
SPECS_TYPE: SVL
OS_SPHERE: -1.75
OS_CYLINDER: -0.50
OS_AXIS: 171
OD_SPHERE: -2.25

## 2025-07-17 ASSESSMENT — TONOMETRY
OD_IOP_MMHG: 11
OS_IOP_MMHG: 12
IOP_METHOD: GOLDMANN APPLANATION

## 2025-07-17 ASSESSMENT — ENCOUNTER SYMPTOMS
CONSTITUTIONAL NEGATIVE: 0
HEMATOLOGIC/LYMPHATIC NEGATIVE: 0
RESPIRATORY NEGATIVE: 0
CARDIOVASCULAR NEGATIVE: 0
GASTROINTESTINAL NEGATIVE: 0
MUSCULOSKELETAL NEGATIVE: 0
PSYCHIATRIC NEGATIVE: 0
EYES NEGATIVE: 1
ALLERGIC/IMMUNOLOGIC NEGATIVE: 0
ENDOCRINE NEGATIVE: 0
NEUROLOGICAL NEGATIVE: 0

## 2025-07-17 ASSESSMENT — CUP TO DISC RATIO
OD_RATIO: .2
OS_RATIO: .2

## 2025-07-17 ASSESSMENT — EXTERNAL EXAM - RIGHT EYE: OD_EXAM: NORMAL

## 2025-07-17 ASSESSMENT — EXTERNAL EXAM - LEFT EYE: OS_EXAM: NORMAL

## 2025-07-17 ASSESSMENT — SLIT LAMP EXAM - LIDS
COMMENTS: GOOD POSITION
COMMENTS: GOOD POSITION

## 2025-07-17 ASSESSMENT — PACHYMETRY
OS_CT(UM): 516
OD_CT(UM): 508

## 2025-07-17 NOTE — PROGRESS NOTES
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 Peña     Myopia with astigmatism and presbyopia~H52.10  A spectacle prescription was dispensed to be used as needed. Mild change.      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     [place] 20/20 20/25 OD/OS and BAT 20/400 OD/OS patient is bothered by glare day and night but still driving tries to limit driving in the dark.      Chay 3-5 months. 1 year for DFE

## 2025-07-19 DIAGNOSIS — Z95.5 S/P CORONARY ARTERY STENT PLACEMENT: ICD-10-CM

## 2025-07-19 DIAGNOSIS — I20.9 ANGINA PECTORIS: ICD-10-CM

## 2025-07-25 ENCOUNTER — APPOINTMENT (OUTPATIENT)
Dept: RADIOLOGY | Facility: HOSPITAL | Age: 65
End: 2025-07-25
Payer: MEDICARE

## 2025-07-25 ENCOUNTER — TELEPHONE (OUTPATIENT)
Dept: CARDIOLOGY | Facility: HOSPITAL | Age: 65
End: 2025-07-25
Payer: MEDICARE

## 2025-07-25 ENCOUNTER — HOSPITAL ENCOUNTER (EMERGENCY)
Facility: HOSPITAL | Age: 65
Discharge: HOME | End: 2025-07-25
Payer: MEDICARE

## 2025-07-25 VITALS
HEIGHT: 61 IN | BODY MASS INDEX: 33.99 KG/M2 | HEART RATE: 64 BPM | WEIGHT: 180 LBS | RESPIRATION RATE: 18 BRPM | TEMPERATURE: 97 F | OXYGEN SATURATION: 98 %

## 2025-07-25 DIAGNOSIS — M79.18 MUSCULOSKELETAL PAIN: Primary | ICD-10-CM

## 2025-07-25 PROCEDURE — 2500000001 HC RX 250 WO HCPCS SELF ADMINISTERED DRUGS (ALT 637 FOR MEDICARE OP): Performed by: NURSE PRACTITIONER

## 2025-07-25 PROCEDURE — 73564 X-RAY EXAM KNEE 4 OR MORE: CPT | Mod: LT

## 2025-07-25 PROCEDURE — 93971 EXTREMITY STUDY: CPT

## 2025-07-25 PROCEDURE — 99284 EMERGENCY DEPT VISIT MOD MDM: CPT | Mod: 25

## 2025-07-25 PROCEDURE — 73502 X-RAY EXAM HIP UNI 2-3 VIEWS: CPT | Mod: LT

## 2025-07-25 PROCEDURE — 73564 X-RAY EXAM KNEE 4 OR MORE: CPT | Mod: LEFT SIDE | Performed by: STUDENT IN AN ORGANIZED HEALTH CARE EDUCATION/TRAINING PROGRAM

## 2025-07-25 PROCEDURE — 73502 X-RAY EXAM HIP UNI 2-3 VIEWS: CPT | Mod: LEFT SIDE | Performed by: STUDENT IN AN ORGANIZED HEALTH CARE EDUCATION/TRAINING PROGRAM

## 2025-07-25 RX ORDER — TRAMADOL HYDROCHLORIDE 50 MG/1
50 TABLET, FILM COATED ORAL ONCE
Status: COMPLETED | OUTPATIENT
Start: 2025-07-25 | End: 2025-07-25

## 2025-07-25 RX ORDER — TRAMADOL HYDROCHLORIDE 50 MG/1
50 TABLET, FILM COATED ORAL EVERY 6 HOURS PRN
Qty: 12 TABLET | Refills: 0 | Status: SHIPPED | OUTPATIENT
Start: 2025-07-25 | End: 2025-07-28

## 2025-07-25 RX ADMIN — TRAMADOL HYDROCHLORIDE 50 MG: 50 TABLET, COATED ORAL at 12:49

## 2025-07-25 ASSESSMENT — PAIN - FUNCTIONAL ASSESSMENT: PAIN_FUNCTIONAL_ASSESSMENT: 0-10

## 2025-07-25 ASSESSMENT — PAIN DESCRIPTION - LOCATION: LOCATION: LEG

## 2025-07-25 ASSESSMENT — PAIN SCALES - GENERAL: PAINLEVEL_OUTOF10: 10 - WORST POSSIBLE PAIN

## 2025-07-25 ASSESSMENT — PAIN DESCRIPTION - ORIENTATION: ORIENTATION: LEFT

## 2025-07-25 NOTE — ED TRIAGE NOTES
Pt to ED with c/o L leg pain from knee to thigh. Pt denies redness or swelling or warmth to touch. Denies injury. Denies wounds. Reports she has restless leg syndrome. No meds pta today. Pt hx of blood clots, on eliquis

## 2025-07-25 NOTE — ED PROVIDER NOTES
Emergency Department Encounter  [facility] EMERGENCY MEDICINE    Patient: Marni Lugo  MRN: [id number]  : 1960  Date of Evaluation: 2025  ED Provider: ALETHA Hanson      Chief Complaint       Chief Complaint   Patient presents with    Leg Pain     [place]    (Location/Symptom, Timing/Onset, Context/Setting, Quality, Duration, Modifying Factors, Severity) Note limiting factors.   Limitations to History: none  Historian: self  Records reviewed: EMR inpatient and outpatient notes, Care Everywhere      Marni Lugo is a 64 y.o. female who presents to the emergency department complaining of Right lower extremity pain, worse with movement ongoing for the last week.  Denies any traumatic injury.  Pain is to the left knee and left hip and in between, denies any swelling, denies any redness, fever, chills, thought that she saw some discoloration to anterior aspect left knee.  Has a history of DVT, is on Eliquis and has been compliant with her medications, denies any paresthesias.  Pain is worse with palpation and with movement, also with ambulation.    ROS:     Review of Systems  14 systems reviewed and otherwise acutely negative except as in the [place].          Past History   Medical History[1]  Surgical History[2]  Social History[3]    Medications/Allergies     Previous Medications    ALBUTEROL 2.5 MG /3 ML (0.083 %) NEBULIZER SOLUTION    Take 3 mL (2.5 mg) by nebulization every 6 hours if needed.    ALBUTEROL 90 MCG/ACTUATION INHALER    Inhale 2 puffs every 6 hours if needed for shortness of breath or wheezing.    AMLODIPINE (NORVASC) 10 MG TABLET    Take 1 tablet (10 mg) by mouth once daily.    APIXABAN (ELIQUIS) 5 MG TABLET    Take 1 tablet (5 mg) by mouth 2 times a day.    CHOLECALCIFEROL (VITAMIN D-3) 1.25 MG (50,000 UNITS) CAPSULE    Take 1 capsule (1.25 mg) by mouth 1 (one) time per week.    CYCLOBENZAPRINE (FLEXERIL) 5 MG TABLET    Take 1 tablet (5 mg) by mouth once daily as  needed for muscle spasms.    EMPAGLIFLOZIN (JARDIANCE) 25 MG TABLET    Take 1 tablet (25 mg) by mouth once daily.    FAMOTIDINE (PEPCID) 20 MG TABLET    Take 1 tablet (20 mg) by mouth 2 times a day.    FLUTICASONE (FLONASE) 50 MCG/ACTUATION NASAL SPRAY    SHAKE LIQUID AND USE 1 TO 2 SPRAYS IN EACH NOSTRIL DAILY AS NEEDED FOR SYMPTOMS OR ALLERGY    FUROSEMIDE (LASIX) 20 MG TABLET    TAKE 1 TABLET BY MOUTH EVERY DAY AS NEEDED FOR LEG SWELLING    GEMTESA 75 MG TABLET    Take 1 tablet (75 mg) by mouth once daily.    METOPROLOL TARTRATE (LOPRESSOR) 25 MG TABLET    Take 1 tablet (25 mg) by mouth 2 times a day.    NITROGLYCERIN (NITROSTAT) 0.4 MG SL TABLET    Place 1 tablet (0.4 mg) under the tongue every 5 minutes if needed for chest pain.    PANTOPRAZOLE (PROTONIX) 40 MG EC TABLET    Take 1 tablet (40 mg) by mouth once daily.    ROSUVASTATIN (CRESTOR) 40 MG TABLET    TAKE 1 TABLET(40 MG) BY MOUTH DAILY    TOPIRAMATE (TOPAMAX) 25 MG TABLET    Take 4 tablets (100 mg) by mouth once daily.    VALACYCLOVIR (VALTREX) 500 MG TABLET    Take 1 tablet (500 mg) by mouth once daily.     Allergies[4]     Physical Exam       ED Triage Vitals [07/25/25 1133]   Temperature Heart Rate Respirations BP   36.1 °C (97 °F) 64 18 --      Pulse Ox Temp Source Heart Rate Source Patient Position   98 % Skin -- --      BP Location FiO2 (%)     -- --         Physical Exam    GENERAL:  The patient appears nourished and normally developed. Vital signs as documented.     HEENT:  Head normocephalic, atraumatic, EOMs intact, PERRLA, Mucous membranes moist. Nares patent without copious rhinorrhea.  No lymphadenopathy.    PULMONARY:  Lungs are clear to auscultation, without any respiratory distress. Able to speak full sentences, no accessory muscle use    CARDIAC:   Normal rate. No murmurs, rubs or gallops    ABDOMEN:  Soft, non distended, non tender, BS positive x 4 quadrants, No rebound or guarding, no peritoneal signs, no CVA tenderness, no masses or  "organomegaly      MUSCULOSKELETAL:   Able to ambulate, Non edematous, with no obvious deformities. Pulses intact distal    SKIN:   Good color, with no significant rashes.  No pallor.    NEURO:  No obvious neurological deficits, normal sensation and strength bilaterally.  Able to follow commands, NIH 0, CN 2-12 intact.        Diagnostics   Labs:  Labs Reviewed - No data to display  Radiographs:  Lower extremity venous duplex left   Final Result   Negative study.  No deep venous thrombosis of the  left lower   extremity.        MACRO:   None        Signed by: Ramandeep Moreno 7/25/2025 1:40 PM   Dictation workstation:   [id number]      XR knee left 4+ views   Final Result   No acute fracture or dislocation.        Mild medial compartment joint space narrowing.        Signed by: Scot Galeana 7/25/2025 1:10 PM   Dictation workstation:   [id number]      XR hip left with pelvis when performed 2 or 3 views   Final Result   No acute fracture or malalignment.   Mild bilateral hip degenerative change.        Signed by: Scot Galeana 7/25/2025 1:12 PM   Dictation workstation:   [id number]                Assessment   In brief, Marni Lugo is a 64 y.o. female who presented to the emergency department with left lower extremity pain from the hip to the knee, atraumatic, no swelling.  No relief with Tylenol at home    Plan   Analgesics, imaging    Differentials   Arthritis  Tendinitis  Muscle strain  DVT  Fracture    ED Course     Diagnoses as of 07/25/25 1404   Musculoskeletal pain       Visit Vitals  Pulse 64   Temp 36.1 °C (97 °F) (Skin)   Resp 18   Ht (!) 1.549 m (5' 1\")   Wt 81.6 kg (180 lb)   SpO2 98%   BMI 34.01 kg/m²   OB Status Hysterectomy   Smoking Status Former   BSA 1.87 m²       Medications   traMADol (Ultram) tablet 50 mg (50 mg oral Given 7/25/25 1249)       Plan of care discussed, patient is stable appearing, in no distress, no signs of infection, no obvious dislocation or deformities noted on exam, has " pain that is worse with movement, pulses are intact distally, no vascular etiology, DVT study was negative, sent for x-ray of the hip and knee showing some degenerative changes, patient was given a prescription for tramadol, discharged home in stable condition, educated on any worsening signs and symptoms to return to the emergency department      Final Impression      1. Musculoskeletal pain          DISPOSITION  Disposition: Discharge  Patient condition is: Stable    Comment: Please note this report has been produced using speech recognition software and may contain errors related to that system including errors in grammar, punctuation, and spelling, as well as words and phrases that may be inappropriate.  If there are any questions or concerns please feel free to contact the dictating provider for clarification.    Ann Pablo, APRN-CNP       [1]   Past Medical History:  Diagnosis Date    Abnormal finding of blood chemistry, unspecified 04/14/2014    Abnormal blood chemistry    Acute bronchospasm 04/18/2016    Bronchospasm    Acute candidiasis of vulva and vagina 07/21/2013    Vaginitis due to Candida albicans    Acute embolism and thrombosis of unspecified vein 02/18/2014    Venous thrombosis    Acute vaginitis 02/11/2016    Bacterial vaginosis    Adjustment disorder with mixed anxiety and depressed mood 04/06/2023    Age-related nuclear cataract, bilateral 10/18/2022    Nuclear sclerosis of both eyes    Allergic 87    Anesthesia of skin 03/16/2018    Numbness and tingling in both hands    Arthritis     Asthma     Bitten or stung by nonvenomous insect and other nonvenomous arthropods, initial encounter 09/01/2016    Bug bite, initial encounter    Carpal tunnel syndrome 04/06/2023    Cervical lymphadenitis 04/06/2023    CHF (congestive heart failure)     Chronic kidney disease     Chronic pain of left ankle 04/06/2023    Diverticulitis of large intestine without perforation or abscess without bleeding  07/21/2013    Diverticulitis of colon    Dry eye syndrome of unspecified lacrimal gland 10/18/2022    Dry eye syndrome    Encounter for follow-up examination after completed treatment for conditions other than malignant neoplasm 11/18/2015    Hospital discharge follow-up    Encounter for immunization 02/20/2016    Immunization due    Encounter for other preprocedural examination 10/09/2015    Pre-operative clearance    Encounter for screening for infections with a predominantly sexual mode of transmission 03/16/2018    Screening for STD (sexually transmitted disease)    Encounter for screening for infections with a predominantly sexual mode of transmission 02/28/2018    Screening for STD (sexually transmitted disease)    Encounter for screening for infections with a predominantly sexual mode of transmission 02/28/2018    Screening for STD (sexually transmitted disease)    Encounter for screening for malignant neoplasm of colon 11/23/2020    Encounter for screening colonoscopy    Excessive and frequent menstruation with irregular cycle 07/21/2013    Metrorrhagia    Facial cellulitis 05/17/2023    Follicular disorder, unspecified 04/02/2021    GERD (gastroesophageal reflux disease)     Headache     Hemorrhage of anus and rectum 05/09/2014    Rectal hemorrhage    Hot flashes, menopausal 04/06/2023    Hyperlipidemia, unspecified 07/21/2013    Hyperlipidemia    Hypertension     Impetigo, unspecified 04/02/2021    Incarcerated umbilical hernia 04/06/2023    Surgery 4/1/23. Patient doing well. Dr. Tawanda Munoz.     Inflamed seborrheic keratosis 04/02/2021    Lateral epicondylitis, unspecified elbow 03/16/2018    Tennis elbow    Lateral epicondylitis, unspecified elbow 02/28/2018    Tennis elbow    Lateral epicondylitis, unspecified elbow 02/28/2018    Tennis elbow    Left sided colitis without complications (Multi) 07/21/2013    Ulcerative colitis, left sided    Lumbosacral spondylosis 04/06/2023    Myopia, bilateral  10/18/2022    Myopia of both eyes with astigmatism and presbyopia    Nevus of left conjunctiva 04/06/2023    Obstructive sleep apnea (adult) (pediatric) 07/21/2013    Obstructive sleep apnea    Other chest pain 05/08/2019    Chest tightness    Other conditions influencing health status 12/28/2020    Colonoscopy planned    Other conditions influencing health status 05/08/2019    History of cough    Other enthesopathies, not elsewhere classified 05/06/2014    Tendinitis of right shoulder    Other hypersomnia 11/08/2017    Excessive daytime sleepiness    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    Protein S deficiency    Other specified abnormal findings of blood chemistry 10/07/2016    Elevated serum creatinine    Other symptoms and signs involving the genitourinary system 11/23/2020    Abnormal urogenital discharge    Other symptoms and signs involving the nervous system 04/13/2017    Suspected sleep apnea    Personal history of diseases of the blood and blood-forming organs and certain disorders involving the immune mechanism 10/18/2022    History of sarcoidosis    Personal history of diseases of the skin and subcutaneous tissue     History of dermatitis    Personal history of other diseases of the circulatory system 07/10/2020    History of congestive heart failure    Personal history of other diseases of the female genital tract 10/26/2020    History of abnormal uterine bleeding    Personal history of other diseases of the musculoskeletal system and connective tissue 01/16/2018    History of lateral epicondylitis    Personal history of other diseases of the respiratory system 07/21/2013    History of acute bronchitis    Personal history of other diseases of the respiratory system 04/14/2014    Personal history of sinusitis    Personal history of other diseases of urinary system 02/02/2022    History of hematuria    Personal history of other diseases of urinary system 11/01/2021    History of hematuria    Personal  history of other infectious and parasitic diseases 04/14/2014    History of trichomoniasis    Personal history of other infectious and parasitic diseases     History of herpes simplex infection    Personal history of other specified conditions 07/21/2020    History of chest pain    Personal history of other specified conditions 01/18/2017    History of dysuria    Personal history of other specified conditions 11/23/2020    History of diarrhea    Personal history of other specified conditions 07/14/2022    History of itching    Personal history of other specified conditions 10/14/2020    History of dyspnea    Personal history of other specified conditions 06/18/2016    History of excessive sweating    Personal history of other specified conditions 07/26/2016    History of diarrhea    Personal history of other specified conditions 06/18/2016    History of nausea    Personal history of peptic ulcer disease     History of peptic ulcer    Personal history of pulmonary embolism 08/21/2020    History of pulmonary embolism    Personal history of urinary (tract) infections 11/05/2015    History of urinary tract infection    Piriformis syndrome of right side 04/06/2023    Postnasal drip 04/14/2014    Post-nasal drainage    Primary osteoarthritis of first carpometacarpal joint of left hand 04/06/2023    Rash and other nonspecific skin eruption 07/14/2022    Rash    Right upper quadrant abdominal tenderness 05/22/2017    Abdominal tenderness, right upper quadrant    Spondylolisthesis, acquired 04/06/2023    Type 2 diabetes mellitus 11/21/2022    Formatting of this note might be different from the original. 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    Ulcerative blepharitis    Ulnar neuropathy at  elbow of right upper extremity 2023    Unspecified exophthalmos 2013    Exophthalmos    Unspecified exophthalmos 10/18/2022    Ocular proptosis    Urinary tract infection, site not specified 2014    Acute UTI    Vascular disorder of intestine, unspecified 2013    Ischemic colitis   [2]   Past Surgical History:  Procedure Laterality Date    CARDIAC CATHETERIZATION N/A 2024    Procedure: Left Heart Cath, With LV;  Surgeon: Paul He MD;  Location: [facility];  Service: Cardiovascular;  Laterality: N/A;    CARDIAC CATHETERIZATION N/A 2024    Procedure: PCI ETHAN Stent- Coronary;  Surgeon: Paul He MD;  Location: [facility];  Service: Cardiovascular;  Laterality: N/A;    CARDIAC CATHETERIZATION N/A 2024    Procedure: IVUS - Coronary;  Surgeon: Paul He MD;  Location: [facility];  Service: Cardiovascular;  Laterality: N/A;     SECTION, LOW TRANSVERSE      COLON SURGERY      CT ABDOMEN PELVIS ANGIOGRAM W AND/OR WO IV CONTRAST  2015    CT ABDOMEN PELVIS ANGIOGRAM W AND/OR WO IV CONTRAST 2015 [facility] CLINICAL LEGACY    CT ABDOMEN PELVIS ANGIOGRAM W AND/OR WO IV CONTRAST  2022    CT ABDOMEN PELVIS ANGIOGRAM W AND/OR WO IV CONTRAST 2022 DOCTOR OFFICE LEGACY    CT ABDOMEN PELVIS ANGIOGRAM W AND/OR WO IV CONTRAST  2023    CT ABDOMEN PELVIS ANGIOGRAM W AND/OR WO IV CONTRAST U CT    HYSTEROSCOPY  2014    Hysteroscopy With Endometrial Ablation    OTHER SURGICAL HISTORY  2014    Left Hemicolectomy    TONSILLECTOMY  2014    Tonsillectomy    TOTAL VAGINAL HYSTERECTOMY  2014    Vaginal Hysterectomy    TUBAL LIGATION  2014    Tubal Ligation   [3]   Social History  Socioeconomic History    Marital status: Single   Tobacco Use    Smoking status: Former     Current packs/day: 0.00     Average packs/day: 0.3 packs/day for 4.0 years (1.0 ttl pk-yrs)     Types: Cigarettes     Start date:  1990     Quit date:      Years since quittin.5     Passive exposure: Past    Smokeless tobacco: Never   Vaping Use    Vaping status: Never Used   Substance and Sexual Activity    Alcohol use: Yes     Alcohol/week: 2.0 standard drinks of alcohol     Types: 2 Glasses of wine per week     Comment: twice a month    Drug use: Never    Sexual activity: Yes     Partners: Male     Birth control/protection: Condom Male, Female Sterilization   [4]   Allergies  Allergen Reactions    Adhesive Unknown    Latex Hives, Itching, Rash, Swelling and Unknown        CEDRIC Hanson-THIERRY  [phone]

## 2025-07-25 NOTE — TELEPHONE ENCOUNTER
Patient called office asking for a refill of her clopidogrel and asks it be sent to [id number] - [facility] [address] [facility]   [address], [facility] [id number]

## 2025-07-26 RX ORDER — CLOPIDOGREL BISULFATE 75 MG/1
75 TABLET ORAL DAILY
Qty: 90 TABLET | Refills: 0 | Status: SHIPPED | OUTPATIENT
Start: 2025-07-26

## 2025-07-28 ENCOUNTER — TELEPHONE (OUTPATIENT)
Dept: CARDIOLOGY | Facility: HOSPITAL | Age: 65
End: 2025-07-28

## 2025-07-28 ENCOUNTER — APPOINTMENT (OUTPATIENT)
Dept: OPHTHALMOLOGY | Age: 65
End: 2025-07-28
Payer: MEDICARE

## 2025-07-28 DIAGNOSIS — H25.9 AGE-RELATED CATARACT OF BOTH EYES, UNSPECIFIED AGE-RELATED CATARACT TYPE: Primary | ICD-10-CM

## 2025-07-28 DIAGNOSIS — H18.513 FUCHS' CORNEAL DYSTROPHY OF BOTH EYES: ICD-10-CM

## 2025-07-28 PROCEDURE — 99213 OFFICE O/P EST LOW 20 MIN: CPT | Performed by: OPHTHALMOLOGY

## 2025-07-28 ASSESSMENT — ENCOUNTER SYMPTOMS
PSYCHIATRIC NEGATIVE: 0
ALLERGIC/IMMUNOLOGIC NEGATIVE: 0
CARDIOVASCULAR NEGATIVE: 0
GASTROINTESTINAL NEGATIVE: 0
NEUROLOGICAL NEGATIVE: 0
HEMATOLOGIC/LYMPHATIC NEGATIVE: 0
EYES NEGATIVE: 1
RESPIRATORY NEGATIVE: 0
CONSTITUTIONAL NEGATIVE: 0
ENDOCRINE NEGATIVE: 0
MUSCULOSKELETAL NEGATIVE: 0

## 2025-07-28 ASSESSMENT — CUP TO DISC RATIO
OD_RATIO: .2
OS_RATIO: .2

## 2025-07-28 ASSESSMENT — VISUAL ACUITY
OD_BAT_MED: 20/200
OD_CC: 20/40
OS_BAT_MED: 20/200
OS_CC: 20/50
METHOD: SNELLEN - LINEAR
CORRECTION_TYPE: GLASSES

## 2025-07-28 ASSESSMENT — TONOMETRY
IOP_METHOD: GOLDMANN APPLANATION
OD_IOP_MMHG: 12
OS_IOP_MMHG: 12

## 2025-07-28 ASSESSMENT — CONF VISUAL FIELD
OD_SUPERIOR_TEMPORAL_RESTRICTION: 0
OS_SUPERIOR_NASAL_RESTRICTION: 0
OS_SUPERIOR_TEMPORAL_RESTRICTION: 0
OS_INFERIOR_NASAL_RESTRICTION: 0
OS_INFERIOR_TEMPORAL_RESTRICTION: 0
OD_SUPERIOR_NASAL_RESTRICTION: 0
OD_NORMAL: 1
OS_NORMAL: 1
OD_INFERIOR_NASAL_RESTRICTION: 0
OD_INFERIOR_TEMPORAL_RESTRICTION: 0

## 2025-07-28 ASSESSMENT — REFRACTION_WEARINGRX
OD_CYLINDER: SPHERE
OS_AXIS: 171
OD_SPHERE: -2.25
OS_CYLINDER: -0.50
SPECS_TYPE: SVL
OS_SPHERE: -1.75

## 2025-07-28 ASSESSMENT — REFRACTION_MANIFEST
OS_CYLINDER: -0.50
OD_SPHERE: -1.25
OS_SPHERE: -1.00
OS_AXIS: 150
OD_AXIS: 070
OD_ADD: +2.50
OD_CYLINDER: -0.25
OS_ADD: +2.50

## 2025-07-28 ASSESSMENT — PACHYMETRY
OS_CT(UM): 516
OD_CT(UM): 508

## 2025-07-28 ASSESSMENT — EXTERNAL EXAM - RIGHT EYE: OD_EXAM: NORMAL

## 2025-07-28 ASSESSMENT — SLIT LAMP EXAM - LIDS
COMMENTS: GOOD POSITION
COMMENTS: GOOD POSITION

## 2025-07-28 ASSESSMENT — EXTERNAL EXAM - LEFT EYE: OS_EXAM: NORMAL

## 2025-07-28 NOTE — PROGRESS NOTES
Assessment/Plan   Diagnoses and all orders for this visit:  Age-related cataract of both eyes, unspecified age-related cataract type  Mild on exam. Patient is tolerating.  Fuchs' corneal dystrophy of both eyes  Confluent guttae 5.5mmHx 7.0mmV confulent guttae, no corneal edema.    Corrects to 20/20 OD, 20/25 OS    Would hold off on doing any surgery for now as she is tolerating and her findings are still mild.    See in 1 year.

## 2025-07-28 NOTE — TELEPHONE ENCOUNTER
Called pt and LVM to set up a fuv w/ Dr. He. Pt needs medication refills but has not been able to make her fuv, so a neww appt needs made. I asked pt to give the office a call back to schedule.    [place] PCNA

## 2025-08-01 ENCOUNTER — APPOINTMENT (OUTPATIENT)
Dept: RADIOLOGY | Facility: HOSPITAL | Age: 65
End: 2025-08-01
Payer: MEDICARE

## 2025-08-01 ENCOUNTER — PHARMACY VISIT (OUTPATIENT)
Dept: PHARMACY | Facility: CLINIC | Age: 65
End: 2025-08-01
Payer: COMMERCIAL

## 2025-08-01 ENCOUNTER — HOSPITAL ENCOUNTER (EMERGENCY)
Facility: HOSPITAL | Age: 65
Discharge: HOME | End: 2025-08-01
Payer: MEDICARE

## 2025-08-01 ENCOUNTER — APPOINTMENT (OUTPATIENT)
Dept: OPHTHALMOLOGY | Facility: CLINIC | Age: 65
End: 2025-08-01
Payer: MEDICARE

## 2025-08-01 VITALS
WEIGHT: 185 LBS | DIASTOLIC BLOOD PRESSURE: 85 MMHG | BODY MASS INDEX: 34.93 KG/M2 | HEART RATE: 61 BPM | SYSTOLIC BLOOD PRESSURE: 147 MMHG | TEMPERATURE: 98.2 F | OXYGEN SATURATION: 99 % | HEIGHT: 61 IN | RESPIRATION RATE: 16 BRPM

## 2025-08-01 DIAGNOSIS — I50.22 CHRONIC SYSTOLIC CONGESTIVE HEART FAILURE: ICD-10-CM

## 2025-08-01 DIAGNOSIS — Z86.711 HISTORY OF PULMONARY EMBOLUS (PE): ICD-10-CM

## 2025-08-01 DIAGNOSIS — N39.0 URINARY TRACT INFECTION WITHOUT HEMATURIA, SITE UNSPECIFIED: ICD-10-CM

## 2025-08-01 DIAGNOSIS — S39.012A LUMBAR STRAIN, INITIAL ENCOUNTER: Primary | ICD-10-CM

## 2025-08-01 DIAGNOSIS — I20.89 STABLE ANGINA: ICD-10-CM

## 2025-08-01 DIAGNOSIS — M54.30 SCIATICA, UNSPECIFIED LATERALITY: ICD-10-CM

## 2025-08-01 LAB
APPEARANCE UR: CLEAR
BACTERIA #/AREA URNS AUTO: ABNORMAL /HPF
BILIRUB UR STRIP.AUTO-MCNC: NEGATIVE MG/DL
COLOR UR: ABNORMAL
GLUCOSE UR STRIP.AUTO-MCNC: ABNORMAL MG/DL
KETONES UR STRIP.AUTO-MCNC: NEGATIVE MG/DL
LEUKOCYTE ESTERASE UR QL STRIP.AUTO: ABNORMAL
MUCOUS THREADS #/AREA URNS AUTO: ABNORMAL /LPF
NITRITE UR QL STRIP.AUTO: NEGATIVE
PH UR STRIP.AUTO: 6 [PH]
PROT UR STRIP.AUTO-MCNC: NEGATIVE MG/DL
RBC # UR STRIP.AUTO: NEGATIVE MG/DL
RBC #/AREA URNS AUTO: ABNORMAL /HPF
SP GR UR STRIP.AUTO: 1.01
SQUAMOUS #/AREA URNS AUTO: ABNORMAL /HPF
UROBILINOGEN UR STRIP.AUTO-MCNC: NORMAL MG/DL
WBC #/AREA URNS AUTO: ABNORMAL /HPF

## 2025-08-01 PROCEDURE — 96375 TX/PRO/DX INJ NEW DRUG ADDON: CPT

## 2025-08-01 PROCEDURE — 2500000004 HC RX 250 GENERAL PHARMACY W/ HCPCS (ALT 636 FOR OP/ED): Performed by: NURSE PRACTITIONER

## 2025-08-01 PROCEDURE — 72100 X-RAY EXAM L-S SPINE 2/3 VWS: CPT

## 2025-08-01 PROCEDURE — 72100 X-RAY EXAM L-S SPINE 2/3 VWS: CPT | Performed by: STUDENT IN AN ORGANIZED HEALTH CARE EDUCATION/TRAINING PROGRAM

## 2025-08-01 PROCEDURE — 96374 THER/PROPH/DIAG INJ IV PUSH: CPT

## 2025-08-01 PROCEDURE — 87086 URINE CULTURE/COLONY COUNT: CPT | Mod: PORLAB | Performed by: NURSE PRACTITIONER

## 2025-08-01 PROCEDURE — 99284 EMERGENCY DEPT VISIT MOD MDM: CPT

## 2025-08-01 PROCEDURE — RXMED WILLOW AMBULATORY MEDICATION CHARGE

## 2025-08-01 PROCEDURE — 81003 URINALYSIS AUTO W/O SCOPE: CPT | Performed by: NURSE PRACTITIONER

## 2025-08-01 RX ORDER — METHOCARBAMOL 100 MG/ML
1000 INJECTION, SOLUTION INTRAMUSCULAR; INTRAVENOUS ONCE
Status: COMPLETED | OUTPATIENT
Start: 2025-08-01 | End: 2025-08-01

## 2025-08-01 RX ORDER — CEFUROXIME AXETIL 500 MG/1
250 TABLET ORAL 2 TIMES DAILY
Qty: 7 TABLET | Refills: 0 | Status: SHIPPED | OUTPATIENT
Start: 2025-08-01

## 2025-08-01 RX ORDER — PREDNISONE 20 MG/1
40 TABLET ORAL DAILY
Qty: 10 TABLET | Refills: 0 | Status: SHIPPED | OUTPATIENT
Start: 2025-08-01 | End: 2025-08-06

## 2025-08-01 RX ORDER — METHOCARBAMOL 750 MG/1
750 TABLET, FILM COATED ORAL 4 TIMES DAILY PRN
Qty: 20 TABLET | Refills: 0 | Status: SHIPPED | OUTPATIENT
Start: 2025-08-01 | End: 2025-08-06

## 2025-08-01 RX ORDER — METOPROLOL TARTRATE 25 MG/1
25 TABLET, FILM COATED ORAL 2 TIMES DAILY
Qty: 60 TABLET | Refills: 11 | Status: SHIPPED | OUTPATIENT
Start: 2025-08-01 | End: 2026-08-01

## 2025-08-01 RX ORDER — MORPHINE SULFATE 4 MG/ML
4 INJECTION INTRAVENOUS ONCE
Status: COMPLETED | OUTPATIENT
Start: 2025-08-01 | End: 2025-08-01

## 2025-08-01 RX ADMIN — METHOCARBAMOL 1000 MG: 1000 INJECTION, SOLUTION INTRAMUSCULAR; INTRAVENOUS at 12:21

## 2025-08-01 RX ADMIN — MORPHINE SULFATE 4 MG: 4 INJECTION INTRAVENOUS at 12:21

## 2025-08-01 ASSESSMENT — PAIN DESCRIPTION - LOCATION: LOCATION: BACK

## 2025-08-01 ASSESSMENT — LIFESTYLE VARIABLES
HAVE PEOPLE ANNOYED YOU BY CRITICIZING YOUR DRINKING: NO
EVER HAD A DRINK FIRST THING IN THE MORNING TO STEADY YOUR NERVES TO GET RID OF A HANGOVER: NO
TOTAL SCORE: 0
HAVE YOU EVER FELT YOU SHOULD CUT DOWN ON YOUR DRINKING: NO
EVER FELT BAD OR GUILTY ABOUT YOUR DRINKING: NO

## 2025-08-01 ASSESSMENT — PAIN SCALES - GENERAL
PAINLEVEL_OUTOF10: 8
PAINLEVEL_OUTOF10: 10 - WORST POSSIBLE PAIN

## 2025-08-01 ASSESSMENT — PAIN - FUNCTIONAL ASSESSMENT: PAIN_FUNCTIONAL_ASSESSMENT: 0-10

## 2025-08-01 NOTE — ED PROVIDER NOTES
HPI   Chief Complaint   Patient presents with    Back Pain     Back pain that radiates down the outer portion of both legs that feels like it's both hot and cold. Pain started 2 evenings ago. That day pt was busy taking care of her grandkids doing lots of lifting and bending.       Marni is a 64-year-old female presenting here to the emergency department today for lumbar back pain radiating into both legs.  She reports the pain stops at her knees.  She reports she has noticed she has been dragging her right foot more than the left foot.  She reports no history of spine surgery.  She reports no bowel or bladder changes.  Denies any saddle anesthesia.  She reports she has been assisting her daughter with moving items recently.  She denies any specific injury.  She denies any fevers or chills.  She reports she notices she has had to urinate more often than usual.  She reports no flank pain.  Denies any nausea vomiting or diarrhea.  Denies any chest pain or dyspnea.  She has no other associated complaints.              Patient History   Medical History[1]  Surgical History[2]  Family History[3]  Social History[4]    Physical Exam   ED Triage Vitals [08/01/25 1149]   Temperature Heart Rate Respirations BP   36.8 °C (98.2 °F) 61 16 147/85      Pulse Ox Temp src Heart Rate Source Patient Position   99 % -- -- --      BP Location FiO2 (%)     -- --       Physical Exam  Vitals and nursing note reviewed.   Constitutional:       Comments: General: Conversant and pleasant interactive and nontoxic.     Head: Normocephalic/atraumatic     Eyes: PERRL, EOMI, no conjunctivitis     Nares: Without d/c.    Ears: No erythema or d/c noted.    Oralpharnyx: No Exudate, tonsillar edema, or pharyngeal erythema, mucous membranes moist    Neck: Supple, Trachea midline    Cardovascular: RRR without murmur, brisk capillary refill, no peripheral edema.    Lungs: CTA B/L, non-labored    Abd: Soft nontender, no guarding, no rebound.    : No CVA  tenderness noted bilateral    Musculoskeletal: No edema or erythema noted to extremities.  No midline spinal tenderness    Neuro: AOx3, GCS 15, sensory is intact to touch of the bilateral lower extremities, ambulates with even steady gait no foot drop, dorsiflexion and plantarflexion strength equal 4 out of 5.    Psych: Normal Affect     Derm:  No rashes           ED Course & MDM   ED Course as of 08/01/25 1333   Fri Aug 01, 2025   1329 Leukocyte Esterase, Urine(!): 250 Sarah/uL [TG]      ED Course User Index  [TG] Parish Rodriguez, APRN-CNP         Diagnoses as of 08/01/25 1333   Lumbar strain, initial encounter   Sciatica, unspecified laterality   Urinary tract infection without hematuria, site unspecified                 No data recorded     Boris Coma Scale Score: 15 (08/01/25 1149 : Sylvester Lugo RN)                           Medical Decision Making  1211: Considered lumbar radiculopathy, lumbosacral strain, cauda equina syndrome.   suspect patient with lumbar radiculopathy and a component of lumbosacral strain.  Currently bladder scans being performed postvoid.  Patient ordered morphine IV and Robaxin IV.  Will reassess patient 30 minutes after receiving these medications to determine how she is feeling and if further imaging is warranted.    1249: Patient reporting she is feeling markedly improved.  Plan will be to ambulate patient.  I will get a plain film image of patient's spine to ensure no lesions noted.  I do not believe at this moment she requires MRI imaging given her improvement.  Her postvoid residual was 6 mL.    1333: External documents/labs were reviewed, x-ray interpretation reviewed, discussed with consultants/staff, shared decision making utilized by explaining the results and plan of care for disposition and next steps of care with the patient and/or family, social determinants of health considered, discussed options for treatment with or without prescription medications, and potential  impacted patient's medical/surgical comorbidities were assessed and considered when determining the treatment course and outcome.  Patient feeling markedly improved.  She has been ambulatory.  No foot drop.  X-ray result reviewed with patient.  She has to follow-up with her primary care physician in 2 to 3 days.  Given her increased frequency of urination and urinalysis result will prescribe cefuroxime 250 mg for her to take twice daily for 7 days.  Urine culture result pending.  She has no CVA tenderness do not suspect pyelonephritis.  She appears well.  Considered musculoligamentous, discogenic, fracture, spondylolisthesis, ankylosing spondylitis, osteomyelitis, epidural abscess/hematoma, AAA, upper UTI, aortic dissection.  Pain controlled in ED, pt able to ambulate.  No signs of neurologic or progressive neurologic deficits including bowel/bladder complaints and cauda equina syndrome.  Afebrile with no evidence of infectious, vascular or neoplastic etiologies.  She will be prescribed Robaxin to take as prescribed as needed for muscle spasm and prednisone 40 mg to take daily for 5 days for her lumbar radicular pain.  Patient discharge stable condition with computer instructions.  She is to return back to the emergency department for any new or worsening symptoms.    (Please note that portions of this report may have been produced with a voice recognition program. This document may contain errors in grammar, punctuation , and/or spelling as well as words/phrases that may be inappropriate. Efforts were made to edit the dictations, but occasionally words are mistranscribed.)        Procedure  Procedures       Parish Rodriguez, CEDRIC-CNP  08/01/25 1334         [1]   Past Medical History:  Diagnosis Date    Abnormal finding of blood chemistry, unspecified 04/14/2014    Abnormal blood chemistry    Acute bronchospasm 04/18/2016    Bronchospasm    Acute candidiasis of vulva and vagina 07/21/2013    Vaginitis due to Candida  albicans    Acute embolism and thrombosis of unspecified vein 02/18/2014    Venous thrombosis    Acute vaginitis 02/11/2016    Bacterial vaginosis    Adjustment disorder with mixed anxiety and depressed mood 04/06/2023    Age-related nuclear cataract, bilateral 10/18/2022    Nuclear sclerosis of both eyes    Allergic 87    Anesthesia of skin 03/16/2018    Numbness and tingling in both hands    Arthritis     Asthma     Bitten or stung by nonvenomous insect and other nonvenomous arthropods, initial encounter 09/01/2016    Bug bite, initial encounter    Carpal tunnel syndrome 04/06/2023    Cervical lymphadenitis 04/06/2023    CHF (congestive heart failure)     Chronic kidney disease     Chronic pain of left ankle 04/06/2023    Diverticulitis of large intestine without perforation or abscess without bleeding 07/21/2013    Diverticulitis of colon    Dry eye syndrome of unspecified lacrimal gland 10/18/2022    Dry eye syndrome    Encounter for follow-up examination after completed treatment for conditions other than malignant neoplasm 11/18/2015    Hospital discharge follow-up    Encounter for immunization 02/20/2016    Immunization due    Encounter for other preprocedural examination 10/09/2015    Pre-operative clearance    Encounter for screening for infections with a predominantly sexual mode of transmission 03/16/2018    Screening for STD (sexually transmitted disease)    Encounter for screening for infections with a predominantly sexual mode of transmission 02/28/2018    Screening for STD (sexually transmitted disease)    Encounter for screening for infections with a predominantly sexual mode of transmission 02/28/2018    Screening for STD (sexually transmitted disease)    Encounter for screening for malignant neoplasm of colon 11/23/2020    Encounter for screening colonoscopy    Excessive and frequent menstruation with irregular cycle 07/21/2013    Metrorrhagia    Facial cellulitis 05/17/2023    Follicular disorder,  unspecified 04/02/2021    GERD (gastroesophageal reflux disease)     Headache     Hemorrhage of anus and rectum 05/09/2014    Rectal hemorrhage    Hot flashes, menopausal 04/06/2023    Hyperlipidemia, unspecified 07/21/2013    Hyperlipidemia    Hypertension     Impetigo, unspecified 04/02/2021    Incarcerated umbilical hernia 04/06/2023    Surgery 4/1/23. Patient doing well. Dr. Tawanda Munoz.     Inflamed seborrheic keratosis 04/02/2021    Lateral epicondylitis, unspecified elbow 03/16/2018    Tennis elbow    Lateral epicondylitis, unspecified elbow 02/28/2018    Tennis elbow    Lateral epicondylitis, unspecified elbow 02/28/2018    Tennis elbow    Left sided colitis without complications (Multi) 07/21/2013    Ulcerative colitis, left sided    Lumbosacral spondylosis 04/06/2023    Myopia, bilateral 10/18/2022    Myopia of both eyes with astigmatism and presbyopia    Nevus of left conjunctiva 04/06/2023    Obstructive sleep apnea (adult) (pediatric) 07/21/2013    Obstructive sleep apnea    Other chest pain 05/08/2019    Chest tightness    Other conditions influencing health status 12/28/2020    Colonoscopy planned    Other conditions influencing health status 05/08/2019    History of cough    Other enthesopathies, not elsewhere classified 05/06/2014    Tendinitis of right shoulder    Other hypersomnia 11/08/2017    Excessive daytime sleepiness    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    Protein S deficiency    Other specified abnormal findings of blood chemistry 10/07/2016    Elevated serum creatinine    Other symptoms and signs involving the genitourinary system 11/23/2020    Abnormal urogenital discharge    Other symptoms and signs involving the nervous system 04/13/2017    Suspected sleep apnea    Personal history of diseases of the blood and blood-forming organs and certain disorders involving the immune mechanism 10/18/2022    History of sarcoidosis    Personal history of diseases of the skin and subcutaneous  tissue     History of dermatitis    Personal history of other diseases of the circulatory system 07/10/2020    History of congestive heart failure    Personal history of other diseases of the female genital tract 10/26/2020    History of abnormal uterine bleeding    Personal history of other diseases of the musculoskeletal system and connective tissue 01/16/2018    History of lateral epicondylitis    Personal history of other diseases of the respiratory system 07/21/2013    History of acute bronchitis    Personal history of other diseases of the respiratory system 04/14/2014    Personal history of sinusitis    Personal history of other diseases of urinary system 02/02/2022    History of hematuria    Personal history of other diseases of urinary system 11/01/2021    History of hematuria    Personal history of other infectious and parasitic diseases 04/14/2014    History of trichomoniasis    Personal history of other infectious and parasitic diseases     History of herpes simplex infection    Personal history of other specified conditions 07/21/2020    History of chest pain    Personal history of other specified conditions 01/18/2017    History of dysuria    Personal history of other specified conditions 11/23/2020    History of diarrhea    Personal history of other specified conditions 07/14/2022    History of itching    Personal history of other specified conditions 10/14/2020    History of dyspnea    Personal history of other specified conditions 06/18/2016    History of excessive sweating    Personal history of other specified conditions 07/26/2016    History of diarrhea    Personal history of other specified conditions 06/18/2016    History of nausea    Personal history of peptic ulcer disease     History of peptic ulcer    Personal history of pulmonary embolism 08/21/2020    History of pulmonary embolism    Personal history of urinary (tract) infections 11/05/2015    History of urinary tract infection     Piriformis syndrome of right side 2023    Postnasal drip 2014    Post-nasal drainage    Primary osteoarthritis of first carpometacarpal joint of left hand 2023    Rash and other nonspecific skin eruption 2022    Rash    Right upper quadrant abdominal tenderness 2017    Abdominal tenderness, right upper quadrant    Spondylolisthesis, acquired 2023    Type 2 diabetes mellitus 2022    Formatting of this note might be different from the original. Comment on above: Added by Problem List Migration; 2013; Moved to Suppressed 2013 4:10PM;    Type 2 diabetes mellitus without complication, without long-term current use of insulin 2023    Ulcerative blepharitis unspecified eye, unspecified eyelid 2018    Ulcerative blepharitis    Ulnar neuropathy at elbow of right upper extremity 2023    Unspecified exophthalmos 2013    Exophthalmos    Unspecified exophthalmos 10/18/2022    Ocular proptosis    Urinary tract infection, site not specified 2014    Acute UTI    Vascular disorder of intestine, unspecified 2013    Ischemic colitis   [2]   Past Surgical History:  Procedure Laterality Date    CARDIAC CATHETERIZATION N/A 2024    Procedure: Left Heart Cath, With LV;  Surgeon: Paul He MD;  Location: [facility];  Service: Cardiovascular;  Laterality: N/A;    CARDIAC CATHETERIZATION N/A 2024    Procedure: PCI ETHAN Stent- Coronary;  Surgeon: Paul He MD;  Location: [facility];  Service: Cardiovascular;  Laterality: N/A;    CARDIAC CATHETERIZATION N/A 2024    Procedure: IVUS - Coronary;  Surgeon: Paul He MD;  Location: [facility];  Service: Cardiovascular;  Laterality: N/A;     SECTION, LOW TRANSVERSE      COLON SURGERY      CT ABDOMEN PELVIS ANGIOGRAM W AND/OR WO IV CONTRAST  2015    CT ABDOMEN PELVIS ANGIOGRAM W AND/OR WO IV CONTRAST 2015 [facility]    CT  ABDOMEN PELVIS ANGIOGRAM W AND/OR WO IV CONTRAST  2022    CT ABDOMEN PELVIS ANGIOGRAM W AND/OR WO IV CONTRAST 2022 DOCTOR OFFICE LEGACY    CT ABDOMEN PELVIS ANGIOGRAM W AND/OR WO IV CONTRAST  2023    CT ABDOMEN PELVIS ANGIOGRAM W AND/OR WO IV CONTRAST [facility]    HYSTEROSCOPY  2014    Hysteroscopy With Endometrial Ablation    OTHER SURGICAL HISTORY  2014    Left Hemicolectomy    TONSILLECTOMY  2014    Tonsillectomy    TOTAL VAGINAL HYSTERECTOMY  2014    Vaginal Hysterectomy    TUBAL LIGATION  2014    Tubal Ligation   [3]   Family History  Problem Relation Name Age of Onset    Heart disease Mother HCM     Kidney failure Father      Cirrhosis Sister      Heart disease Daughter Hcm     Breast cancer Neg Hx     [4]   Social History  Tobacco Use    Smoking status: Former     Current packs/day: 0.00     Average packs/day: 0.3 packs/day for 4.0 years (1.0 ttl pk-yrs)     Types: Cigarettes     Start date: 1990     Quit date:      Years since quittin.6     Passive exposure: Past    Smokeless tobacco: Never   Vaping Use    Vaping status: Never Used   Substance Use Topics    Alcohol use: Yes     Alcohol/week: 2.0 standard drinks of alcohol     Types: 2 Glasses of wine per week     Comment: twice a month    Drug use: Never        CEDRIC Duran-CNP  [id number]

## 2025-08-02 LAB
BACTERIA UR CULT: NORMAL
HOLD SPECIMEN: NORMAL

## 2025-08-05 DIAGNOSIS — I10 HYPERTENSION, UNSPECIFIED TYPE: ICD-10-CM

## 2025-08-06 ENCOUNTER — OFFICE VISIT (OUTPATIENT)
Dept: PRIMARY CARE | Facility: CLINIC | Age: 65
End: 2025-08-06
Payer: MEDICARE

## 2025-08-06 VITALS
WEIGHT: 193 LBS | TEMPERATURE: 97.7 F | DIASTOLIC BLOOD PRESSURE: 74 MMHG | HEART RATE: 68 BPM | BODY MASS INDEX: 36.47 KG/M2 | RESPIRATION RATE: 18 BRPM | SYSTOLIC BLOOD PRESSURE: 118 MMHG | OXYGEN SATURATION: 100 %

## 2025-08-06 DIAGNOSIS — I10 HYPERTENSION, UNSPECIFIED TYPE: ICD-10-CM

## 2025-08-06 PROCEDURE — 3074F SYST BP LT 130 MM HG: CPT | Performed by: INTERNAL MEDICINE

## 2025-08-06 PROCEDURE — 99214 OFFICE O/P EST MOD 30 MIN: CPT | Performed by: INTERNAL MEDICINE

## 2025-08-06 PROCEDURE — 3078F DIAST BP <80 MM HG: CPT | Performed by: INTERNAL MEDICINE

## 2025-08-06 RX ORDER — AMLODIPINE BESYLATE 10 MG/1
10 TABLET ORAL DAILY
Qty: 90 TABLET | Refills: 0 | Status: SHIPPED | OUTPATIENT
Start: 2025-08-06 | End: 2025-11-04

## 2025-08-06 RX ORDER — AMLODIPINE BESYLATE 10 MG/1
10 TABLET ORAL DAILY
Qty: 90 TABLET | Refills: 3 | Status: SHIPPED | OUTPATIENT
Start: 2025-08-06 | End: 2026-08-06

## 2025-08-06 ASSESSMENT — ENCOUNTER SYMPTOMS
AGITATION: 0
NUMBNESS: 0
ADENOPATHY: 0
ARTHRALGIAS: 0
SHORTNESS OF BREATH: 0
CONSTIPATION: 0
WEAKNESS: 0
WHEEZING: 0
PALPITATIONS: 0
BLOOD IN STOOL: 0
BACK PAIN: 0
FATIGUE: 0
ACTIVITY CHANGE: 0
JOINT SWELLING: 0
ALLERGIC/IMMUNOLOGIC NEGATIVE: 1
HEADACHES: 0
COUGH: 0
DYSURIA: 0
FREQUENCY: 0
ENDOCRINE NEGATIVE: 1
DIZZINESS: 0
FEVER: 0
ABDOMINAL PAIN: 0
EYE REDNESS: 0

## 2025-08-06 NOTE — PROGRESS NOTES
Subjective   Patient ID: Marni Lugo is a 64 y.o. female who presents for Thrombophilia (Right leg blood clot - ER 05/04/25).  Prior PCP Dr Salvador    HPI   Medical History:   64 y.o. woman with history of DVT/PE, mesenteric thrombosis, initially maintained on long-term anticoagulation with warfarin, later transitioned to apixaban (by her primary).    Prior  ED visit on 5/04/25, had venous doppler study : She missed few doses fo Eliquis   IMPRESSION:  Occlusive thrombus involving proximal segment of the right posterior  tibial vein. No sonographic evidence for deep venous thrombosis in  the remainder of the visualized portions of the  right lower  extremity deep venous system.       She denies bleeding including epistaxis, gingival bleeding, hemoptysis, hematemesis, hematochezia, melena, hematuria, and vaginal bleeding.  She strained her low back was seen in ED on 8/01, she is going to see Ortho soon, took muscle relaxer and pain med.    Also with HFpEF, CKD3, remote bladder cancer, sarcoidosis.         Surgical History:   Surgical History         Past Surgical History:   Procedure Laterality Date    CARDIAC CATHETERIZATION N/A 8/19/2024     Procedure: Left Heart Cath, With LV;  Surgeon: Paul He MD;  Location: [facility];  Service: Cardiovascular;  Laterality: N/A;    CARDIAC CATHETERIZATION N/A 8/19/2024     Procedure: PCI ETHAN Stent- Coronary;  Surgeon: Paul He MD;  Location[facility];  Service: Cardiovascular;  Laterality: N/A;    CARDIAC CATHETERIZATION N/A 8/19/2024     Procedure: IVUS - Coronary;  Surgeon: Paul He MD;  Location[facility];  Service: Cardiovascular;  Laterality: N/A;    CT ABDOMEN PELVIS ANGIOGRAM W AND/OR WO IV CONTRAST   11/12/2015     CT ABDOMEN PELVIS ANGIOGRAM W AND/OR WO IV CONTRAST [facility]    CT ABDOMEN PELVIS ANGIOGRAM W AND/OR WO IV CONTRAST   5/8/2022     CT ABDOMEN PELVIS ANGIOGRAM W AND/OR WO IV  CONTRAST 2022 DOCTOR OFFICE LEGACY    CT ABDOMEN PELVIS ANGIOGRAM W AND/OR WO IV CONTRAST   2023     CT ABDOMEN PELVIS ANGIOGRAM W AND/OR WO IV CONTRAST [facility] CT    HYSTEROSCOPY   2014     Hysteroscopy With Endometrial Ablation    OTHER SURGICAL HISTORY   2014     Left Hemicolectomy    TONSILLECTOMY   2014     Tonsillectomy    TOTAL VAGINAL HYSTERECTOMY   2014     Vaginal Hysterectomy    TUBAL LIGATION   2014     Tubal Ligation      PSHP@  Social History:   Social History            Tobacco Use    Smoking status: Former       Current packs/day: 0.00       Average packs/day: 0.3 packs/day for 4.0 years (1.0 ttl pk-yrs)       Types: Cigarettes       Start date:        Quit date:        Years since quittin.0       Passive exposure: Past    Smokeless tobacco: Never   Vaping Use    Vaping status: Never Used   Substance Use Topics    Alcohol use: Yes       Alcohol/week: 2.0 standard drinks of alcohol       Types: 2 Glasses of wine per week       Comment: twice a month    Drug use: Never      Family History  Family History[]Expand by Default          Family History   Problem Relation Name Age of Onset    Heart disease Mother        Kidney failure Father        Cirrhosis Sister                      Allergies:  Adhesive and Latex   Review of Systems   Constitutional:  Negative for activity change, fatigue and fever.   HENT:  Negative for congestion.    Eyes:  Negative for redness and visual disturbance.   Respiratory:  Negative for cough, shortness of breath and wheezing.    Cardiovascular:  Negative for chest pain, palpitations and leg swelling.   Gastrointestinal:  Negative for abdominal pain, blood in stool and constipation.   Endocrine: Negative.    Genitourinary:  Negative for dysuria, frequency and urgency.   Musculoskeletal:  Negative for arthralgias, back pain and joint swelling.   Skin:  Negative for rash.        Left breast quarter size painful mass    Allergic/Immunologic: Negative.    Neurological:  Negative for dizziness, weakness, numbness and headaches.   Hematological:  Negative for adenopathy.   Psychiatric/Behavioral:  Negative for agitation and behavioral problems.        Objective   /74 (BP Location: Left arm, Patient Position: Sitting, BP Cuff Size: Adult)   Pulse 68   Temp 36.5 °C (97.7 °F) (Temporal)   Resp 18   Wt 87.5 kg (193 lb)   SpO2 100%   BMI 36.47 kg/m²     Physical Exam  Constitutional:       Appearance: Normal appearance. She is obese.   HENT:      Head: Normocephalic and atraumatic.      Right Ear: Tympanic membrane and external ear normal.      Left Ear: Tympanic membrane and external ear normal.      Nose: No congestion.      Mouth/Throat:      Mouth: Mucous membranes are moist.      Pharynx: Oropharynx is clear.     Eyes:      Extraocular Movements: Extraocular movements intact.      Conjunctiva/sclera: Conjunctivae normal.      Pupils: Pupils are equal, round, and reactive to light.       Cardiovascular:      Rate and Rhythm: Normal rate and regular rhythm.      Pulses: Normal pulses.      Heart sounds: Normal heart sounds. No murmur heard.  Pulmonary:      Effort: Pulmonary effort is normal.      Breath sounds: Normal breath sounds. No wheezing or rales.   Abdominal:      General: Abdomen is flat. Bowel sounds are normal.      Palpations: Abdomen is soft.      Hernia: No hernia is present.     Musculoskeletal:         General: No swelling or tenderness. Normal range of motion.      Cervical back: Normal range of motion and neck supple.      Right lower leg: No edema.      Left lower leg: No edema.      Comments: She wears knee high stockings     Skin:     General: Skin is warm and dry.      Capillary Refill: Capillary refill takes less than 2 seconds.      Findings: No rash.     Neurological:      General: No focal deficit present.      Mental Status: She is alert and oriented to person, place, and time.     Psychiatric:          Mood and Affect: Mood normal.         Behavior: Behavior normal.         Assessment/Plan     Low back pain, lumbar strain:  Rest, Heat , analgesics  PT, stretching exercise, massage and water therapy may help  She has appt with Ortho soon       Antiphospholipid antibody syndrome - is a wrong Dc , will remove ( see Cardio note )  Has h/o DVT and PE , recent US doppler positive for DVT right leg  She will d/w her hematologist   Consider another OAC and /or  IVC filter   Continue Eliquis 5 mg bid , compliance is encouraged     CAD status post PCI  HFpEF/diastolic dysfunction  Recurrent VTE  Hyperlipidemia  Essential hypertension     Type 2 DM with microalbuminuria- On Jardiance 25 mg, She is on Rosuvastatin, Amlodipine 10 mg daily,      Has CHF, seeing Dr Paul He . She is doing ok on Furosemide. No shortness of breath. Also on it for venous insufficiency. Using compression socks. On Sildenafil, but is for Raynauds disease of fingers.        HTN :   Amlodipine, Metoprolol     OAB :   Gemtesa 75 mg daily    RLS :  Sinemet daily , per neurology      Headaches :   Topamax   Follows with Neurology      Hyperlipidemia - on Rosuvastatin 40 mg daily. No side effects. Eating not good per patient. Currently is staying with daughter to help her during divorce.      CKD3 - saw nephrology, Dr Deluca  US done . Has labs ordered.       [facility]  Flushot, covid booster at pharmacy  Prevnar 20 , shingrix, RSV at pharmacy

## 2025-08-08 ENCOUNTER — APPOINTMENT (OUTPATIENT)
Dept: ORTHOPEDIC SURGERY | Facility: CLINIC | Age: 65
End: 2025-08-08
Payer: MEDICARE

## 2025-08-08 DIAGNOSIS — M54.16 LUMBAR RADICULOPATHY: ICD-10-CM

## 2025-08-08 PROCEDURE — 99203 OFFICE O/P NEW LOW 30 MIN: CPT | Performed by: ORTHOPAEDIC SURGERY

## 2025-08-15 ENCOUNTER — TELEPHONE (OUTPATIENT)
Dept: CARDIOLOGY | Facility: HOSPITAL | Age: 65
End: 2025-08-15
Payer: MEDICARE

## 2025-08-17 PROCEDURE — 99284 EMERGENCY DEPT VISIT MOD MDM: CPT | Performed by: EMERGENCY MEDICINE

## 2025-08-17 ASSESSMENT — LIFESTYLE VARIABLES
HAVE YOU EVER FELT YOU SHOULD CUT DOWN ON YOUR DRINKING: NO
HAVE PEOPLE ANNOYED YOU BY CRITICIZING YOUR DRINKING: NO
EVER FELT BAD OR GUILTY ABOUT YOUR DRINKING: NO
EVER HAD A DRINK FIRST THING IN THE MORNING TO STEADY YOUR NERVES TO GET RID OF A HANGOVER: NO
TOTAL SCORE: 0

## 2025-08-17 ASSESSMENT — PAIN - FUNCTIONAL ASSESSMENT: PAIN_FUNCTIONAL_ASSESSMENT: 0-10

## 2025-08-17 ASSESSMENT — PAIN DESCRIPTION - PAIN TYPE: TYPE: ACUTE PAIN

## 2025-08-17 ASSESSMENT — PAIN SCALES - GENERAL: PAINLEVEL_OUTOF10: 7

## 2025-08-18 ENCOUNTER — APPOINTMENT (OUTPATIENT)
Dept: RADIOLOGY | Facility: HOSPITAL | Age: 65
End: 2025-08-18
Payer: MEDICARE

## 2025-08-18 ENCOUNTER — HOSPITAL ENCOUNTER (EMERGENCY)
Facility: HOSPITAL | Age: 65
Discharge: HOME | End: 2025-08-18
Attending: EMERGENCY MEDICINE
Payer: MEDICARE

## 2025-08-18 VITALS
SYSTOLIC BLOOD PRESSURE: 138 MMHG | WEIGHT: 180 LBS | BODY MASS INDEX: 33.99 KG/M2 | HEIGHT: 61 IN | RESPIRATION RATE: 16 BRPM | HEART RATE: 82 BPM | TEMPERATURE: 98.2 F | DIASTOLIC BLOOD PRESSURE: 84 MMHG | OXYGEN SATURATION: 99 %

## 2025-08-18 DIAGNOSIS — R31.9 HEMATURIA, UNSPECIFIED TYPE: Primary | ICD-10-CM

## 2025-08-18 LAB
ALBUMIN SERPL BCP-MCNC: 4 G/DL (ref 3.4–5)
ALP SERPL-CCNC: 76 U/L (ref 33–136)
ALT SERPL W P-5'-P-CCNC: 16 U/L (ref 7–45)
ANION GAP SERPL CALC-SCNC: 10 MMOL/L (ref 10–20)
APPEARANCE UR: CLEAR
AST SERPL W P-5'-P-CCNC: 14 U/L (ref 9–39)
BASOPHILS # BLD AUTO: 0.04 X10*3/UL (ref 0–0.1)
BASOPHILS NFR BLD AUTO: 0.6 %
BILIRUB SERPL-MCNC: 0.5 MG/DL (ref 0–1.2)
BILIRUB UR STRIP.AUTO-MCNC: NEGATIVE MG/DL
BUN SERPL-MCNC: 10 MG/DL (ref 6–23)
CALCIUM SERPL-MCNC: 8.9 MG/DL (ref 8.6–10.3)
CHLORIDE SERPL-SCNC: 115 MMOL/L (ref 98–107)
CO2 SERPL-SCNC: 22 MMOL/L (ref 21–32)
COLOR UR: ABNORMAL
CREAT SERPL-MCNC: 1.18 MG/DL (ref 0.5–1.05)
EGFRCR SERPLBLD CKD-EPI 2021: 52 ML/MIN/1.73M*2
EOSINOPHIL # BLD AUTO: 0.05 X10*3/UL (ref 0–0.7)
EOSINOPHIL NFR BLD AUTO: 0.7 %
ERYTHROCYTE [DISTWIDTH] IN BLOOD BY AUTOMATED COUNT: 15.1 % (ref 11.5–14.5)
GLUCOSE SERPL-MCNC: 108 MG/DL (ref 74–99)
GLUCOSE UR STRIP.AUTO-MCNC: ABNORMAL MG/DL
HCT VFR BLD AUTO: 41.6 % (ref 36–46)
HGB BLD-MCNC: 13.9 G/DL (ref 12–16)
IMM GRANULOCYTES # BLD AUTO: 0.01 X10*3/UL (ref 0–0.7)
IMM GRANULOCYTES NFR BLD AUTO: 0.1 % (ref 0–0.9)
INR PPP: 1.1 (ref 0.9–1.1)
KETONES UR STRIP.AUTO-MCNC: NEGATIVE MG/DL
LEUKOCYTE ESTERASE UR QL STRIP.AUTO: ABNORMAL
LYMPHOCYTES # BLD AUTO: 2.73 X10*3/UL (ref 1.2–4.8)
LYMPHOCYTES NFR BLD AUTO: 37.6 %
MCH RBC QN AUTO: 29.1 PG (ref 26–34)
MCHC RBC AUTO-ENTMCNC: 33.4 G/DL (ref 32–36)
MCV RBC AUTO: 87 FL (ref 80–100)
MONOCYTES # BLD AUTO: 0.86 X10*3/UL (ref 0.1–1)
MONOCYTES NFR BLD AUTO: 11.8 %
MUCOUS THREADS #/AREA URNS AUTO: ABNORMAL /LPF
NEUTROPHILS # BLD AUTO: 3.57 X10*3/UL (ref 1.2–7.7)
NEUTROPHILS NFR BLD AUTO: 49.2 %
NITRITE UR QL STRIP.AUTO: NEGATIVE
NRBC BLD-RTO: 0 /100 WBCS (ref 0–0)
PH UR STRIP.AUTO: 5.5 [PH]
PLATELET # BLD AUTO: 262 X10*3/UL (ref 150–450)
POTASSIUM SERPL-SCNC: 3.5 MMOL/L (ref 3.5–5.3)
PROT SERPL-MCNC: 6.5 G/DL (ref 6.4–8.2)
PROT UR STRIP.AUTO-MCNC: NEGATIVE MG/DL
PROTHROMBIN TIME: 12.1 SECONDS (ref 9.8–12.4)
RBC # BLD AUTO: 4.78 X10*6/UL (ref 4–5.2)
RBC # UR STRIP.AUTO: ABNORMAL MG/DL
RBC #/AREA URNS AUTO: ABNORMAL /HPF
SODIUM SERPL-SCNC: 143 MMOL/L (ref 136–145)
SP GR UR STRIP.AUTO: 1.01
SQUAMOUS #/AREA URNS AUTO: ABNORMAL /HPF
UROBILINOGEN UR STRIP.AUTO-MCNC: NORMAL MG/DL
WBC # BLD AUTO: 7.3 X10*3/UL (ref 4.4–11.3)
WBC #/AREA URNS AUTO: ABNORMAL /HPF
WBC CLUMPS #/AREA URNS AUTO: ABNORMAL /HPF

## 2025-08-18 PROCEDURE — 81001 URINALYSIS AUTO W/SCOPE: CPT | Performed by: EMERGENCY MEDICINE

## 2025-08-18 PROCEDURE — 87086 URINE CULTURE/COLONY COUNT: CPT | Mod: PORLAB | Performed by: EMERGENCY MEDICINE

## 2025-08-18 PROCEDURE — 85025 COMPLETE CBC W/AUTO DIFF WBC: CPT | Performed by: EMERGENCY MEDICINE

## 2025-08-18 PROCEDURE — 36415 COLL VENOUS BLD VENIPUNCTURE: CPT | Performed by: EMERGENCY MEDICINE

## 2025-08-18 PROCEDURE — 2500000004 HC RX 250 GENERAL PHARMACY W/ HCPCS (ALT 636 FOR OP/ED): Performed by: EMERGENCY MEDICINE

## 2025-08-18 PROCEDURE — 85610 PROTHROMBIN TIME: CPT | Performed by: EMERGENCY MEDICINE

## 2025-08-18 PROCEDURE — 84075 ASSAY ALKALINE PHOSPHATASE: CPT | Performed by: EMERGENCY MEDICINE

## 2025-08-18 PROCEDURE — 74176 CT ABD & PELVIS W/O CONTRAST: CPT

## 2025-08-18 PROCEDURE — 2500000001 HC RX 250 WO HCPCS SELF ADMINISTERED DRUGS (ALT 637 FOR MEDICARE OP): Performed by: EMERGENCY MEDICINE

## 2025-08-18 PROCEDURE — 74176 CT ABD & PELVIS W/O CONTRAST: CPT | Performed by: RADIOLOGY

## 2025-08-18 PROCEDURE — 96374 THER/PROPH/DIAG INJ IV PUSH: CPT

## 2025-08-18 RX ORDER — ONDANSETRON HYDROCHLORIDE 2 MG/ML
4 INJECTION, SOLUTION INTRAVENOUS ONCE
Status: COMPLETED | OUTPATIENT
Start: 2025-08-18 | End: 2025-08-18

## 2025-08-18 RX ORDER — CEPHALEXIN 250 MG/1
500 CAPSULE ORAL ONCE
Status: COMPLETED | OUTPATIENT
Start: 2025-08-18 | End: 2025-08-18

## 2025-08-18 RX ORDER — CEPHALEXIN 500 MG/1
500 CAPSULE ORAL 3 TIMES DAILY
Qty: 30 CAPSULE | Refills: 0 | Status: SHIPPED | OUTPATIENT
Start: 2025-08-18 | End: 2025-08-28

## 2025-08-18 RX ORDER — ONDANSETRON 4 MG/1
4 TABLET, ORALLY DISINTEGRATING ORAL EVERY 8 HOURS PRN
Qty: 20 TABLET | Refills: 0 | Status: SHIPPED | OUTPATIENT
Start: 2025-08-18

## 2025-08-18 RX ADMIN — CEPHALEXIN 500 MG: 250 CAPSULE ORAL at 03:26

## 2025-08-18 RX ADMIN — ONDANSETRON 4 MG: 2 INJECTION, SOLUTION INTRAMUSCULAR; INTRAVENOUS at 00:53

## 2025-08-19 ENCOUNTER — TELEPHONE (OUTPATIENT)
Dept: CARDIOLOGY | Facility: HOSPITAL | Age: 65
End: 2025-08-19

## 2025-08-19 ENCOUNTER — APPOINTMENT (OUTPATIENT)
Dept: ORTHOPEDIC SURGERY | Facility: CLINIC | Age: 65
End: 2025-08-19
Payer: MEDICARE

## 2025-08-19 ENCOUNTER — APPOINTMENT (OUTPATIENT)
Dept: CARDIOLOGY | Facility: CLINIC | Age: 65
End: 2025-08-19
Payer: MEDICARE

## 2025-08-19 VITALS
BODY MASS INDEX: 33.99 KG/M2 | HEIGHT: 61 IN | DIASTOLIC BLOOD PRESSURE: 82 MMHG | HEART RATE: 66 BPM | WEIGHT: 180 LBS | OXYGEN SATURATION: 95 % | SYSTOLIC BLOOD PRESSURE: 132 MMHG

## 2025-08-19 DIAGNOSIS — I20.0 WORSENING ANGINA: ICD-10-CM

## 2025-08-19 DIAGNOSIS — I25.118 CORONARY ARTERY DISEASE WITH OTHER FORM OF ANGINA PECTORIS, UNSPECIFIED VESSEL OR LESION TYPE, UNSPECIFIED WHETHER NATIVE OR TRANSPLANTED HEART: Primary | ICD-10-CM

## 2025-08-19 DIAGNOSIS — E78.2 MIXED HYPERLIPIDEMIA: ICD-10-CM

## 2025-08-19 DIAGNOSIS — I50.32 CHRONIC DIASTOLIC CONGESTIVE HEART FAILURE: ICD-10-CM

## 2025-08-19 DIAGNOSIS — I10 HYPERTENSION, UNSPECIFIED TYPE: ICD-10-CM

## 2025-08-19 DIAGNOSIS — Z95.5 S/P CORONARY ARTERY STENT PLACEMENT: ICD-10-CM

## 2025-08-19 PROBLEM — I25.10 CORONARY ARTERY DISEASE: Status: ACTIVE | Noted: 2025-08-19

## 2025-08-19 LAB
BACTERIA UR CULT: NORMAL
HOLD SPECIMEN: NORMAL

## 2025-08-19 PROCEDURE — 3075F SYST BP GE 130 - 139MM HG: CPT | Performed by: STUDENT IN AN ORGANIZED HEALTH CARE EDUCATION/TRAINING PROGRAM

## 2025-08-19 PROCEDURE — 3008F BODY MASS INDEX DOCD: CPT | Performed by: STUDENT IN AN ORGANIZED HEALTH CARE EDUCATION/TRAINING PROGRAM

## 2025-08-19 PROCEDURE — 93000 ELECTROCARDIOGRAM COMPLETE: CPT | Performed by: STUDENT IN AN ORGANIZED HEALTH CARE EDUCATION/TRAINING PROGRAM

## 2025-08-19 PROCEDURE — 3079F DIAST BP 80-89 MM HG: CPT | Performed by: STUDENT IN AN ORGANIZED HEALTH CARE EDUCATION/TRAINING PROGRAM

## 2025-08-19 PROCEDURE — 99215 OFFICE O/P EST HI 40 MIN: CPT | Performed by: STUDENT IN AN ORGANIZED HEALTH CARE EDUCATION/TRAINING PROGRAM

## 2025-08-19 RX ORDER — CLOPIDOGREL BISULFATE 75 MG/1
75 TABLET ORAL DAILY
Qty: 90 TABLET | Refills: 3 | Status: SHIPPED | OUTPATIENT
Start: 2025-08-19 | End: 2026-08-19

## 2025-08-19 RX ORDER — METOPROLOL TARTRATE 25 MG/1
25 TABLET, FILM COATED ORAL 2 TIMES DAILY
Qty: 60 TABLET | Refills: 11 | Status: SHIPPED | OUTPATIENT
Start: 2025-08-19 | End: 2026-08-19

## 2025-08-19 RX ORDER — ISOSORBIDE MONONITRATE 30 MG/1
30 TABLET, EXTENDED RELEASE ORAL DAILY
Qty: 30 TABLET | Refills: 11 | Status: SHIPPED | OUTPATIENT
Start: 2025-08-19 | End: 2026-08-19

## 2025-08-19 RX ORDER — AMLODIPINE BESYLATE 10 MG/1
10 TABLET ORAL DAILY
Qty: 90 TABLET | Refills: 3 | Status: SHIPPED | OUTPATIENT
Start: 2025-08-19 | End: 2026-08-19

## 2025-08-19 RX ORDER — ROSUVASTATIN CALCIUM 40 MG/1
40 TABLET, COATED ORAL DAILY
Qty: 90 TABLET | Refills: 3 | Status: SHIPPED | OUTPATIENT
Start: 2025-08-19 | End: 2026-08-19

## 2025-08-20 ENCOUNTER — TELEPHONE (OUTPATIENT)
Dept: CARDIOLOGY | Facility: CLINIC | Age: 65
End: 2025-08-20
Payer: MEDICARE

## 2025-08-21 ENCOUNTER — APPOINTMENT (OUTPATIENT)
Dept: OPHTHALMOLOGY | Age: 65
End: 2025-08-21
Payer: MEDICARE

## 2025-08-21 DIAGNOSIS — K21.9 GASTROESOPHAGEAL REFLUX DISEASE, UNSPECIFIED WHETHER ESOPHAGITIS PRESENT: ICD-10-CM

## 2025-08-21 RX ORDER — PANTOPRAZOLE SODIUM 40 MG/1
40 TABLET, DELAYED RELEASE ORAL DAILY
Qty: 30 TABLET | Refills: 0 | Status: SHIPPED | OUTPATIENT
Start: 2025-08-21

## 2025-08-26 ENCOUNTER — TELEPHONE (OUTPATIENT)
Dept: CARDIOLOGY | Facility: HOSPITAL | Age: 65
End: 2025-08-26
Payer: MEDICARE

## 2025-08-26 ENCOUNTER — DOCUMENTATION (OUTPATIENT)
Dept: PHYSICAL THERAPY | Facility: CLINIC | Age: 65
End: 2025-08-26
Payer: MEDICARE

## 2025-08-28 ENCOUNTER — RESULTS FOLLOW-UP (OUTPATIENT)
Dept: CARDIOLOGY | Facility: HOSPITAL | Age: 65
End: 2025-08-28
Payer: MEDICARE

## 2025-08-28 LAB
CHOLEST SERPL-MCNC: 181 MG/DL
CHOLEST/HDLC SERPL: 3.6 (CALC)
HDLC SERPL-MCNC: 50 MG/DL
LDLC SERPL CALC-MCNC: 92 MG/DL (CALC)
NONHDLC SERPL-MCNC: 131 MG/DL (CALC)
TRIGL SERPL-MCNC: 295 MG/DL

## 2025-09-04 ENCOUNTER — HOSPITAL ENCOUNTER (EMERGENCY)
Facility: HOSPITAL | Age: 65
Discharge: HOME | End: 2025-09-05
Attending: EMERGENCY MEDICINE
Payer: MEDICARE

## 2025-09-04 ENCOUNTER — APPOINTMENT (OUTPATIENT)
Dept: RADIOLOGY | Facility: HOSPITAL | Age: 65
End: 2025-09-04
Payer: MEDICARE

## 2025-09-04 ENCOUNTER — APPOINTMENT (OUTPATIENT)
Dept: NEUROLOGY | Facility: CLINIC | Age: 65
End: 2025-09-04
Payer: MEDICARE

## 2025-09-04 DIAGNOSIS — R05.1 ACUTE COUGH: ICD-10-CM

## 2025-09-04 DIAGNOSIS — J06.9 UPPER RESPIRATORY TRACT INFECTION, UNSPECIFIED TYPE: Primary | ICD-10-CM

## 2025-09-04 PROCEDURE — 2500000002 HC RX 250 W HCPCS SELF ADMINISTERED DRUGS (ALT 637 FOR MEDICARE OP, ALT 636 FOR OP/ED): Performed by: EMERGENCY MEDICINE

## 2025-09-04 PROCEDURE — 87637 SARSCOV2&INF A&B&RSV AMP PRB: CPT | Performed by: EMERGENCY MEDICINE

## 2025-09-04 PROCEDURE — 94640 AIRWAY INHALATION TREATMENT: CPT

## 2025-09-04 PROCEDURE — 71046 X-RAY EXAM CHEST 2 VIEWS: CPT | Mod: FOREIGN READ | Performed by: RADIOLOGY

## 2025-09-04 PROCEDURE — 71046 X-RAY EXAM CHEST 2 VIEWS: CPT

## 2025-09-04 PROCEDURE — 99284 EMERGENCY DEPT VISIT MOD MDM: CPT | Mod: 25 | Performed by: EMERGENCY MEDICINE

## 2025-09-04 PROCEDURE — 2500000004 HC RX 250 GENERAL PHARMACY W/ HCPCS (ALT 636 FOR OP/ED): Mod: JW | Performed by: EMERGENCY MEDICINE

## 2025-09-04 PROCEDURE — 96372 THER/PROPH/DIAG INJ SC/IM: CPT | Performed by: EMERGENCY MEDICINE

## 2025-09-04 RX ORDER — IPRATROPIUM BROMIDE AND ALBUTEROL SULFATE 2.5; .5 MG/3ML; MG/3ML
3 SOLUTION RESPIRATORY (INHALATION) ONCE
Status: COMPLETED | OUTPATIENT
Start: 2025-09-04 | End: 2025-09-04

## 2025-09-04 RX ORDER — ALBUTEROL SULFATE 90 UG/1
2 INHALANT RESPIRATORY (INHALATION) EVERY 4 HOURS PRN
Qty: 18 G | Refills: 0 | Status: SHIPPED | OUTPATIENT
Start: 2025-09-04 | End: 2025-10-04

## 2025-09-04 RX ORDER — PREDNISONE 20 MG/1
40 TABLET ORAL DAILY
Qty: 10 TABLET | Refills: 0 | Status: SHIPPED | OUTPATIENT
Start: 2025-09-04 | End: 2025-09-09

## 2025-09-04 RX ADMIN — DEXAMETHASONE SODIUM PHOSPHATE 10 MG: 4 INJECTION, SOLUTION INTRAMUSCULAR; INTRAVENOUS at 21:46

## 2025-09-04 RX ADMIN — IPRATROPIUM BROMIDE AND ALBUTEROL SULFATE 3 ML: 2.5; .5 SOLUTION RESPIRATORY (INHALATION) at 21:48

## 2025-09-04 ASSESSMENT — PAIN DESCRIPTION - PAIN TYPE: TYPE: ACUTE PAIN

## 2025-09-04 ASSESSMENT — PAIN DESCRIPTION - LOCATION: LOCATION: HEAD

## 2025-09-04 ASSESSMENT — PAIN SCALES - GENERAL: PAINLEVEL_OUTOF10: 8

## 2025-09-04 ASSESSMENT — PAIN - FUNCTIONAL ASSESSMENT: PAIN_FUNCTIONAL_ASSESSMENT: 0-10

## 2025-09-04 ASSESSMENT — PAIN DESCRIPTION - PROGRESSION: CLINICAL_PROGRESSION: NOT CHANGED

## 2025-09-05 VITALS
HEIGHT: 61 IN | RESPIRATION RATE: 17 BRPM | HEART RATE: 96 BPM | OXYGEN SATURATION: 96 % | TEMPERATURE: 96.8 F | SYSTOLIC BLOOD PRESSURE: 126 MMHG | DIASTOLIC BLOOD PRESSURE: 74 MMHG | BODY MASS INDEX: 33.99 KG/M2 | WEIGHT: 180 LBS

## 2025-09-18 ENCOUNTER — APPOINTMENT (OUTPATIENT)
Dept: UROLOGY | Facility: CLINIC | Age: 65
End: 2025-09-18
Payer: MEDICARE

## 2025-09-18 ENCOUNTER — APPOINTMENT (OUTPATIENT)
Dept: OPHTHALMOLOGY | Age: 65
End: 2025-09-18
Payer: MEDICARE

## 2025-09-22 ENCOUNTER — APPOINTMENT (OUTPATIENT)
Facility: CLINIC | Age: 65
End: 2025-09-22
Payer: MEDICARE

## 2025-10-20 ENCOUNTER — APPOINTMENT (OUTPATIENT)
Dept: OPHTHALMOLOGY | Age: 65
End: 2025-10-20
Payer: MEDICARE

## 2025-12-04 ENCOUNTER — APPOINTMENT (OUTPATIENT)
Dept: CARDIOLOGY | Facility: CLINIC | Age: 65
End: 2025-12-04
Payer: MEDICARE

## 2025-12-05 ENCOUNTER — APPOINTMENT (OUTPATIENT)
Dept: PRIMARY CARE | Facility: CLINIC | Age: 65
End: 2025-12-05
Payer: COMMERCIAL

## 2026-01-12 ENCOUNTER — APPOINTMENT (OUTPATIENT)
Dept: CARDIOLOGY | Facility: CLINIC | Age: 66
End: 2026-01-12
Payer: MEDICARE

## 2026-08-03 ENCOUNTER — APPOINTMENT (OUTPATIENT)
Dept: OPHTHALMOLOGY | Age: 66
End: 2026-08-03
Payer: MEDICARE

## (undated) DEVICE — TR BAND, RADIAL COMPRESSION, STANDARD, 24CM

## (undated) DEVICE — GUIDE WIRE, 260CM, HI-TORQUE, VERSACORE, MODIFIED J

## (undated) DEVICE — DEVICE, INFLATION, PRESTO ID40ATM 30CC

## (undated) DEVICE — CATHETER, GUIDING, LAUNCHER, 6FR, JR 4.0

## (undated) DEVICE — Device

## (undated) DEVICE — GUIDEWIRE, RUN THROUGH WIRE, 180CM

## (undated) DEVICE — SHEATH, GLIDESHEATH, SLENDER, 6FR 10CM

## (undated) DEVICE — CATHETER, EAGLE EYE, PLATNIUM (IVUS)

## (undated) DEVICE — CATHETER, BALLOON, NCSPRINTER, RX, 3.5 X 12

## (undated) DEVICE — CATHETER, OPTITORQUE, 5FR, TIG, 1H/100CM